# Patient Record
Sex: FEMALE | Race: WHITE | URBAN - METROPOLITAN AREA
[De-identification: names, ages, dates, MRNs, and addresses within clinical notes are randomized per-mention and may not be internally consistent; named-entity substitution may affect disease eponyms.]

---

## 2017-06-02 ENCOUNTER — EMERGENCY (EMERGENCY)
Facility: HOSPITAL | Age: 72
LOS: 0 days | Discharge: NURSING, REHAB OR LONG-TERM CARE | End: 2017-06-02
Admitting: INTERNAL MEDICINE
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
## 2017-06-02 DIAGNOSIS — F41.9 ANXIETY DISORDER, UNSPECIFIED: ICD-10-CM

## 2017-06-02 DIAGNOSIS — F25.9 SCHIZOAFFECTIVE DISORDER, UNSPECIFIED: ICD-10-CM

## 2017-06-02 DIAGNOSIS — K21.9 GASTRO-ESOPHAGEAL REFLUX DISEASE WITHOUT ESOPHAGITIS: ICD-10-CM

## 2017-06-02 DIAGNOSIS — J44.9 CHRONIC OBSTRUCTIVE PULMONARY DISEASE, UNSPECIFIED: ICD-10-CM

## 2017-06-02 DIAGNOSIS — R56.9 UNSPECIFIED CONVULSIONS: ICD-10-CM

## 2017-06-02 DIAGNOSIS — R27.0 ATAXIA, UNSPECIFIED: ICD-10-CM

## 2017-06-02 DIAGNOSIS — G43.909 MIGRAINE, UNSPECIFIED, NOT INTRACTABLE, WITHOUT STATUS MIGRAINOSUS: ICD-10-CM

## 2017-06-02 DIAGNOSIS — E78.5 HYPERLIPIDEMIA, UNSPECIFIED: ICD-10-CM

## 2017-06-23 ENCOUNTER — EMERGENCY (EMERGENCY)
Facility: HOSPITAL | Age: 72
LOS: 0 days | Discharge: HOME | End: 2017-06-23
Admitting: INTERNAL MEDICINE

## 2017-06-23 DIAGNOSIS — E78.5 HYPERLIPIDEMIA, UNSPECIFIED: ICD-10-CM

## 2017-06-23 DIAGNOSIS — J44.9 CHRONIC OBSTRUCTIVE PULMONARY DISEASE, UNSPECIFIED: ICD-10-CM

## 2017-06-23 DIAGNOSIS — Z79.899 OTHER LONG TERM (CURRENT) DRUG THERAPY: ICD-10-CM

## 2017-06-23 DIAGNOSIS — K21.9 GASTRO-ESOPHAGEAL REFLUX DISEASE WITHOUT ESOPHAGITIS: ICD-10-CM

## 2017-06-23 DIAGNOSIS — F25.9 SCHIZOAFFECTIVE DISORDER, UNSPECIFIED: ICD-10-CM

## 2017-06-23 DIAGNOSIS — R27.0 ATAXIA, UNSPECIFIED: ICD-10-CM
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
## 2017-06-23 DIAGNOSIS — Z79.82 LONG TERM (CURRENT) USE OF ASPIRIN: ICD-10-CM

## 2017-06-23 DIAGNOSIS — Z91.013 ALLERGY TO SEAFOOD: ICD-10-CM

## 2017-06-23 DIAGNOSIS — Z87.891 PERSONAL HISTORY OF NICOTINE DEPENDENCE: ICD-10-CM

## 2017-06-23 DIAGNOSIS — G43.909 MIGRAINE, UNSPECIFIED, NOT INTRACTABLE, WITHOUT STATUS MIGRAINOSUS: ICD-10-CM

## 2017-06-23 DIAGNOSIS — F41.9 ANXIETY DISORDER, UNSPECIFIED: ICD-10-CM

## 2017-06-23 DIAGNOSIS — M54.5 LOW BACK PAIN: ICD-10-CM

## 2017-06-23 DIAGNOSIS — Z88.2 ALLERGY STATUS TO SULFONAMIDES: ICD-10-CM

## 2017-06-23 DIAGNOSIS — R56.9 UNSPECIFIED CONVULSIONS: ICD-10-CM

## 2017-06-23 DIAGNOSIS — Z88.0 ALLERGY STATUS TO PENICILLIN: ICD-10-CM

## 2017-06-23 DIAGNOSIS — G89.29 OTHER CHRONIC PAIN: ICD-10-CM

## 2017-06-26 ENCOUNTER — EMERGENCY (EMERGENCY)
Facility: HOSPITAL | Age: 72
LOS: 0 days | Discharge: NURSING, REHAB OR LONG-TERM CARE | End: 2017-06-27
Admitting: INTERNAL MEDICINE

## 2017-06-26 DIAGNOSIS — E03.9 HYPOTHYROIDISM, UNSPECIFIED: ICD-10-CM
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
## 2017-06-26 DIAGNOSIS — R27.0 ATAXIA, UNSPECIFIED: ICD-10-CM

## 2017-06-26 DIAGNOSIS — J44.9 CHRONIC OBSTRUCTIVE PULMONARY DISEASE, UNSPECIFIED: ICD-10-CM

## 2017-06-26 DIAGNOSIS — K21.9 GASTRO-ESOPHAGEAL REFLUX DISEASE WITHOUT ESOPHAGITIS: ICD-10-CM

## 2017-06-26 DIAGNOSIS — G43.909 MIGRAINE, UNSPECIFIED, NOT INTRACTABLE, WITHOUT STATUS MIGRAINOSUS: ICD-10-CM

## 2017-06-26 DIAGNOSIS — M54.9 DORSALGIA, UNSPECIFIED: ICD-10-CM

## 2017-06-26 DIAGNOSIS — E86.0 DEHYDRATION: ICD-10-CM

## 2017-06-26 DIAGNOSIS — E78.5 HYPERLIPIDEMIA, UNSPECIFIED: ICD-10-CM
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
## 2017-06-26 DIAGNOSIS — F32.9 MAJOR DEPRESSIVE DISORDER, SINGLE EPISODE, UNSPECIFIED: ICD-10-CM

## 2017-06-26 DIAGNOSIS — F25.9 SCHIZOAFFECTIVE DISORDER, UNSPECIFIED: ICD-10-CM

## 2017-06-26 DIAGNOSIS — F41.9 ANXIETY DISORDER, UNSPECIFIED: ICD-10-CM

## 2017-06-26 DIAGNOSIS — Z87.891 PERSONAL HISTORY OF NICOTINE DEPENDENCE: ICD-10-CM

## 2017-06-26 DIAGNOSIS — R56.9 UNSPECIFIED CONVULSIONS: ICD-10-CM
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
## 2017-06-26 DIAGNOSIS — R07.81 PLEURODYNIA: ICD-10-CM
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
## 2017-06-28 DIAGNOSIS — Z88.0 ALLERGY STATUS TO PENICILLIN: ICD-10-CM

## 2017-06-28 DIAGNOSIS — Z87.891 PERSONAL HISTORY OF NICOTINE DEPENDENCE: ICD-10-CM

## 2017-06-28 DIAGNOSIS — J44.9 CHRONIC OBSTRUCTIVE PULMONARY DISEASE, UNSPECIFIED: ICD-10-CM

## 2017-06-28 DIAGNOSIS — Z91.013 ALLERGY TO SEAFOOD: ICD-10-CM

## 2017-06-28 DIAGNOSIS — M62.81 MUSCLE WEAKNESS (GENERALIZED): ICD-10-CM

## 2017-06-28 DIAGNOSIS — Z98.890 OTHER SPECIFIED POSTPROCEDURAL STATES: ICD-10-CM

## 2017-06-28 DIAGNOSIS — Z79.899 OTHER LONG TERM (CURRENT) DRUG THERAPY: ICD-10-CM

## 2017-06-28 DIAGNOSIS — R51 HEADACHE: ICD-10-CM

## 2017-06-28 DIAGNOSIS — Z79.82 LONG TERM (CURRENT) USE OF ASPIRIN: ICD-10-CM

## 2017-06-28 DIAGNOSIS — Z88.8 ALLERGY STATUS TO OTHER DRUGS, MEDICAMENTS AND BIOLOGICAL SUBSTANCES STATUS: ICD-10-CM

## 2017-06-28 DIAGNOSIS — K21.9 GASTRO-ESOPHAGEAL REFLUX DISEASE WITHOUT ESOPHAGITIS: ICD-10-CM

## 2017-07-16 ENCOUNTER — EMERGENCY (EMERGENCY)
Facility: HOSPITAL | Age: 72
LOS: 0 days | Discharge: NURSING, REHAB OR LONG-TERM CARE | End: 2017-07-16
Admitting: INTERNAL MEDICINE

## 2017-07-16 DIAGNOSIS — F17.200 NICOTINE DEPENDENCE, UNSPECIFIED, UNCOMPLICATED: ICD-10-CM

## 2017-07-16 DIAGNOSIS — F31.9 BIPOLAR DISORDER, UNSPECIFIED: ICD-10-CM

## 2017-07-16 DIAGNOSIS — E78.5 HYPERLIPIDEMIA, UNSPECIFIED: ICD-10-CM

## 2017-07-16 DIAGNOSIS — R56.9 UNSPECIFIED CONVULSIONS: ICD-10-CM

## 2017-07-16 DIAGNOSIS — K21.9 GASTRO-ESOPHAGEAL REFLUX DISEASE WITHOUT ESOPHAGITIS: ICD-10-CM
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
## 2017-07-16 DIAGNOSIS — G43.909 MIGRAINE, UNSPECIFIED, NOT INTRACTABLE, WITHOUT STATUS MIGRAINOSUS: ICD-10-CM

## 2017-07-16 DIAGNOSIS — S93.402A SPRAIN OF UNSPECIFIED LIGAMENT OF LEFT ANKLE, INITIAL ENCOUNTER: ICD-10-CM
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
## 2017-07-16 DIAGNOSIS — F25.9 SCHIZOAFFECTIVE DISORDER, UNSPECIFIED: ICD-10-CM

## 2017-07-16 DIAGNOSIS — J44.9 CHRONIC OBSTRUCTIVE PULMONARY DISEASE, UNSPECIFIED: ICD-10-CM
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
## 2017-07-16 DIAGNOSIS — F41.9 ANXIETY DISORDER, UNSPECIFIED: ICD-10-CM

## 2017-07-16 DIAGNOSIS — R27.0 ATAXIA, UNSPECIFIED: ICD-10-CM

## 2017-07-16 DIAGNOSIS — E03.9 HYPOTHYROIDISM, UNSPECIFIED: ICD-10-CM

## 2017-07-16 DIAGNOSIS — M25.572 PAIN IN LEFT ANKLE AND JOINTS OF LEFT FOOT: ICD-10-CM

## 2017-07-27 ENCOUNTER — EMERGENCY (EMERGENCY)
Facility: HOSPITAL | Age: 72
LOS: 0 days | Discharge: HOME | End: 2017-07-27
Admitting: INTERNAL MEDICINE

## 2017-07-27 DIAGNOSIS — R56.9 UNSPECIFIED CONVULSIONS: ICD-10-CM

## 2017-07-27 DIAGNOSIS — S50.02XA CONTUSION OF LEFT ELBOW, INITIAL ENCOUNTER: ICD-10-CM
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
## 2017-07-27 DIAGNOSIS — E78.5 HYPERLIPIDEMIA, UNSPECIFIED: ICD-10-CM

## 2017-07-27 DIAGNOSIS — Z79.82 LONG TERM (CURRENT) USE OF ASPIRIN: ICD-10-CM

## 2017-07-27 DIAGNOSIS — J44.9 CHRONIC OBSTRUCTIVE PULMONARY DISEASE, UNSPECIFIED: ICD-10-CM

## 2017-07-27 DIAGNOSIS — K21.9 GASTRO-ESOPHAGEAL REFLUX DISEASE WITHOUT ESOPHAGITIS: ICD-10-CM

## 2017-07-27 DIAGNOSIS — M25.522 PAIN IN LEFT ELBOW: ICD-10-CM

## 2017-07-27 DIAGNOSIS — R27.0 ATAXIA, UNSPECIFIED: ICD-10-CM

## 2017-07-27 DIAGNOSIS — Z87.891 PERSONAL HISTORY OF NICOTINE DEPENDENCE: ICD-10-CM
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
## 2017-07-27 DIAGNOSIS — G43.909 MIGRAINE, UNSPECIFIED, NOT INTRACTABLE, WITHOUT STATUS MIGRAINOSUS: ICD-10-CM

## 2017-07-27 DIAGNOSIS — F25.9 SCHIZOAFFECTIVE DISORDER, UNSPECIFIED: ICD-10-CM

## 2017-07-27 DIAGNOSIS — E03.9 HYPOTHYROIDISM, UNSPECIFIED: ICD-10-CM

## 2017-07-27 DIAGNOSIS — F41.9 ANXIETY DISORDER, UNSPECIFIED: ICD-10-CM
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
## 2017-07-27 DIAGNOSIS — S09.90XA UNSPECIFIED INJURY OF HEAD, INITIAL ENCOUNTER: ICD-10-CM

## 2017-07-27 DIAGNOSIS — Z88.0 ALLERGY STATUS TO PENICILLIN: ICD-10-CM

## 2017-08-04 ENCOUNTER — EMERGENCY (EMERGENCY)
Facility: HOSPITAL | Age: 72
LOS: 0 days | Discharge: NURSING, REHAB OR LONG-TERM CARE | End: 2017-08-05
Admitting: INTERNAL MEDICINE

## 2017-08-04 DIAGNOSIS — E78.5 HYPERLIPIDEMIA, UNSPECIFIED: ICD-10-CM

## 2017-08-04 DIAGNOSIS — K21.9 GASTRO-ESOPHAGEAL REFLUX DISEASE WITHOUT ESOPHAGITIS: ICD-10-CM

## 2017-08-04 DIAGNOSIS — F31.9 BIPOLAR DISORDER, UNSPECIFIED: ICD-10-CM
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
## 2017-08-04 DIAGNOSIS — F41.9 ANXIETY DISORDER, UNSPECIFIED: ICD-10-CM

## 2017-08-04 DIAGNOSIS — G43.909 MIGRAINE, UNSPECIFIED, NOT INTRACTABLE, WITHOUT STATUS MIGRAINOSUS: ICD-10-CM

## 2017-08-04 DIAGNOSIS — R56.9 UNSPECIFIED CONVULSIONS: ICD-10-CM

## 2017-08-04 DIAGNOSIS — E03.9 HYPOTHYROIDISM, UNSPECIFIED: ICD-10-CM

## 2017-08-04 DIAGNOSIS — J44.9 CHRONIC OBSTRUCTIVE PULMONARY DISEASE, UNSPECIFIED: ICD-10-CM

## 2017-08-04 DIAGNOSIS — F25.9 SCHIZOAFFECTIVE DISORDER, UNSPECIFIED: ICD-10-CM
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
## 2017-08-04 DIAGNOSIS — M25.521 PAIN IN RIGHT ELBOW: ICD-10-CM

## 2017-08-04 DIAGNOSIS — R27.0 ATAXIA, UNSPECIFIED: ICD-10-CM
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
## 2017-08-04 DIAGNOSIS — R41.0 DISORIENTATION, UNSPECIFIED: ICD-10-CM

## 2017-08-15 ENCOUNTER — EMERGENCY (EMERGENCY)
Facility: HOSPITAL | Age: 72
LOS: 1 days | Discharge: NURSING, REHAB OR LONG-TERM CARE | End: 2017-08-15
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
## 2017-08-15 DIAGNOSIS — K21.9 GASTRO-ESOPHAGEAL REFLUX DISEASE WITHOUT ESOPHAGITIS: ICD-10-CM

## 2017-08-15 DIAGNOSIS — J44.9 CHRONIC OBSTRUCTIVE PULMONARY DISEASE, UNSPECIFIED: ICD-10-CM

## 2017-08-15 DIAGNOSIS — M25.50 PAIN IN UNSPECIFIED JOINT: ICD-10-CM

## 2017-08-15 DIAGNOSIS — Z88.0 ALLERGY STATUS TO PENICILLIN: ICD-10-CM

## 2017-08-15 DIAGNOSIS — F25.9 SCHIZOAFFECTIVE DISORDER, UNSPECIFIED: ICD-10-CM

## 2017-08-15 DIAGNOSIS — Z79.82 LONG TERM (CURRENT) USE OF ASPIRIN: ICD-10-CM

## 2017-08-15 DIAGNOSIS — Z79.899 OTHER LONG TERM (CURRENT) DRUG THERAPY: ICD-10-CM

## 2017-08-15 DIAGNOSIS — G43.909 MIGRAINE, UNSPECIFIED, NOT INTRACTABLE, WITHOUT STATUS MIGRAINOSUS: ICD-10-CM

## 2017-08-15 DIAGNOSIS — E78.5 HYPERLIPIDEMIA, UNSPECIFIED: ICD-10-CM

## 2017-08-15 DIAGNOSIS — F41.9 ANXIETY DISORDER, UNSPECIFIED: ICD-10-CM

## 2017-08-15 DIAGNOSIS — I63.9 CEREBRAL INFARCTION, UNSPECIFIED: ICD-10-CM

## 2017-08-15 DIAGNOSIS — M79.1 MYALGIA: ICD-10-CM

## 2017-08-15 DIAGNOSIS — G89.29 OTHER CHRONIC PAIN: ICD-10-CM

## 2017-08-15 DIAGNOSIS — E03.9 HYPOTHYROIDISM, UNSPECIFIED: ICD-10-CM

## 2017-08-15 DIAGNOSIS — R56.9 UNSPECIFIED CONVULSIONS: ICD-10-CM

## 2017-08-15 DIAGNOSIS — Z91.013 ALLERGY TO SEAFOOD: ICD-10-CM

## 2017-08-15 DIAGNOSIS — Z86.73 PERSONAL HISTORY OF TRANSIENT ISCHEMIC ATTACK (TIA), AND CEREBRAL INFARCTION WITHOUT RESIDUAL DEFICITS: ICD-10-CM

## 2017-08-15 DIAGNOSIS — Z88.8 ALLERGY STATUS TO OTHER DRUGS, MEDICAMENTS AND BIOLOGICAL SUBSTANCES STATUS: ICD-10-CM

## 2017-08-15 DIAGNOSIS — R27.0 ATAXIA, UNSPECIFIED: ICD-10-CM

## 2017-08-19 ENCOUNTER — EMERGENCY (EMERGENCY)
Facility: HOSPITAL | Age: 72
LOS: 0 days | Discharge: HOME | End: 2017-08-20
Admitting: INTERNAL MEDICINE

## 2017-08-19 DIAGNOSIS — R56.9 UNSPECIFIED CONVULSIONS: ICD-10-CM

## 2017-08-19 DIAGNOSIS — Z98.890 OTHER SPECIFIED POSTPROCEDURAL STATES: ICD-10-CM

## 2017-08-19 DIAGNOSIS — Z79.82 LONG TERM (CURRENT) USE OF ASPIRIN: ICD-10-CM

## 2017-08-19 DIAGNOSIS — Z91.013 ALLERGY TO SEAFOOD: ICD-10-CM

## 2017-08-19 DIAGNOSIS — R42 DIZZINESS AND GIDDINESS: ICD-10-CM

## 2017-08-19 DIAGNOSIS — E78.5 HYPERLIPIDEMIA, UNSPECIFIED: ICD-10-CM

## 2017-08-19 DIAGNOSIS — K21.9 GASTRO-ESOPHAGEAL REFLUX DISEASE WITHOUT ESOPHAGITIS: ICD-10-CM

## 2017-08-19 DIAGNOSIS — J44.9 CHRONIC OBSTRUCTIVE PULMONARY DISEASE, UNSPECIFIED: ICD-10-CM

## 2017-08-19 DIAGNOSIS — F41.9 ANXIETY DISORDER, UNSPECIFIED: ICD-10-CM

## 2017-08-19 DIAGNOSIS — Z79.899 OTHER LONG TERM (CURRENT) DRUG THERAPY: ICD-10-CM
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
## 2017-08-19 DIAGNOSIS — R27.0 ATAXIA, UNSPECIFIED: ICD-10-CM

## 2017-08-19 DIAGNOSIS — Z88.8 ALLERGY STATUS TO OTHER DRUGS, MEDICAMENTS AND BIOLOGICAL SUBSTANCES STATUS: ICD-10-CM

## 2017-08-19 DIAGNOSIS — E03.9 HYPOTHYROIDISM, UNSPECIFIED: ICD-10-CM

## 2017-08-19 DIAGNOSIS — F25.9 SCHIZOAFFECTIVE DISORDER, UNSPECIFIED: ICD-10-CM

## 2017-08-19 DIAGNOSIS — G43.909 MIGRAINE, UNSPECIFIED, NOT INTRACTABLE, WITHOUT STATUS MIGRAINOSUS: ICD-10-CM

## 2017-08-19 DIAGNOSIS — Z87.891 PERSONAL HISTORY OF NICOTINE DEPENDENCE: ICD-10-CM

## 2017-08-19 DIAGNOSIS — Z88.0 ALLERGY STATUS TO PENICILLIN: ICD-10-CM

## 2017-08-23 ENCOUNTER — EMERGENCY (EMERGENCY)
Facility: HOSPITAL | Age: 72
LOS: 0 days | Discharge: NURSING, REHAB OR LONG-TERM CARE | End: 2017-08-23
Admitting: INTERNAL MEDICINE

## 2017-08-23 DIAGNOSIS — Z88.0 ALLERGY STATUS TO PENICILLIN: ICD-10-CM

## 2017-08-23 DIAGNOSIS — E78.5 HYPERLIPIDEMIA, UNSPECIFIED: ICD-10-CM

## 2017-08-23 DIAGNOSIS — F25.9 SCHIZOAFFECTIVE DISORDER, UNSPECIFIED: ICD-10-CM

## 2017-08-23 DIAGNOSIS — F17.200 NICOTINE DEPENDENCE, UNSPECIFIED, UNCOMPLICATED: ICD-10-CM

## 2017-08-23 DIAGNOSIS — F32.9 MAJOR DEPRESSIVE DISORDER, SINGLE EPISODE, UNSPECIFIED: ICD-10-CM

## 2017-08-23 DIAGNOSIS — R27.0 ATAXIA, UNSPECIFIED: ICD-10-CM

## 2017-08-23 DIAGNOSIS — F41.9 ANXIETY DISORDER, UNSPECIFIED: ICD-10-CM

## 2017-08-23 DIAGNOSIS — Z79.899 OTHER LONG TERM (CURRENT) DRUG THERAPY: ICD-10-CM

## 2017-08-23 DIAGNOSIS — J44.9 CHRONIC OBSTRUCTIVE PULMONARY DISEASE, UNSPECIFIED: ICD-10-CM

## 2017-08-23 DIAGNOSIS — F34.1 DYSTHYMIC DISORDER: ICD-10-CM

## 2017-08-23 DIAGNOSIS — Z91.013 ALLERGY TO SEAFOOD: ICD-10-CM

## 2017-08-23 DIAGNOSIS — G43.909 MIGRAINE, UNSPECIFIED, NOT INTRACTABLE, WITHOUT STATUS MIGRAINOSUS: ICD-10-CM

## 2017-08-23 DIAGNOSIS — K21.9 GASTRO-ESOPHAGEAL REFLUX DISEASE WITHOUT ESOPHAGITIS: ICD-10-CM

## 2017-08-23 DIAGNOSIS — Z88.8 ALLERGY STATUS TO OTHER DRUGS, MEDICAMENTS AND BIOLOGICAL SUBSTANCES: ICD-10-CM
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
## 2017-08-23 DIAGNOSIS — E03.9 HYPOTHYROIDISM, UNSPECIFIED: ICD-10-CM

## 2017-08-23 DIAGNOSIS — R56.9 UNSPECIFIED CONVULSIONS: ICD-10-CM

## 2017-08-23 DIAGNOSIS — Z79.82 LONG TERM (CURRENT) USE OF ASPIRIN: ICD-10-CM

## 2017-08-26 ENCOUNTER — EMERGENCY (EMERGENCY)
Facility: HOSPITAL | Age: 72
LOS: 0 days | Discharge: NURSING, REHAB OR LONG-TERM CARE | End: 2017-08-27
Admitting: INTERNAL MEDICINE

## 2017-08-26 DIAGNOSIS — F17.200 NICOTINE DEPENDENCE, UNSPECIFIED, UNCOMPLICATED: ICD-10-CM

## 2017-08-26 DIAGNOSIS — K21.9 GASTRO-ESOPHAGEAL REFLUX DISEASE WITHOUT ESOPHAGITIS: ICD-10-CM

## 2017-08-26 DIAGNOSIS — R27.0 ATAXIA, UNSPECIFIED: ICD-10-CM

## 2017-08-26 DIAGNOSIS — J44.9 CHRONIC OBSTRUCTIVE PULMONARY DISEASE, UNSPECIFIED: ICD-10-CM

## 2017-08-26 DIAGNOSIS — F31.9 BIPOLAR DISORDER, UNSPECIFIED: ICD-10-CM

## 2017-08-26 DIAGNOSIS — E03.9 HYPOTHYROIDISM, UNSPECIFIED: ICD-10-CM

## 2017-08-26 DIAGNOSIS — M79.605 PAIN IN LEFT LEG: ICD-10-CM

## 2017-08-26 DIAGNOSIS — Z79.899 OTHER LONG TERM (CURRENT) DRUG THERAPY: ICD-10-CM

## 2017-08-26 DIAGNOSIS — M79.604 PAIN IN RIGHT LEG: ICD-10-CM

## 2017-08-26 DIAGNOSIS — M79.602 PAIN IN LEFT ARM: ICD-10-CM

## 2017-08-26 DIAGNOSIS — F41.9 ANXIETY DISORDER, UNSPECIFIED: ICD-10-CM

## 2017-08-26 DIAGNOSIS — F25.9 SCHIZOAFFECTIVE DISORDER, UNSPECIFIED: ICD-10-CM

## 2017-08-26 DIAGNOSIS — E78.5 HYPERLIPIDEMIA, UNSPECIFIED: ICD-10-CM

## 2017-08-26 DIAGNOSIS — G89.29 OTHER CHRONIC PAIN: ICD-10-CM
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
## 2017-08-26 DIAGNOSIS — M79.601 PAIN IN RIGHT ARM: ICD-10-CM

## 2017-08-26 DIAGNOSIS — R56.9 UNSPECIFIED CONVULSIONS: ICD-10-CM

## 2017-08-26 DIAGNOSIS — G43.909 MIGRAINE, UNSPECIFIED, NOT INTRACTABLE, WITHOUT STATUS MIGRAINOSUS: ICD-10-CM

## 2017-09-04 ENCOUNTER — EMERGENCY (EMERGENCY)
Facility: HOSPITAL | Age: 72
LOS: 0 days | Discharge: NURSING, REHAB OR LONG-TERM CARE | End: 2017-09-04
Admitting: INTERNAL MEDICINE
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
## 2017-09-04 DIAGNOSIS — K21.9 GASTRO-ESOPHAGEAL REFLUX DISEASE WITHOUT ESOPHAGITIS: ICD-10-CM

## 2017-09-04 DIAGNOSIS — M79.605 PAIN IN LEFT LEG: ICD-10-CM

## 2017-09-04 DIAGNOSIS — G89.29 OTHER CHRONIC PAIN: ICD-10-CM

## 2017-09-04 DIAGNOSIS — Z88.0 ALLERGY STATUS TO PENICILLIN: ICD-10-CM

## 2017-09-04 DIAGNOSIS — E78.5 HYPERLIPIDEMIA, UNSPECIFIED: ICD-10-CM

## 2017-09-04 DIAGNOSIS — R56.9 UNSPECIFIED CONVULSIONS: ICD-10-CM

## 2017-09-04 DIAGNOSIS — M25.551 PAIN IN RIGHT HIP: ICD-10-CM

## 2017-09-04 DIAGNOSIS — Z91.013 ALLERGY TO SEAFOOD: ICD-10-CM

## 2017-09-04 DIAGNOSIS — Z79.82 LONG TERM (CURRENT) USE OF ASPIRIN: ICD-10-CM

## 2017-09-04 DIAGNOSIS — G43.909 MIGRAINE, UNSPECIFIED, NOT INTRACTABLE, WITHOUT STATUS MIGRAINOSUS: ICD-10-CM

## 2017-09-04 DIAGNOSIS — F41.9 ANXIETY DISORDER, UNSPECIFIED: ICD-10-CM

## 2017-09-04 DIAGNOSIS — J44.9 CHRONIC OBSTRUCTIVE PULMONARY DISEASE, UNSPECIFIED: ICD-10-CM

## 2017-09-04 DIAGNOSIS — F25.9 SCHIZOAFFECTIVE DISORDER, UNSPECIFIED: ICD-10-CM

## 2017-09-04 DIAGNOSIS — R27.0 ATAXIA, UNSPECIFIED: ICD-10-CM

## 2017-09-04 DIAGNOSIS — E03.9 HYPOTHYROIDISM, UNSPECIFIED: ICD-10-CM

## 2017-09-04 DIAGNOSIS — Z88.8 ALLERGY STATUS TO OTHER DRUGS, MEDICAMENTS AND BIOLOGICAL SUBSTANCES: ICD-10-CM

## 2017-09-04 DIAGNOSIS — M79.602 PAIN IN LEFT ARM: ICD-10-CM

## 2017-09-17 ENCOUNTER — EMERGENCY (EMERGENCY)
Facility: HOSPITAL | Age: 72
LOS: 0 days | Discharge: HOME | End: 2017-09-17
Admitting: INTERNAL MEDICINE

## 2017-09-17 DIAGNOSIS — E03.9 HYPOTHYROIDISM, UNSPECIFIED: ICD-10-CM

## 2017-09-17 DIAGNOSIS — K21.9 GASTRO-ESOPHAGEAL REFLUX DISEASE WITHOUT ESOPHAGITIS: ICD-10-CM

## 2017-09-17 DIAGNOSIS — Z88.0 ALLERGY STATUS TO PENICILLIN: ICD-10-CM

## 2017-09-17 DIAGNOSIS — J44.9 CHRONIC OBSTRUCTIVE PULMONARY DISEASE, UNSPECIFIED: ICD-10-CM

## 2017-09-17 DIAGNOSIS — R56.9 UNSPECIFIED CONVULSIONS: ICD-10-CM

## 2017-09-17 DIAGNOSIS — R27.0 ATAXIA, UNSPECIFIED: ICD-10-CM

## 2017-09-17 DIAGNOSIS — F31.9 BIPOLAR DISORDER, UNSPECIFIED: ICD-10-CM

## 2017-09-17 DIAGNOSIS — M79.89 OTHER SPECIFIED SOFT TISSUE DISORDERS: ICD-10-CM

## 2017-09-17 DIAGNOSIS — Z79.82 LONG TERM (CURRENT) USE OF ASPIRIN: ICD-10-CM

## 2017-09-17 DIAGNOSIS — Z98.890 OTHER SPECIFIED POSTPROCEDURAL STATES: ICD-10-CM

## 2017-09-17 DIAGNOSIS — F17.200 NICOTINE DEPENDENCE, UNSPECIFIED, UNCOMPLICATED: ICD-10-CM

## 2017-09-17 DIAGNOSIS — F25.9 SCHIZOAFFECTIVE DISORDER, UNSPECIFIED: ICD-10-CM

## 2017-09-17 DIAGNOSIS — F41.9 ANXIETY DISORDER, UNSPECIFIED: ICD-10-CM
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
## 2017-09-17 DIAGNOSIS — M79.601 PAIN IN RIGHT ARM: ICD-10-CM

## 2017-09-17 DIAGNOSIS — Z91.013 ALLERGY TO SEAFOOD: ICD-10-CM

## 2017-09-17 DIAGNOSIS — E78.5 HYPERLIPIDEMIA, UNSPECIFIED: ICD-10-CM

## 2017-09-17 DIAGNOSIS — I10 ESSENTIAL (PRIMARY) HYPERTENSION: ICD-10-CM

## 2017-09-17 DIAGNOSIS — G43.909 MIGRAINE, UNSPECIFIED, NOT INTRACTABLE, WITHOUT STATUS MIGRAINOSUS: ICD-10-CM

## 2017-09-19 PROBLEM — Z00.00 ENCOUNTER FOR PREVENTIVE HEALTH EXAMINATION: Status: ACTIVE | Noted: 2017-09-19

## 2017-09-27 ENCOUNTER — EMERGENCY (EMERGENCY)
Facility: HOSPITAL | Age: 72
LOS: 0 days | Discharge: HOME | End: 2017-09-27

## 2017-09-27 DIAGNOSIS — R56.9 UNSPECIFIED CONVULSIONS: ICD-10-CM

## 2017-09-27 DIAGNOSIS — Z88.8 ALLERGY STATUS TO OTHER DRUGS, MEDICAMENTS AND BIOLOGICAL SUBSTANCES STATUS: ICD-10-CM

## 2017-09-27 DIAGNOSIS — G43.909 MIGRAINE, UNSPECIFIED, NOT INTRACTABLE, WITHOUT STATUS MIGRAINOSUS: ICD-10-CM

## 2017-09-27 DIAGNOSIS — E78.5 HYPERLIPIDEMIA, UNSPECIFIED: ICD-10-CM

## 2017-09-27 DIAGNOSIS — Z79.82 LONG TERM (CURRENT) USE OF ASPIRIN: ICD-10-CM
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
## 2017-09-27 DIAGNOSIS — Z79.899 OTHER LONG TERM (CURRENT) DRUG THERAPY: ICD-10-CM

## 2017-09-27 DIAGNOSIS — K21.9 GASTRO-ESOPHAGEAL REFLUX DISEASE WITHOUT ESOPHAGITIS: ICD-10-CM

## 2017-09-27 DIAGNOSIS — R27.0 ATAXIA, UNSPECIFIED: ICD-10-CM

## 2017-09-27 DIAGNOSIS — E03.9 HYPOTHYROIDISM, UNSPECIFIED: ICD-10-CM

## 2017-09-27 DIAGNOSIS — F25.9 SCHIZOAFFECTIVE DISORDER, UNSPECIFIED: ICD-10-CM

## 2017-09-27 DIAGNOSIS — F41.9 ANXIETY DISORDER, UNSPECIFIED: ICD-10-CM

## 2017-09-27 DIAGNOSIS — Z91.013 ALLERGY TO SEAFOOD: ICD-10-CM

## 2017-09-27 DIAGNOSIS — F17.200 NICOTINE DEPENDENCE, UNSPECIFIED, UNCOMPLICATED: ICD-10-CM

## 2017-09-27 DIAGNOSIS — J44.9 CHRONIC OBSTRUCTIVE PULMONARY DISEASE, UNSPECIFIED: ICD-10-CM

## 2017-09-27 DIAGNOSIS — Z88.0 ALLERGY STATUS TO PENICILLIN: ICD-10-CM

## 2017-10-16 ENCOUNTER — EMERGENCY (EMERGENCY)
Facility: HOSPITAL | Age: 72
LOS: 0 days | Discharge: HOME | End: 2017-10-17
Admitting: INTERNAL MEDICINE

## 2017-10-16 DIAGNOSIS — F25.9 SCHIZOAFFECTIVE DISORDER, UNSPECIFIED: ICD-10-CM

## 2017-10-16 DIAGNOSIS — R27.0 ATAXIA, UNSPECIFIED: ICD-10-CM

## 2017-10-16 DIAGNOSIS — Z79.899 OTHER LONG TERM (CURRENT) DRUG THERAPY: ICD-10-CM

## 2017-10-16 DIAGNOSIS — R56.9 UNSPECIFIED CONVULSIONS: ICD-10-CM

## 2017-10-16 DIAGNOSIS — E78.5 HYPERLIPIDEMIA, UNSPECIFIED: ICD-10-CM

## 2017-10-16 DIAGNOSIS — J44.9 CHRONIC OBSTRUCTIVE PULMONARY DISEASE, UNSPECIFIED: ICD-10-CM

## 2017-10-16 DIAGNOSIS — F41.9 ANXIETY DISORDER, UNSPECIFIED: ICD-10-CM

## 2017-10-16 DIAGNOSIS — R23.8 OTHER SKIN CHANGES: ICD-10-CM

## 2017-10-16 DIAGNOSIS — Z88.0 ALLERGY STATUS TO PENICILLIN: ICD-10-CM

## 2017-10-16 DIAGNOSIS — Z91.013 ALLERGY TO SEAFOOD: ICD-10-CM

## 2017-10-16 DIAGNOSIS — Z88.8 ALLERGY STATUS TO OTHER DRUGS, MEDICAMENTS AND BIOLOGICAL SUBSTANCES: ICD-10-CM
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
## 2017-10-16 DIAGNOSIS — R42 DIZZINESS AND GIDDINESS: ICD-10-CM

## 2017-10-16 DIAGNOSIS — G43.909 MIGRAINE, UNSPECIFIED, NOT INTRACTABLE, WITHOUT STATUS MIGRAINOSUS: ICD-10-CM

## 2017-10-16 DIAGNOSIS — K21.9 GASTRO-ESOPHAGEAL REFLUX DISEASE WITHOUT ESOPHAGITIS: ICD-10-CM

## 2017-10-16 DIAGNOSIS — Z79.82 LONG TERM (CURRENT) USE OF ASPIRIN: ICD-10-CM

## 2017-10-16 DIAGNOSIS — Z98.890 OTHER SPECIFIED POSTPROCEDURAL STATES: ICD-10-CM

## 2017-11-01 ENCOUNTER — EMERGENCY (EMERGENCY)
Facility: HOSPITAL | Age: 72
LOS: 0 days | Discharge: NURSING, REHAB OR LONG-TERM CARE | End: 2017-11-01
Admitting: INTERNAL MEDICINE

## 2017-11-01 DIAGNOSIS — E78.5 HYPERLIPIDEMIA, UNSPECIFIED: ICD-10-CM

## 2017-11-01 DIAGNOSIS — R07.89 OTHER CHEST PAIN: ICD-10-CM

## 2017-11-01 DIAGNOSIS — F25.9 SCHIZOAFFECTIVE DISORDER, UNSPECIFIED: ICD-10-CM

## 2017-11-01 DIAGNOSIS — J44.9 CHRONIC OBSTRUCTIVE PULMONARY DISEASE, UNSPECIFIED: ICD-10-CM

## 2017-11-01 DIAGNOSIS — F41.9 ANXIETY DISORDER, UNSPECIFIED: ICD-10-CM

## 2017-11-01 DIAGNOSIS — E03.9 HYPOTHYROIDISM, UNSPECIFIED: ICD-10-CM

## 2017-11-01 DIAGNOSIS — K21.9 GASTRO-ESOPHAGEAL REFLUX DISEASE WITHOUT ESOPHAGITIS: ICD-10-CM

## 2017-11-01 DIAGNOSIS — R27.0 ATAXIA, UNSPECIFIED: ICD-10-CM

## 2017-11-01 DIAGNOSIS — F44.5 CONVERSION DISORDER WITH SEIZURES OR CONVULSIONS: ICD-10-CM
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
## 2017-11-01 DIAGNOSIS — G43.909 MIGRAINE, UNSPECIFIED, NOT INTRACTABLE, WITHOUT STATUS MIGRAINOSUS: ICD-10-CM

## 2017-11-01 DIAGNOSIS — R56.9 UNSPECIFIED CONVULSIONS: ICD-10-CM

## 2017-11-15 ENCOUNTER — EMERGENCY (EMERGENCY)
Facility: HOSPITAL | Age: 72
LOS: 0 days | Discharge: NURSING, REHAB OR LONG-TERM CARE | End: 2017-11-15

## 2017-11-15 DIAGNOSIS — E78.5 HYPERLIPIDEMIA, UNSPECIFIED: ICD-10-CM

## 2017-11-15 DIAGNOSIS — R56.9 UNSPECIFIED CONVULSIONS: ICD-10-CM

## 2017-11-15 DIAGNOSIS — R29.818 OTHER SYMPTOMS AND SIGNS INVOLVING THE NERVOUS SYSTEM: ICD-10-CM

## 2017-11-15 DIAGNOSIS — Z88.8 ALLERGY STATUS TO OTHER DRUGS, MEDICAMENTS AND BIOLOGICAL SUBSTANCES STATUS: ICD-10-CM

## 2017-11-15 DIAGNOSIS — E03.9 HYPOTHYROIDISM, UNSPECIFIED: ICD-10-CM

## 2017-11-15 DIAGNOSIS — K21.9 GASTRO-ESOPHAGEAL REFLUX DISEASE WITHOUT ESOPHAGITIS: ICD-10-CM

## 2017-11-15 DIAGNOSIS — G43.909 MIGRAINE, UNSPECIFIED, NOT INTRACTABLE, WITHOUT STATUS MIGRAINOSUS: ICD-10-CM

## 2017-11-15 DIAGNOSIS — Z98.890 OTHER SPECIFIED POSTPROCEDURAL STATES: ICD-10-CM

## 2017-11-15 DIAGNOSIS — J44.9 CHRONIC OBSTRUCTIVE PULMONARY DISEASE, UNSPECIFIED: ICD-10-CM

## 2017-11-15 DIAGNOSIS — Z91.013 ALLERGY TO SEAFOOD: ICD-10-CM

## 2017-11-15 DIAGNOSIS — Z79.82 LONG TERM (CURRENT) USE OF ASPIRIN: ICD-10-CM

## 2017-11-15 DIAGNOSIS — F25.9 SCHIZOAFFECTIVE DISORDER, UNSPECIFIED: ICD-10-CM

## 2017-11-15 DIAGNOSIS — F41.9 ANXIETY DISORDER, UNSPECIFIED: ICD-10-CM

## 2017-11-15 DIAGNOSIS — F17.210 NICOTINE DEPENDENCE, CIGARETTES, UNCOMPLICATED: ICD-10-CM

## 2017-11-15 DIAGNOSIS — R27.0 ATAXIA, UNSPECIFIED: ICD-10-CM

## 2017-11-15 DIAGNOSIS — Z79.899 OTHER LONG TERM (CURRENT) DRUG THERAPY: ICD-10-CM

## 2017-11-15 DIAGNOSIS — Z88.0 ALLERGY STATUS TO PENICILLIN: ICD-10-CM
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
## 2017-12-01 ENCOUNTER — INPATIENT (INPATIENT)
Facility: HOSPITAL | Age: 72
LOS: 2 days | Discharge: NURSING, REHAB OR LONG-TERM CARE | End: 2017-12-04
Attending: INTERNAL MEDICINE | Admitting: INTERNAL MEDICINE
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
## 2017-12-01 DIAGNOSIS — K21.9 GASTRO-ESOPHAGEAL REFLUX DISEASE WITHOUT ESOPHAGITIS: ICD-10-CM

## 2017-12-01 DIAGNOSIS — J44.9 CHRONIC OBSTRUCTIVE PULMONARY DISEASE, UNSPECIFIED: ICD-10-CM

## 2017-12-01 DIAGNOSIS — E78.5 HYPERLIPIDEMIA, UNSPECIFIED: ICD-10-CM

## 2017-12-01 DIAGNOSIS — R27.0 ATAXIA, UNSPECIFIED: ICD-10-CM

## 2017-12-01 DIAGNOSIS — G43.909 MIGRAINE, UNSPECIFIED, NOT INTRACTABLE, WITHOUT STATUS MIGRAINOSUS: ICD-10-CM

## 2017-12-01 DIAGNOSIS — F41.9 ANXIETY DISORDER, UNSPECIFIED: ICD-10-CM

## 2017-12-01 DIAGNOSIS — R56.9 UNSPECIFIED CONVULSIONS: ICD-10-CM

## 2017-12-01 DIAGNOSIS — F25.9 SCHIZOAFFECTIVE DISORDER, UNSPECIFIED: ICD-10-CM

## 2017-12-07 DIAGNOSIS — G40.89 OTHER SEIZURES: ICD-10-CM

## 2017-12-07 DIAGNOSIS — K21.9 GASTRO-ESOPHAGEAL REFLUX DISEASE WITHOUT ESOPHAGITIS: ICD-10-CM

## 2017-12-07 DIAGNOSIS — E78.5 HYPERLIPIDEMIA, UNSPECIFIED: ICD-10-CM

## 2017-12-07 DIAGNOSIS — F31.9 BIPOLAR DISORDER, UNSPECIFIED: ICD-10-CM

## 2017-12-07 DIAGNOSIS — D64.9 ANEMIA, UNSPECIFIED: ICD-10-CM

## 2017-12-07 DIAGNOSIS — J44.9 CHRONIC OBSTRUCTIVE PULMONARY DISEASE, UNSPECIFIED: ICD-10-CM

## 2017-12-07 DIAGNOSIS — G43.909 MIGRAINE, UNSPECIFIED, NOT INTRACTABLE, WITHOUT STATUS MIGRAINOSUS: ICD-10-CM

## 2017-12-07 DIAGNOSIS — F41.9 ANXIETY DISORDER, UNSPECIFIED: ICD-10-CM

## 2017-12-07 DIAGNOSIS — E03.9 HYPOTHYROIDISM, UNSPECIFIED: ICD-10-CM

## 2017-12-07 DIAGNOSIS — Z86.73 PERSONAL HISTORY OF TRANSIENT ISCHEMIC ATTACK (TIA), AND CEREBRAL INFARCTION WITHOUT RESIDUAL DEFICITS: ICD-10-CM

## 2017-12-08 ENCOUNTER — EMERGENCY (EMERGENCY)
Facility: HOSPITAL | Age: 72
LOS: 0 days | Discharge: HOME | End: 2017-12-08
Admitting: INTERNAL MEDICINE

## 2017-12-08 DIAGNOSIS — R27.0 ATAXIA, UNSPECIFIED: ICD-10-CM

## 2017-12-08 DIAGNOSIS — E03.9 HYPOTHYROIDISM, UNSPECIFIED: ICD-10-CM

## 2017-12-08 DIAGNOSIS — K21.9 GASTRO-ESOPHAGEAL REFLUX DISEASE WITHOUT ESOPHAGITIS: ICD-10-CM

## 2017-12-08 DIAGNOSIS — E78.5 HYPERLIPIDEMIA, UNSPECIFIED: ICD-10-CM
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
## 2017-12-08 DIAGNOSIS — F20.9 SCHIZOPHRENIA, UNSPECIFIED: ICD-10-CM

## 2017-12-08 DIAGNOSIS — M79.604 PAIN IN RIGHT LEG: ICD-10-CM

## 2017-12-08 DIAGNOSIS — J44.9 CHRONIC OBSTRUCTIVE PULMONARY DISEASE, UNSPECIFIED: ICD-10-CM
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
## 2017-12-08 DIAGNOSIS — F41.9 ANXIETY DISORDER, UNSPECIFIED: ICD-10-CM
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
## 2017-12-08 DIAGNOSIS — M79.601 PAIN IN RIGHT ARM: ICD-10-CM

## 2017-12-08 DIAGNOSIS — R56.9 UNSPECIFIED CONVULSIONS: ICD-10-CM

## 2017-12-08 DIAGNOSIS — E78.4 OTHER HYPERLIPIDEMIA: ICD-10-CM

## 2017-12-08 DIAGNOSIS — G43.909 MIGRAINE, UNSPECIFIED, NOT INTRACTABLE, WITHOUT STATUS MIGRAINOSUS: ICD-10-CM
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
## 2017-12-08 DIAGNOSIS — F25.9 SCHIZOAFFECTIVE DISORDER, UNSPECIFIED: ICD-10-CM

## 2017-12-11 ENCOUNTER — EMERGENCY (EMERGENCY)
Facility: HOSPITAL | Age: 72
LOS: 1 days | End: 2017-12-11
Admitting: INTERNAL MEDICINE

## 2017-12-11 DIAGNOSIS — K08.89 OTHER SPECIFIED DISORDERS OF TEETH AND SUPPORTING STRUCTURES: ICD-10-CM

## 2017-12-14 DIAGNOSIS — G40.89 OTHER SEIZURES: ICD-10-CM

## 2018-01-02 ENCOUNTER — EMERGENCY (EMERGENCY)
Facility: HOSPITAL | Age: 73
LOS: 0 days | Discharge: NURSING, REHAB OR LONG-TERM CARE | End: 2018-01-02

## 2018-01-02 DIAGNOSIS — F41.9 ANXIETY DISORDER, UNSPECIFIED: ICD-10-CM

## 2018-01-02 DIAGNOSIS — Z91.013 ALLERGY TO SEAFOOD: ICD-10-CM

## 2018-01-02 DIAGNOSIS — Z88.0 ALLERGY STATUS TO PENICILLIN: ICD-10-CM

## 2018-01-02 DIAGNOSIS — Z79.82 LONG TERM (CURRENT) USE OF ASPIRIN: ICD-10-CM

## 2018-01-02 DIAGNOSIS — E03.9 HYPOTHYROIDISM, UNSPECIFIED: ICD-10-CM

## 2018-01-02 DIAGNOSIS — K21.9 GASTRO-ESOPHAGEAL REFLUX DISEASE WITHOUT ESOPHAGITIS: ICD-10-CM

## 2018-01-02 DIAGNOSIS — R10.13 EPIGASTRIC PAIN: ICD-10-CM

## 2018-01-02 DIAGNOSIS — J44.9 CHRONIC OBSTRUCTIVE PULMONARY DISEASE, UNSPECIFIED: ICD-10-CM

## 2018-01-02 DIAGNOSIS — Z98.890 OTHER SPECIFIED POSTPROCEDURAL STATES: ICD-10-CM

## 2018-01-02 DIAGNOSIS — R56.9 UNSPECIFIED CONVULSIONS: ICD-10-CM

## 2018-01-02 DIAGNOSIS — R27.0 ATAXIA, UNSPECIFIED: ICD-10-CM

## 2018-01-02 DIAGNOSIS — Z88.1 ALLERGY STATUS TO OTHER ANTIBIOTIC AGENTS STATUS: ICD-10-CM

## 2018-01-02 DIAGNOSIS — F25.9 SCHIZOAFFECTIVE DISORDER, UNSPECIFIED: ICD-10-CM
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
## 2018-01-02 DIAGNOSIS — E78.5 HYPERLIPIDEMIA, UNSPECIFIED: ICD-10-CM

## 2018-01-02 DIAGNOSIS — Z79.899 OTHER LONG TERM (CURRENT) DRUG THERAPY: ICD-10-CM

## 2018-01-02 DIAGNOSIS — G43.909 MIGRAINE, UNSPECIFIED, NOT INTRACTABLE, WITHOUT STATUS MIGRAINOSUS: ICD-10-CM

## 2018-01-08 ENCOUNTER — EMERGENCY (EMERGENCY)
Facility: HOSPITAL | Age: 73
LOS: 0 days | Discharge: HOME | End: 2018-01-08
Admitting: INTERNAL MEDICINE

## 2018-01-08 DIAGNOSIS — R56.9 UNSPECIFIED CONVULSIONS: ICD-10-CM

## 2018-01-08 DIAGNOSIS — F25.9 SCHIZOAFFECTIVE DISORDER, UNSPECIFIED: ICD-10-CM

## 2018-01-08 DIAGNOSIS — Z88.0 ALLERGY STATUS TO PENICILLIN: ICD-10-CM

## 2018-01-08 DIAGNOSIS — E03.9 HYPOTHYROIDISM, UNSPECIFIED: ICD-10-CM
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
## 2018-01-08 DIAGNOSIS — K21.9 GASTRO-ESOPHAGEAL REFLUX DISEASE WITHOUT ESOPHAGITIS: ICD-10-CM

## 2018-01-08 DIAGNOSIS — J44.9 CHRONIC OBSTRUCTIVE PULMONARY DISEASE, UNSPECIFIED: ICD-10-CM

## 2018-01-08 DIAGNOSIS — M54.5 LOW BACK PAIN: ICD-10-CM

## 2018-01-08 DIAGNOSIS — G43.909 MIGRAINE, UNSPECIFIED, NOT INTRACTABLE, WITHOUT STATUS MIGRAINOSUS: ICD-10-CM

## 2018-01-08 DIAGNOSIS — F41.9 ANXIETY DISORDER, UNSPECIFIED: ICD-10-CM

## 2018-01-08 DIAGNOSIS — E78.5 HYPERLIPIDEMIA, UNSPECIFIED: ICD-10-CM

## 2018-01-08 DIAGNOSIS — R27.0 ATAXIA, UNSPECIFIED: ICD-10-CM

## 2018-01-08 DIAGNOSIS — Z91.013 ALLERGY TO SEAFOOD: ICD-10-CM

## 2018-01-08 DIAGNOSIS — Z79.82 LONG TERM (CURRENT) USE OF ASPIRIN: ICD-10-CM

## 2018-01-08 DIAGNOSIS — F17.200 NICOTINE DEPENDENCE, UNSPECIFIED, UNCOMPLICATED: ICD-10-CM

## 2018-01-08 DIAGNOSIS — Z86.73 PERSONAL HISTORY OF TRANSIENT ISCHEMIC ATTACK (TIA), AND CEREBRAL INFARCTION WITHOUT RESIDUAL DEFICITS: ICD-10-CM

## 2018-02-05 ENCOUNTER — EMERGENCY (EMERGENCY)
Facility: HOSPITAL | Age: 73
LOS: 0 days | Discharge: NURSING, REHAB OR LONG-TERM CARE | End: 2018-02-05
Attending: EMERGENCY MEDICINE | Admitting: INTERNAL MEDICINE

## 2018-02-05 VITALS
DIASTOLIC BLOOD PRESSURE: 66 MMHG | SYSTOLIC BLOOD PRESSURE: 144 MMHG | RESPIRATION RATE: 18 BRPM | HEART RATE: 76 BPM | TEMPERATURE: 99 F | OXYGEN SATURATION: 97 %

## 2018-02-05 DIAGNOSIS — K08.89 OTHER SPECIFIED DISORDERS OF TEETH AND SUPPORTING STRUCTURES: ICD-10-CM

## 2018-02-05 DIAGNOSIS — Z79.899 OTHER LONG TERM (CURRENT) DRUG THERAPY: ICD-10-CM

## 2018-02-05 DIAGNOSIS — Z88.8 ALLERGY STATUS TO OTHER DRUGS, MEDICAMENTS AND BIOLOGICAL SUBSTANCES STATUS: ICD-10-CM

## 2018-02-05 DIAGNOSIS — J44.9 CHRONIC OBSTRUCTIVE PULMONARY DISEASE, UNSPECIFIED: ICD-10-CM

## 2018-02-05 DIAGNOSIS — Z79.82 LONG TERM (CURRENT) USE OF ASPIRIN: ICD-10-CM

## 2018-02-05 DIAGNOSIS — F17.200 NICOTINE DEPENDENCE, UNSPECIFIED, UNCOMPLICATED: ICD-10-CM

## 2018-02-05 DIAGNOSIS — K12.1 OTHER FORMS OF STOMATITIS: ICD-10-CM

## 2018-02-05 RX ORDER — IBUPROFEN 200 MG
600 TABLET ORAL ONCE
Qty: 0 | Refills: 0 | Status: COMPLETED | OUTPATIENT
Start: 2018-02-05 | End: 2018-02-05

## 2018-02-05 RX ADMIN — Medication 600 MILLIGRAM(S): at 21:50

## 2018-02-05 NOTE — CONSULT NOTE ADULT - ASSESSMENT
After clinical exam , patient had removed the upper dentures already. No warm on palpation, but intraorally is slight tender.   Presence of  fissuratum at the upper left buccal fold, very wide from the area near canine location towards upper left molar.   No dental  abscess   presence.

## 2018-02-05 NOTE — ED PROVIDER NOTE - ENMT NEGATIVE STATEMENT, MLM
+ left upper gum pain and face pain/swelling. Ears: no ear pain and no hearing problems.Nose: no nasal congestion and no nasal drainage.Mouth/Throat: no dysphagia, no hoarseness and no throat pain.Neck: no lumps, no pain, no stiffness and no swollen glands.

## 2018-02-05 NOTE — ED PROVIDER NOTE - PMH
Anemia    COPD (chronic obstructive pulmonary disease)    CVA (cerebral vascular accident)    Glaucoma, unspecified glaucoma type, unspecified laterality    HLD (hyperlipidemia)    Pseudoseizure    Schizoaffective disorder, depressive type    Vertigo

## 2018-02-05 NOTE — ED PROVIDER NOTE - ENMT, MLM
+ edentulous female + tenderness/erythema to left upper gum. + tenderness/mild swelling over left maxillary area.

## 2018-02-05 NOTE — ED PROVIDER NOTE - PROGRESS NOTE DETAILS
dental resident evaluated, swelling gum from poor fitting denture. advised stop using the denture and f/u at clinic tomorrow for dental xray.

## 2018-02-05 NOTE — CONSULT NOTE ADULT - CONSULT REASON
upper left  slight swelling , tenderness.   wearing  complete dentures upper and lower. Swelling started this morning .

## 2018-02-05 NOTE — ED PROVIDER NOTE - MEDICAL DECISION MAKING DETAILS
gum swelling. evaluated by dental. f/u at dental clinic gum swelling. evaluated by dental. f/u at dental clinic  I personally evaluated the patient. I reviewed the Resident’s or Physician Assistant’s note (as assigned above), and agree with the findings and plan except as documented in my note.

## 2018-02-05 NOTE — ED PROVIDER NOTE - ATTENDING CONTRIBUTION TO CARE
left upper gum pain edentoulus, no fevers, no trouble speaking, dental consult for block, fu with dental clinic.

## 2018-02-05 NOTE — CONSULT NOTE ADULT - PROBLEM SELECTOR RECOMMENDATION 9
Illfitting dentures, upper left epulis fissuratum, very wide.   recommended: removal of the dentures, do not use  pain medication : Ibuprofen   Slight warm salt water rinses.   Must come to dental clinic where as per the patient were done the dentures. XRAYS SHOULD BE TAKEN TO RULE OUT presence of any root tips and or patology

## 2018-02-06 NOTE — ED ADULT NURSE NOTE - CHPI ED SYMPTOMS NEG
no headache/no mouth sores/no fever/no nasal congestion/no bleeding gums/no ear pain/no decreased eating/drinking

## 2018-02-20 ENCOUNTER — EMERGENCY (EMERGENCY)
Facility: HOSPITAL | Age: 73
LOS: 0 days | Discharge: NURSING, REHAB OR LONG-TERM CARE | End: 2018-02-20
Attending: EMERGENCY MEDICINE | Admitting: INTERNAL MEDICINE

## 2018-02-20 VITALS
HEART RATE: 82 BPM | OXYGEN SATURATION: 97 % | DIASTOLIC BLOOD PRESSURE: 77 MMHG | RESPIRATION RATE: 18 BRPM | SYSTOLIC BLOOD PRESSURE: 144 MMHG | TEMPERATURE: 98 F

## 2018-02-20 VITALS
TEMPERATURE: 98 F | DIASTOLIC BLOOD PRESSURE: 65 MMHG | SYSTOLIC BLOOD PRESSURE: 135 MMHG | RESPIRATION RATE: 18 BRPM | OXYGEN SATURATION: 98 % | HEART RATE: 84 BPM

## 2018-02-20 DIAGNOSIS — E03.9 HYPOTHYROIDISM, UNSPECIFIED: ICD-10-CM

## 2018-02-20 DIAGNOSIS — F17.200 NICOTINE DEPENDENCE, UNSPECIFIED, UNCOMPLICATED: ICD-10-CM

## 2018-02-20 DIAGNOSIS — R55 SYNCOPE AND COLLAPSE: ICD-10-CM

## 2018-02-20 DIAGNOSIS — J44.9 CHRONIC OBSTRUCTIVE PULMONARY DISEASE, UNSPECIFIED: ICD-10-CM

## 2018-02-20 DIAGNOSIS — Z79.82 LONG TERM (CURRENT) USE OF ASPIRIN: ICD-10-CM

## 2018-02-20 DIAGNOSIS — R42 DIZZINESS AND GIDDINESS: ICD-10-CM

## 2018-02-20 DIAGNOSIS — Z79.899 OTHER LONG TERM (CURRENT) DRUG THERAPY: ICD-10-CM

## 2018-02-20 DIAGNOSIS — E78.5 HYPERLIPIDEMIA, UNSPECIFIED: ICD-10-CM

## 2018-02-20 DIAGNOSIS — Z88.8 ALLERGY STATUS TO OTHER DRUGS, MEDICAMENTS AND BIOLOGICAL SUBSTANCES STATUS: ICD-10-CM

## 2018-02-20 LAB
ALBUMIN SERPL ELPH-MCNC: 3.9 G/DL — SIGNIFICANT CHANGE UP (ref 3–5.5)
ALP SERPL-CCNC: 68 U/L — SIGNIFICANT CHANGE UP (ref 30–115)
ALT FLD-CCNC: 9 U/L — SIGNIFICANT CHANGE UP (ref 0–41)
ANION GAP SERPL CALC-SCNC: 12 MMOL/L — SIGNIFICANT CHANGE UP (ref 7–14)
AST SERPL-CCNC: 15 U/L — SIGNIFICANT CHANGE UP (ref 0–41)
BILIRUB SERPL-MCNC: 0.5 MG/DL — SIGNIFICANT CHANGE UP (ref 0.2–1.2)
BUN SERPL-MCNC: 25 MG/DL — HIGH (ref 10–20)
CALCIUM SERPL-MCNC: 9.6 MG/DL — SIGNIFICANT CHANGE UP (ref 8.5–10.1)
CHLORIDE SERPL-SCNC: 105 MMOL/L — SIGNIFICANT CHANGE UP (ref 98–110)
CK MB BLD-MCNC: 2 % — SIGNIFICANT CHANGE UP (ref 0–4)
CK MB CFR SERPL CALC: 1.8 NG/ML — SIGNIFICANT CHANGE UP (ref 0.6–6.3)
CK SERPL-CCNC: 96 U/L — SIGNIFICANT CHANGE UP (ref 0–225)
CO2 SERPL-SCNC: 27 MMOL/L — SIGNIFICANT CHANGE UP (ref 17–32)
CREAT SERPL-MCNC: 0.8 MG/DL — SIGNIFICANT CHANGE UP (ref 0.7–1.5)
GLUCOSE SERPL-MCNC: 97 MG/DL — SIGNIFICANT CHANGE UP (ref 70–110)
HCT VFR BLD CALC: 40.6 % — SIGNIFICANT CHANGE UP (ref 37–47)
HGB BLD-MCNC: 13.5 G/DL — LOW (ref 14–18)
MAGNESIUM SERPL-MCNC: 2 MG/DL — SIGNIFICANT CHANGE UP (ref 1.8–2.4)
MCHC RBC-ENTMCNC: 30.3 PG — SIGNIFICANT CHANGE UP (ref 27–31)
MCHC RBC-ENTMCNC: 33.3 G/DL — SIGNIFICANT CHANGE UP (ref 32–37)
MCV RBC AUTO: 91 FL — SIGNIFICANT CHANGE UP (ref 81–91)
NRBC # BLD: 0 /100 WBCS — SIGNIFICANT CHANGE UP (ref 0–0)
PLATELET # BLD AUTO: 116 K/UL — LOW (ref 130–400)
POTASSIUM SERPL-MCNC: 4.5 MMOL/L — SIGNIFICANT CHANGE UP (ref 3.5–5)
POTASSIUM SERPL-SCNC: 4.5 MMOL/L — SIGNIFICANT CHANGE UP (ref 3.5–5)
PROT SERPL-MCNC: 7.2 G/DL — SIGNIFICANT CHANGE UP (ref 6–8)
RBC # BLD: 4.46 M/UL — SIGNIFICANT CHANGE UP (ref 4.2–5.4)
RBC # FLD: 14.2 % — SIGNIFICANT CHANGE UP (ref 11.5–14.5)
SODIUM SERPL-SCNC: 144 MMOL/L — SIGNIFICANT CHANGE UP (ref 135–146)
TROPONIN I SERPL-MCNC: <0.02 NG/ML — SIGNIFICANT CHANGE UP (ref 0–0.05)
WBC # BLD: 4.26 K/UL — LOW (ref 4.8–10.8)
WBC # FLD AUTO: 4.26 K/UL — LOW (ref 4.8–10.8)

## 2018-02-20 RX ORDER — SODIUM CHLORIDE 9 MG/ML
1000 INJECTION INTRAMUSCULAR; INTRAVENOUS; SUBCUTANEOUS ONCE
Qty: 0 | Refills: 0 | Status: COMPLETED | OUTPATIENT
Start: 2018-02-20 | End: 2018-02-20

## 2018-02-20 RX ORDER — ONDANSETRON 8 MG/1
4 TABLET, FILM COATED ORAL EVERY 6 HOURS
Qty: 0 | Refills: 0 | Status: DISCONTINUED | OUTPATIENT
Start: 2018-02-21 | End: 2018-02-20

## 2018-02-20 RX ORDER — MECLIZINE HCL 12.5 MG
25 TABLET ORAL ONCE
Qty: 0 | Refills: 0 | Status: COMPLETED | OUTPATIENT
Start: 2018-02-20 | End: 2018-02-20

## 2018-02-20 RX ORDER — ONDANSETRON 8 MG/1
4 TABLET, FILM COATED ORAL ONCE
Qty: 0 | Refills: 0 | Status: COMPLETED | OUTPATIENT
Start: 2018-02-20 | End: 2018-02-20

## 2018-02-20 RX ORDER — ONDANSETRON 8 MG/1
TABLET, FILM COATED ORAL
Qty: 0 | Refills: 0 | Status: DISCONTINUED | OUTPATIENT
Start: 2018-02-20 | End: 2018-02-20

## 2018-02-20 RX ADMIN — SODIUM CHLORIDE 2000 MILLILITER(S): 9 INJECTION INTRAMUSCULAR; INTRAVENOUS; SUBCUTANEOUS at 17:51

## 2018-02-20 RX ADMIN — ONDANSETRON 104 MILLIGRAM(S): 8 TABLET, FILM COATED ORAL at 17:51

## 2018-02-20 RX ADMIN — Medication 25 MILLIGRAM(S): at 16:21

## 2018-02-20 NOTE — ED PROVIDER NOTE - PROGRESS NOTE DETAILS
I personally evaluated the patient. I reviewed the Resident’s or Physician Assistant’s note (as assigned above), and agree with the findings and plan except as documented in my note.   73 Y/O F HYPOTHYROIDISM, PSEUDOSEIZURES, SCHIZOAFFECTIVE D/O, BIPOLAR D/O, VERTIGO BROUGHT TO ED FROM DENTAL CLINIC AFTER SYNCOPAL EPISODE. NO SEIZURE LIKE ACTIVITY. NO INCONTINENCE OF TONGUE BITING. PT REPORTS DIZZINESS PRIOR TO SYNCOPE. PT C/O VERTIUGINOUS SXS CURRENTLY. + NAUSEA AND AN EPISODE OF VOMITING TODAY. NO HEADACHE. NO CP, SOB, COUGH. NO FEVER, CHILL.S NO RECENT ILLNESS, URI SXS. VITALS NOTED. ALERT OX3 NAD NCAT EOMI. + NYSTAGMUS. OP NORMAL. MMM. NECK SUPPLE. LUNGS CLEAR B/L. RRR S1S2. ABD- SOFT FLAT NONTENDER. CN 2-12 INTACT. NEURO EXAM NONFOCAL.

## 2018-02-20 NOTE — ED PROVIDER NOTE - CARE PLAN
Principal Discharge DX:	Syncope  Goal:	Prevent Recurrence  Assessment and plan of treatment:	Maintain hydration, slow movement from different positions

## 2018-02-20 NOTE — ED PROVIDER NOTE - NS ED ROS FT
REVIEW OF SYSTEMS:    CONSTITUTIONAL: No weakness, fevers or chills  EYES/ENT: No visual changes;  No vertigo or throat pain   NECK: No pain or stiffness  RESPIRATORY: No cough, wheezing, hemoptysis; No shortness of breath  CARDIOVASCULAR: No chest pain or palpitations  GASTROINTESTINAL: No abdominal or epigastric pain. No nausea, vomiting, or hematemesis; No diarrhea or constipation. No melena or hematochezia.  GENITOURINARY: No dysuria, frequency or hematuria  NEUROLOGICAL: Dizzy  SKIN: No itching, rashes

## 2018-02-20 NOTE — ED ADULT TRIAGE NOTE - CHIEF COMPLAINT QUOTE
pt seen in dental clinic reports feeling dizzy and weak had syncopal episode witnessed by staff. pt denies chest pain

## 2018-02-20 NOTE — ED PROVIDER NOTE - PHYSICAL EXAMINATION

## 2018-02-20 NOTE — ED PROVIDER NOTE - OBJECTIVE STATEMENT
73yo F poor historian from [facility] w/ PMHx pseudoseizures, vertigo, hypothyroidism, bipolar d/o, schizoaffective was at dental clinic, began feeling dizzy and then had a syncopal episode.  Pt. describes dizziness as feeling lightheaded, when she came to, she was aware of the event, she denies any nausea/vomiting, head trauma, tongue biting, eye rolling, urinary/fecal incontinence.  Pt. ambulates with a walker, constantly getting out of bed and walking around the unit.

## 2018-04-10 ENCOUNTER — EMERGENCY (EMERGENCY)
Facility: HOSPITAL | Age: 73
LOS: 0 days | Discharge: NURSING, REHAB OR LONG-TERM CARE | End: 2018-04-10
Attending: EMERGENCY MEDICINE

## 2018-04-10 VITALS
OXYGEN SATURATION: 98 % | RESPIRATION RATE: 18 BRPM | DIASTOLIC BLOOD PRESSURE: 76 MMHG | HEART RATE: 56 BPM | SYSTOLIC BLOOD PRESSURE: 144 MMHG | TEMPERATURE: 97 F

## 2018-04-10 VITALS
HEART RATE: 68 BPM | OXYGEN SATURATION: 98 % | SYSTOLIC BLOOD PRESSURE: 134 MMHG | DIASTOLIC BLOOD PRESSURE: 78 MMHG | TEMPERATURE: 98 F | RESPIRATION RATE: 18 BRPM

## 2018-04-10 DIAGNOSIS — R42 DIZZINESS AND GIDDINESS: ICD-10-CM

## 2018-04-10 DIAGNOSIS — J44.9 CHRONIC OBSTRUCTIVE PULMONARY DISEASE, UNSPECIFIED: ICD-10-CM

## 2018-04-10 DIAGNOSIS — Z88.8 ALLERGY STATUS TO OTHER DRUGS, MEDICAMENTS AND BIOLOGICAL SUBSTANCES STATUS: ICD-10-CM

## 2018-04-10 DIAGNOSIS — Z86.73 PERSONAL HISTORY OF TRANSIENT ISCHEMIC ATTACK (TIA), AND CEREBRAL INFARCTION WITHOUT RESIDUAL DEFICITS: ICD-10-CM

## 2018-04-10 DIAGNOSIS — Z79.899 OTHER LONG TERM (CURRENT) DRUG THERAPY: ICD-10-CM

## 2018-04-10 DIAGNOSIS — Z79.82 LONG TERM (CURRENT) USE OF ASPIRIN: ICD-10-CM

## 2018-04-10 DIAGNOSIS — E78.5 HYPERLIPIDEMIA, UNSPECIFIED: ICD-10-CM

## 2018-04-10 DIAGNOSIS — F25.9 SCHIZOAFFECTIVE DISORDER, UNSPECIFIED: ICD-10-CM

## 2018-04-10 DIAGNOSIS — E03.9 HYPOTHYROIDISM, UNSPECIFIED: ICD-10-CM

## 2018-04-10 DIAGNOSIS — Z88.0 ALLERGY STATUS TO PENICILLIN: ICD-10-CM

## 2018-04-10 LAB
ALBUMIN SERPL ELPH-MCNC: 3.3 G/DL — LOW (ref 3.5–5.2)
ALP SERPL-CCNC: 70 U/L — SIGNIFICANT CHANGE UP (ref 30–115)
ALT FLD-CCNC: <5 U/L — SIGNIFICANT CHANGE UP (ref 0–41)
ANION GAP SERPL CALC-SCNC: 9 MMOL/L — SIGNIFICANT CHANGE UP (ref 7–14)
AST SERPL-CCNC: 12 U/L — SIGNIFICANT CHANGE UP (ref 0–41)
BILIRUB SERPL-MCNC: <0.2 MG/DL — SIGNIFICANT CHANGE UP (ref 0.2–1.2)
BUN SERPL-MCNC: 21 MG/DL — HIGH (ref 10–20)
CALCIUM SERPL-MCNC: 8.3 MG/DL — LOW (ref 8.5–10.1)
CHLORIDE SERPL-SCNC: 105 MMOL/L — SIGNIFICANT CHANGE UP (ref 98–110)
CK SERPL-CCNC: 57 U/L — SIGNIFICANT CHANGE UP (ref 0–225)
CO2 SERPL-SCNC: 28 MMOL/L — SIGNIFICANT CHANGE UP (ref 17–32)
CREAT SERPL-MCNC: 0.7 MG/DL — SIGNIFICANT CHANGE UP (ref 0.7–1.5)
GLUCOSE SERPL-MCNC: 94 MG/DL — SIGNIFICANT CHANGE UP (ref 70–99)
HCT VFR BLD CALC: 37.6 % — SIGNIFICANT CHANGE UP (ref 37–47)
HGB BLD-MCNC: 12.3 G/DL — SIGNIFICANT CHANGE UP (ref 12–16)
MCHC RBC-ENTMCNC: 30 PG — SIGNIFICANT CHANGE UP (ref 27–31)
MCHC RBC-ENTMCNC: 32.7 G/DL — SIGNIFICANT CHANGE UP (ref 32–37)
MCV RBC AUTO: 91.7 FL — SIGNIFICANT CHANGE UP (ref 81–99)
NRBC # BLD: 0 /100 WBCS — SIGNIFICANT CHANGE UP (ref 0–0)
PLATELET # BLD AUTO: 126 K/UL — LOW (ref 130–400)
POTASSIUM SERPL-MCNC: 3.9 MMOL/L — SIGNIFICANT CHANGE UP (ref 3.5–5)
POTASSIUM SERPL-SCNC: 3.9 MMOL/L — SIGNIFICANT CHANGE UP (ref 3.5–5)
PROT SERPL-MCNC: 5.9 G/DL — LOW (ref 6–8)
RBC # BLD: 4.1 M/UL — LOW (ref 4.2–5.4)
RBC # FLD: 14.6 % — HIGH (ref 11.5–14.5)
SODIUM SERPL-SCNC: 142 MMOL/L — SIGNIFICANT CHANGE UP (ref 135–146)
TROPONIN T SERPL-MCNC: <0.01 NG/ML — SIGNIFICANT CHANGE UP
WBC # BLD: 4.51 K/UL — LOW (ref 4.8–10.8)
WBC # FLD AUTO: 4.51 K/UL — LOW (ref 4.8–10.8)

## 2018-04-10 NOTE — ED PROVIDER NOTE - ATTENDING CONTRIBUTION TO CARE
71yo f from [facility] h/o cva/tia, schizoaff d/o, copd, glaucoma, pseudoseizures, vertigo, hypothyroidism, mult prior ED visits, p/w dizziness x 1 week. Described as vertigo sensation, which she states is chronic. Denies ha, loc, visual changes, cp/sob, nvd, abd pain, focal numbness or weakness. PE: elderly f nad, ncat, perrl/eomi, neck supple no jvd or bruits, rrr nl s1s2 no mrg, ctab no wrr, abd soft ntnd no palpable masses no rgr, no cvat, ext no cce dpi, neuro aaox3, cn 2-12 intact bl no nystagmus or facial droop, 5/5 strength x 4 no drift, gross sensation intact, finger-nose nl, gait wnl using walker. a/p: dizziness x 1 week, h/o cva/tia as well as chronic vertigo - will chk labs, ct head, and reassess.

## 2018-04-10 NOTE — ED PROVIDER NOTE - PROGRESS NOTE DETAILS
pt feeling much better, dizziness resolved, all results d/w pt, comfortable to go home, will f/u with private Neuro Dr. Kay

## 2018-04-10 NOTE — ED PROVIDER NOTE - OBJECTIVE STATEMENT
73yo F hx CVA/TIAs, Hypothyroidism, schizoaffective d/o, BPD, vertigo p/w worsening dizziness anjd unsteady gait x1wk- today, was dizzy, fell off toward R side, caught by friends- now consistently dizzy- no other complaints- no headache, vision changes, numbness/weakness, chest pain, shortness of breath, f/c/n/v/d, abdominal pain, numbness/weakness, back pain, dysuria/hematuria

## 2018-04-10 NOTE — ED ADULT NURSE NOTE - OBJECTIVE STATEMENT
Patient c/o dizzy spells over the past week progressively getting worse. Adamant to go out and smoke.

## 2018-05-13 ENCOUNTER — EMERGENCY (EMERGENCY)
Facility: HOSPITAL | Age: 73
LOS: 0 days | Discharge: HOME | End: 2018-05-14
Attending: EMERGENCY MEDICINE | Admitting: EMERGENCY MEDICINE

## 2018-05-13 VITALS
HEART RATE: 63 BPM | RESPIRATION RATE: 18 BRPM | OXYGEN SATURATION: 100 % | DIASTOLIC BLOOD PRESSURE: 63 MMHG | SYSTOLIC BLOOD PRESSURE: 139 MMHG | TEMPERATURE: 97 F

## 2018-05-13 DIAGNOSIS — F17.210 NICOTINE DEPENDENCE, CIGARETTES, UNCOMPLICATED: ICD-10-CM

## 2018-05-13 DIAGNOSIS — Z88.0 ALLERGY STATUS TO PENICILLIN: ICD-10-CM

## 2018-05-13 DIAGNOSIS — E03.9 HYPOTHYROIDISM, UNSPECIFIED: ICD-10-CM

## 2018-05-13 DIAGNOSIS — M25.562 PAIN IN LEFT KNEE: ICD-10-CM

## 2018-05-13 DIAGNOSIS — Z79.82 LONG TERM (CURRENT) USE OF ASPIRIN: ICD-10-CM

## 2018-05-13 DIAGNOSIS — J44.9 CHRONIC OBSTRUCTIVE PULMONARY DISEASE, UNSPECIFIED: ICD-10-CM

## 2018-05-13 DIAGNOSIS — Z88.8 ALLERGY STATUS TO OTHER DRUGS, MEDICAMENTS AND BIOLOGICAL SUBSTANCES STATUS: ICD-10-CM

## 2018-05-13 DIAGNOSIS — Z79.899 OTHER LONG TERM (CURRENT) DRUG THERAPY: ICD-10-CM

## 2018-05-13 DIAGNOSIS — M25.552 PAIN IN LEFT HIP: ICD-10-CM

## 2018-05-13 DIAGNOSIS — E78.5 HYPERLIPIDEMIA, UNSPECIFIED: ICD-10-CM

## 2018-05-13 RX ORDER — OXYCODONE AND ACETAMINOPHEN 5; 325 MG/1; MG/1
1 TABLET ORAL ONCE
Qty: 0 | Refills: 0 | Status: DISCONTINUED | OUTPATIENT
Start: 2018-05-13 | End: 2018-05-13

## 2018-05-13 RX ORDER — SODIUM CHLORIDE 9 MG/ML
1000 INJECTION INTRAMUSCULAR; INTRAVENOUS; SUBCUTANEOUS ONCE
Qty: 0 | Refills: 0 | Status: DISCONTINUED | OUTPATIENT
Start: 2018-05-13 | End: 2018-05-13

## 2018-05-13 RX ORDER — ONDANSETRON 8 MG/1
4 TABLET, FILM COATED ORAL ONCE
Qty: 0 | Refills: 0 | Status: DISCONTINUED | OUTPATIENT
Start: 2018-05-13 | End: 2018-05-13

## 2018-05-13 RX ADMIN — OXYCODONE AND ACETAMINOPHEN 1 TABLET(S): 5; 325 TABLET ORAL at 19:19

## 2018-05-13 RX ADMIN — OXYCODONE AND ACETAMINOPHEN 1 TABLET(S): 5; 325 TABLET ORAL at 20:00

## 2018-05-13 NOTE — ED PROVIDER NOTE - PROGRESS NOTE DETAILS
Likely menisceal injury given knee locking and pain and focal tenderness and negative xray. Will place ace wrap and d/c to ortho follow up with return precautions.

## 2018-05-13 NOTE — ED PROVIDER NOTE - CONSTITUTIONAL, MLM
normal... Well appearing, well nourished, awake, alert, oriented to person, place, time/situation, in no acute distress

## 2018-05-13 NOTE — ED PROVIDER NOTE - MUSCULOSKELETAL, MLM
Spine appears normal, range of motion is not limited, no muscle or joint tenderness Spine appears normal, UE bilaterally full ROM, distal pulses intact, Right LE full ROM, distal pulses intact;  Left LE hip full ROM able to extend and flex there is point tenderness to the lateral aspect; knee full ROM able to extend and flex no laxity, foot full ROM, DP and PT 2+.

## 2018-05-13 NOTE — ED PROVIDER NOTE - ATTENDING CONTRIBUTION TO CARE
72 y F PMH CVA/TIA, hypothyroidism, bipolar d/o, pseudoseizures, vertigo, pw compaint of knee pain 72 y F PMH CVA/TIA, hypothyroidism, schizoaffective, bipolar d/o, pseudoseizures, vertigo, pw compaint of left hip and knee pain x 2 wks. ambulatory without assistance. Exam shows full ROM. 72 y F PMH CVA/TIA, hypothyroidism, schizoaffective, bipolar d/o, pseudoseizures, vertigo, pw compaint of left hip and knee pain x 2 wks. ambulatory without assistance. Exam shows full ROM., mild effusion medially, and medial joint line tenderness to palpation over MCL.  A/p: most likely internal derangement of knee without trauma, no fever or other finding to suggest dangerous pathology. x ray, reassess.

## 2018-05-13 NOTE — ED PROVIDER NOTE - OBJECTIVE STATEMENT
73 yo F with hx of CVA/TIA, hypothyroidism, bipolar d/o, pseudoseizures, vertigo, presents for evaluation of left knee pain, ongoing for a few weeks, associated with left hip pain. NO fever, no chills, no headache, no nausea, no vomiting, no chest pain, no back pain, no abdominal pain, no 71 yo F with hx of CVA/TIA, hypothyroidism, bipolar d/o, pseudoseizures, vertigo, presents for evaluation of left knee pain, ongoing for a few weeks, associated with left hip pain. NO fever, no chills, no headache, no nausea, no vomiting, no chest pain, no back pain, no abdominal pain, no recent fall, no trauma. Patient states that she had a seizure today that she remembers, but when speaking to patient she is endorsing her left knee pain which has been ongoing for a few weeks as the cause of what brought her to the ED. Patient does not take any statin. Patient denies any other symptoms.

## 2018-05-13 NOTE — ED PROVIDER NOTE - MUSCULOSKELETAL NEGATIVE STATEMENT, MLM
+ left knee pain, +left hip pain, no back pain, no gout, no musculoskeletal pain, no neck pain, and no weakness.

## 2018-05-13 NOTE — ED PROVIDER NOTE - MEDICAL DECISION MAKING DETAILS
Patient to be discharged from ED. Any available test results were discussed with patient. Verbal instructions given, including instructions to return to ED immediately for any new, worsening, or concerning symptoms. Patient endorsed understanding. Written discharge instructions additionally given, including follow-up plan.

## 2018-05-14 VITALS
TEMPERATURE: 98 F | RESPIRATION RATE: 18 BRPM | OXYGEN SATURATION: 97 % | SYSTOLIC BLOOD PRESSURE: 138 MMHG | DIASTOLIC BLOOD PRESSURE: 65 MMHG | HEART RATE: 68 BPM

## 2018-06-18 ENCOUNTER — OUTPATIENT (OUTPATIENT)
Dept: OUTPATIENT SERVICES | Facility: HOSPITAL | Age: 73
LOS: 1 days | Discharge: HOME | End: 2018-06-18

## 2018-06-18 DIAGNOSIS — M25.562 PAIN IN LEFT KNEE: ICD-10-CM

## 2018-06-19 ENCOUNTER — EMERGENCY (EMERGENCY)
Facility: HOSPITAL | Age: 73
LOS: 0 days | Discharge: NURSING, REHAB OR LONG-TERM CARE | End: 2018-06-19
Attending: EMERGENCY MEDICINE | Admitting: EMERGENCY MEDICINE

## 2018-06-19 VITALS
TEMPERATURE: 97 F | DIASTOLIC BLOOD PRESSURE: 55 MMHG | OXYGEN SATURATION: 98 % | HEIGHT: 62 IN | WEIGHT: 111.99 LBS | RESPIRATION RATE: 18 BRPM | HEART RATE: 70 BPM | SYSTOLIC BLOOD PRESSURE: 128 MMHG

## 2018-06-19 DIAGNOSIS — Z88.8 ALLERGY STATUS TO OTHER DRUGS, MEDICAMENTS AND BIOLOGICAL SUBSTANCES: ICD-10-CM

## 2018-06-19 DIAGNOSIS — Z88.0 ALLERGY STATUS TO PENICILLIN: ICD-10-CM

## 2018-06-19 DIAGNOSIS — I25.10 ATHEROSCLEROTIC HEART DISEASE OF NATIVE CORONARY ARTERY WITHOUT ANGINA PECTORIS: ICD-10-CM

## 2018-06-19 DIAGNOSIS — Z79.82 LONG TERM (CURRENT) USE OF ASPIRIN: ICD-10-CM

## 2018-06-19 DIAGNOSIS — M25.569 PAIN IN UNSPECIFIED KNEE: ICD-10-CM

## 2018-06-19 DIAGNOSIS — J44.9 CHRONIC OBSTRUCTIVE PULMONARY DISEASE, UNSPECIFIED: ICD-10-CM

## 2018-06-19 DIAGNOSIS — M25.562 PAIN IN LEFT KNEE: ICD-10-CM

## 2018-06-19 DIAGNOSIS — M25.462 EFFUSION, LEFT KNEE: ICD-10-CM

## 2018-06-19 DIAGNOSIS — Z79.899 OTHER LONG TERM (CURRENT) DRUG THERAPY: ICD-10-CM

## 2018-06-19 DIAGNOSIS — G89.29 OTHER CHRONIC PAIN: ICD-10-CM

## 2018-06-19 DIAGNOSIS — F25.1 SCHIZOAFFECTIVE DISORDER, DEPRESSIVE TYPE: ICD-10-CM

## 2018-06-19 DIAGNOSIS — K21.9 GASTRO-ESOPHAGEAL REFLUX DISEASE WITHOUT ESOPHAGITIS: ICD-10-CM

## 2018-06-19 DIAGNOSIS — E78.5 HYPERLIPIDEMIA, UNSPECIFIED: ICD-10-CM

## 2018-06-19 RX ORDER — KETOROLAC TROMETHAMINE 30 MG/ML
15 SYRINGE (ML) INJECTION ONCE
Qty: 0 | Refills: 0 | Status: DISCONTINUED | OUTPATIENT
Start: 2018-06-19 | End: 2018-06-19

## 2018-06-19 RX ADMIN — Medication 15 MILLIGRAM(S): at 13:04

## 2018-06-19 NOTE — ED PROVIDER NOTE - NS ED ROS FT
CONSTITUTIONAL: No weakness, fevers, or chills   NECK: No pain or stiffness  RESPIRATORY: No shortness of breath  CARDIOVASCULAR: No chest pain or palpitations  GASTROINTESTINAL: No abdominal or epigastric pain. No nausea or vomiting  NEUROLOGICAL: No numbness or weakness or tingling to extremities.  SKIN: No itching, rashes  MSK: + left knee pain

## 2018-06-19 NOTE — ED PROVIDER NOTE - OBJECTIVE STATEMENT
73 yo female from [facility] with PMH of stroke, GERD, seizure d/o, chronic vertigo, schizoaffective d/o presents to the ED c/o chronic left knee pain that has been occurring intermittently for years.  Patient denies taking any medication for the pain.  Patient was seen by ortho, negative X-Rays. Patient denies fever, chills, SOB, chest pain, fall, trauma, numbness/tingling to the extremities, hx of blood clots, or back pain.

## 2018-06-19 NOTE — ED PROVIDER NOTE - ATTENDING CONTRIBUTION TO CARE
I personally evaluated the patient. I reviewed the Resident’s or Physician Assistant’s note (as assigned above), and agree with the findings and plan except as documented in my note.  no swelling, no warmth/erythema, imaging negative yesterday, joint stable extensor mechanism intact NVI distally, ambulating about ED after toradol

## 2018-06-19 NOTE — ED PROVIDER NOTE - PROGRESS NOTE DETAILS
Patient is feeling better after being given Toradol in the ED. Patient is able to ambulate in the ED and requesting to go home. PA fellow note reviewed, ambulating well in ED after toradol, Dc'd home

## 2018-06-19 NOTE — ED PROVIDER NOTE - DIAGNOSIS COUNSELING, MDM
conducted a detailed discussion... Patient to be discharged from ED. Any available test results were discussed with patient. Verbal instructions given, including instructions to return to ED immediately for any new, worsening, or concerning symptoms. Patient endorsed understanding. Written discharge instructions additionally given, including follow-up plan.

## 2018-06-19 NOTE — ED PROVIDER NOTE - PHYSICAL EXAMINATION
GENERAL: NAD, well-developed  HEAD:  Atraumatic, Normocephalic  NECK: No cervical or paraspinal tenderness.  CHEST/LUNG: Clear to auscultation bilaterally; No wheeze, rhonchi, or rales  HEART: Regular rate and rhythm; No murmurs, rubs, or gallops  EXTREMITIES:  Radial and pedal pulses presents B/L. No calf tenderness B/L.  PSYCH: AAOx3, cooperative, appropriate  NEUROLOGY: AAOx3,Strength 5/5 throughout. Sensation intact. Patient ambulates with brink at baseline.  MSK: + mild swelling to medial aspect of left knee. Limited ROM of left knee secondary to pain.  No visible signs of skin mottling or color changes. No visible signs of trauma, ecchymosis, or erythema.  SKIN: Warm, dry, No rashes. Good cap refill < 2 seconds.

## 2018-06-25 ENCOUNTER — EMERGENCY (EMERGENCY)
Facility: HOSPITAL | Age: 73
LOS: 0 days | Discharge: NURSING, REHAB OR LONG-TERM CARE | End: 2018-06-25
Attending: EMERGENCY MEDICINE | Admitting: EMERGENCY MEDICINE

## 2018-06-25 VITALS
TEMPERATURE: 96 F | SYSTOLIC BLOOD PRESSURE: 133 MMHG | DIASTOLIC BLOOD PRESSURE: 56 MMHG | WEIGHT: 115.08 LBS | HEART RATE: 55 BPM | OXYGEN SATURATION: 99 % | RESPIRATION RATE: 18 BRPM

## 2018-06-25 DIAGNOSIS — G89.29 OTHER CHRONIC PAIN: ICD-10-CM

## 2018-06-25 DIAGNOSIS — Z86.73 PERSONAL HISTORY OF TRANSIENT ISCHEMIC ATTACK (TIA), AND CEREBRAL INFARCTION WITHOUT RESIDUAL DEFICITS: ICD-10-CM

## 2018-06-25 DIAGNOSIS — K21.9 GASTRO-ESOPHAGEAL REFLUX DISEASE WITHOUT ESOPHAGITIS: ICD-10-CM

## 2018-06-25 DIAGNOSIS — M25.569 PAIN IN UNSPECIFIED KNEE: ICD-10-CM

## 2018-06-25 DIAGNOSIS — Z79.82 LONG TERM (CURRENT) USE OF ASPIRIN: ICD-10-CM

## 2018-06-25 DIAGNOSIS — F25.9 SCHIZOAFFECTIVE DISORDER, UNSPECIFIED: ICD-10-CM

## 2018-06-25 DIAGNOSIS — E78.5 HYPERLIPIDEMIA, UNSPECIFIED: ICD-10-CM

## 2018-06-25 DIAGNOSIS — M25.562 PAIN IN LEFT KNEE: ICD-10-CM

## 2018-06-25 DIAGNOSIS — Z88.8 ALLERGY STATUS TO OTHER DRUGS, MEDICAMENTS AND BIOLOGICAL SUBSTANCES: ICD-10-CM

## 2018-06-25 DIAGNOSIS — G40.909 EPILEPSY, UNSPECIFIED, NOT INTRACTABLE, WITHOUT STATUS EPILEPTICUS: ICD-10-CM

## 2018-06-25 DIAGNOSIS — Z79.899 OTHER LONG TERM (CURRENT) DRUG THERAPY: ICD-10-CM

## 2018-06-25 DIAGNOSIS — J44.9 CHRONIC OBSTRUCTIVE PULMONARY DISEASE, UNSPECIFIED: ICD-10-CM

## 2018-06-25 DIAGNOSIS — Z88.0 ALLERGY STATUS TO PENICILLIN: ICD-10-CM

## 2018-06-25 RX ORDER — KETOROLAC TROMETHAMINE 30 MG/ML
30 SYRINGE (ML) INJECTION ONCE
Qty: 0 | Refills: 0 | Status: DISCONTINUED | OUTPATIENT
Start: 2018-06-25 | End: 2018-06-25

## 2018-06-25 RX ADMIN — Medication 30 MILLIGRAM(S): at 04:56

## 2018-06-25 RX ADMIN — Medication 30 MILLIGRAM(S): at 05:01

## 2018-06-25 NOTE — ED PROVIDER NOTE - NS ED ROS FT
Constitutional: See HPI.  Eyes: No visual changes, eye pain or discharge.  ENMT:  No neck pain or stiffness.  Cardiac: No chest pain, SOB or edema. No chest pain with exertion.  Respiratory: No cough or respiratory distress.   GI: No nausea, vomiting, diarrhea or abdominal pain.  : No dysuria, frequency or burning.  MS: No myalgia, muscle weakness.  +knee pain.  Neuro: No headache or weakness. No LOC.  Skin: No skin rash.

## 2018-06-25 NOTE — ED PROVIDER NOTE - PHYSICAL EXAMINATION
AOx4, Non toxic appearing, NAD, speaking in full sentences. Skin  warm and dry, no acute rash. Head normocephalic, atraumatic. PERRLA/EOMI, conjunctiva and sclera clear. Heart RRR s1s2 nl, no rub/murmur. Lungs- No retractions, BS equal, CTAB. Abdomen soft ntnd no r/g. Extremities- moves all, +equal distal pulses, brisk cap refill, sensation wnl, normal ROM. No LE edema, calves nttp b/l.

## 2018-06-25 NOTE — ED PROVIDER NOTE - OBJECTIVE STATEMENT
73 yo F from [facility] with PMH of stroke, GERD, seizure d/o, chronic vertigo, schizoaffective d/o presents to the ED c/o chronic left knee pain that has been occurring intermittently for years.  Doesn't taking any medication for the pain.  Patient was seen by ortho, negative X-Rays. Denies trauma to the area, fever, chills, SOB, chest pain, fall, trauma, numbness/tingling to the extremities, hx of blood clots, or back pain.

## 2018-06-25 NOTE — ED PROVIDER NOTE - ATTENDING CONTRIBUTION TO CARE
73 yo female c/o chronic left knee pain she's been having for years. Being followed by ortho. Patient denies f/c, CP/SOB, trauma, numbness/tingling to the extremities, hx of blood clots, or back pain. Agree with resident's exam. Will treat pain and discharge.

## 2018-07-18 ENCOUNTER — EMERGENCY (EMERGENCY)
Facility: HOSPITAL | Age: 73
LOS: 0 days | Discharge: HOME | End: 2018-07-18
Attending: EMERGENCY MEDICINE | Admitting: EMERGENCY MEDICINE

## 2018-07-18 VITALS
RESPIRATION RATE: 20 BRPM | TEMPERATURE: 98 F | DIASTOLIC BLOOD PRESSURE: 61 MMHG | SYSTOLIC BLOOD PRESSURE: 134 MMHG | OXYGEN SATURATION: 99 % | WEIGHT: 119.05 LBS | HEART RATE: 71 BPM

## 2018-07-18 DIAGNOSIS — F17.200 NICOTINE DEPENDENCE, UNSPECIFIED, UNCOMPLICATED: ICD-10-CM

## 2018-07-18 DIAGNOSIS — Z79.899 OTHER LONG TERM (CURRENT) DRUG THERAPY: ICD-10-CM

## 2018-07-18 DIAGNOSIS — M25.512 PAIN IN LEFT SHOULDER: ICD-10-CM

## 2018-07-18 DIAGNOSIS — R07.9 CHEST PAIN, UNSPECIFIED: ICD-10-CM

## 2018-07-18 DIAGNOSIS — J44.9 CHRONIC OBSTRUCTIVE PULMONARY DISEASE, UNSPECIFIED: ICD-10-CM

## 2018-07-18 DIAGNOSIS — M79.602 PAIN IN LEFT ARM: ICD-10-CM

## 2018-07-18 DIAGNOSIS — R07.89 OTHER CHEST PAIN: ICD-10-CM

## 2018-07-18 DIAGNOSIS — F25.1 SCHIZOAFFECTIVE DISORDER, DEPRESSIVE TYPE: ICD-10-CM

## 2018-07-18 DIAGNOSIS — Z86.73 PERSONAL HISTORY OF TRANSIENT ISCHEMIC ATTACK (TIA), AND CEREBRAL INFARCTION WITHOUT RESIDUAL DEFICITS: ICD-10-CM

## 2018-07-18 DIAGNOSIS — E78.5 HYPERLIPIDEMIA, UNSPECIFIED: ICD-10-CM

## 2018-07-18 DIAGNOSIS — Z88.8 ALLERGY STATUS TO OTHER DRUGS, MEDICAMENTS AND BIOLOGICAL SUBSTANCES STATUS: ICD-10-CM

## 2018-07-18 DIAGNOSIS — Z88.0 ALLERGY STATUS TO PENICILLIN: ICD-10-CM

## 2018-07-18 DIAGNOSIS — Z79.82 LONG TERM (CURRENT) USE OF ASPIRIN: ICD-10-CM

## 2018-07-18 PROBLEM — I63.9 CEREBRAL INFARCTION, UNSPECIFIED: Chronic | Status: ACTIVE | Noted: 2018-02-05

## 2018-07-18 PROBLEM — D64.9 ANEMIA, UNSPECIFIED: Chronic | Status: ACTIVE | Noted: 2018-02-05

## 2018-07-18 PROBLEM — F44.5 CONVERSION DISORDER WITH SEIZURES OR CONVULSIONS: Chronic | Status: ACTIVE | Noted: 2018-02-05

## 2018-07-18 PROBLEM — R42 DIZZINESS AND GIDDINESS: Chronic | Status: ACTIVE | Noted: 2018-02-05

## 2018-07-18 PROBLEM — H40.9 UNSPECIFIED GLAUCOMA: Chronic | Status: ACTIVE | Noted: 2018-02-05

## 2018-07-18 RX ORDER — KETOROLAC TROMETHAMINE 30 MG/ML
30 SYRINGE (ML) INJECTION ONCE
Qty: 0 | Refills: 0 | Status: DISCONTINUED | OUTPATIENT
Start: 2018-07-18 | End: 2018-07-18

## 2018-07-18 RX ORDER — METHOCARBAMOL 500 MG/1
1000 TABLET, FILM COATED ORAL ONCE
Qty: 0 | Refills: 0 | Status: COMPLETED | OUTPATIENT
Start: 2018-07-18 | End: 2018-07-18

## 2018-07-18 RX ORDER — IBUPROFEN 200 MG
1 TABLET ORAL
Qty: 30 | Refills: 0 | OUTPATIENT
Start: 2018-07-18

## 2018-07-18 RX ORDER — METHOCARBAMOL 500 MG/1
1 TABLET, FILM COATED ORAL
Qty: 30 | Refills: 0 | OUTPATIENT
Start: 2018-07-18 | End: 2018-07-27

## 2018-07-18 RX ADMIN — METHOCARBAMOL 1000 MILLIGRAM(S): 500 TABLET, FILM COATED ORAL at 05:27

## 2018-07-18 RX ADMIN — Medication 30 MILLIGRAM(S): at 05:42

## 2018-07-18 RX ADMIN — Medication 30 MILLIGRAM(S): at 05:27

## 2018-07-18 NOTE — ED ADULT NURSE NOTE - OBJECTIVE STATEMENT
pt c/o CP , left arm pain, and neck pain x few hours. Pt states pain is worse with movement. Denies injury to arm.

## 2018-07-18 NOTE — ED PROVIDER NOTE - NS ED ROS FT
Constitutional: no fever, chills, no recent weight loss, change in appetite or malaise  Eyes: no redness/discharge/pain/vision changes  ENT: no rhinorrhea/ear pain/sore throat  Cardiac: No chest pain, SOB or edema.  Respiratory: No cough or respiratory distress  GI: No nausea, vomiting, diarrhea or abdominal pain.  : No dysuria, frequency, urgency or hematuria  MS: see HPI  Neuro: No headache or weakness. No LOC.  Skin: No skin rash.  Endocrine: No history of thyroid disease or diabetes.  Except as documented in the HPI, all other systems are negative.

## 2018-07-18 NOTE — ED ADULT TRIAGE NOTE - AS HEIGHT TYPE
Alert-The patient is alert, awake and responds to voice. The patient is oriented to time, place, and person. The triage nurse is able to obtain subjective information.
stated

## 2018-07-18 NOTE — ED PROVIDER NOTE - PHYSICAL EXAMINATION
CONSTITUTIONAL: Well-appearing; well-nourished; in no apparent distress. .   NECK: Supple; non-tender; no cervical lymphadenopathy. No JVD.   CARDIOVASCULAR: Normal S1, S2; no murmurs, rubs, or gallops.   RESPIRATORY: Normal chest excursion with respiration; breath sounds clear and equal bilaterally; no wheezes, rhonchi, or rales.  GI/: Normal bowel sounds; non-distended; non-tender; no palpable organomegaly.   MS: + left side trapezius muscle tenderness/left upper arm deltoid muscle and left lateral chest wall muscle tenderness. left shoulder with limited ROM 2/2 pain. left arm pain reproducible with left shoulder movement. 2+ radial pulses. left elbow/hand with FROM. normal cap refill and sensation intact. left hand n/v intact.   SKIN: Normal for age and race; warm; dry; good turgor; no apparent lesions or exudate.   NEURO/PSYCH: A & O x 4; grossly unremarkable. mood and manner are appropriate. Grooming and personal hygiene are appropriate. No apparent thoughts of harm to self or others.

## 2018-07-18 NOTE — ED PROVIDER NOTE - OBJECTIVE STATEMENT
72 yo female hx of Anemia/COPD/CVA/HLD/Pseudoseizure/schizoaffective and Chronic vertigo present c/o left shoulder/arm and chest pain started few hours PTA. denies injury and heavy lifting. pain to left arm/shoulder worsen with movement. pain to chest only to the side of left chest and axilla and also worsen without movement and palpation. improve with resting. denies  weakness/nausea/lightheadedness/diaphoresis assoc with the pain.   Denies fever/chill/HA/dizziness/palpitation/sob/abd pain/n/v/d/ black stool/bloody stool/urinary sxs

## 2018-07-18 NOTE — ED PROVIDER NOTE - ATTENDING CONTRIBUTION TO CARE
73F from [facility] h/o CVA, pseudoseizures, schizoaffective d/o, chronic vertigo, copd, hl, anemia p/w L shoulder & CP starting sev hrs pta. Denies trauma. Described as L shoulder & upper arm pain worse w/mvmt, accomp by pain to L upper chest & axilla also worse w/mvmt and palpation.  Denies diaphoresis, sob, palpitations, cough, hemoptysis, nv, abd pain, edema, FNDs. PE: elderly f WA nad, ncat, neck supple no jvd, rrr nl s1s2 no mrg, ctab no wrr, abd soft ntnd, +tenderness to L shoulder/CHRISTOPHER chest wall & axilla, no signs of trauma, full rom/strength/sensation throughout remainder of LUE, dpi, cr<2s, remainder of ext exam nl, dpi. a/p: SEE MDM

## 2018-07-25 ENCOUNTER — EMERGENCY (EMERGENCY)
Facility: HOSPITAL | Age: 73
LOS: 0 days | Discharge: NURSING, REHAB OR LONG-TERM CARE | End: 2018-07-25
Attending: EMERGENCY MEDICINE | Admitting: EMERGENCY MEDICINE

## 2018-07-25 VITALS
TEMPERATURE: 98 F | SYSTOLIC BLOOD PRESSURE: 124 MMHG | HEIGHT: 62 IN | RESPIRATION RATE: 18 BRPM | OXYGEN SATURATION: 97 % | HEART RATE: 50 BPM | WEIGHT: 113.1 LBS | DIASTOLIC BLOOD PRESSURE: 96 MMHG

## 2018-07-25 VITALS — SYSTOLIC BLOOD PRESSURE: 127 MMHG | HEART RATE: 61 BPM | DIASTOLIC BLOOD PRESSURE: 76 MMHG

## 2018-07-25 DIAGNOSIS — Z86.73 PERSONAL HISTORY OF TRANSIENT ISCHEMIC ATTACK (TIA), AND CEREBRAL INFARCTION WITHOUT RESIDUAL DEFICITS: ICD-10-CM

## 2018-07-25 DIAGNOSIS — Z88.0 ALLERGY STATUS TO PENICILLIN: ICD-10-CM

## 2018-07-25 DIAGNOSIS — M25.562 PAIN IN LEFT KNEE: ICD-10-CM

## 2018-07-25 DIAGNOSIS — J44.9 CHRONIC OBSTRUCTIVE PULMONARY DISEASE, UNSPECIFIED: ICD-10-CM

## 2018-07-25 DIAGNOSIS — Z88.8 ALLERGY STATUS TO OTHER DRUGS, MEDICAMENTS AND BIOLOGICAL SUBSTANCES STATUS: ICD-10-CM

## 2018-07-25 DIAGNOSIS — E78.5 HYPERLIPIDEMIA, UNSPECIFIED: ICD-10-CM

## 2018-07-25 DIAGNOSIS — G89.29 OTHER CHRONIC PAIN: ICD-10-CM

## 2018-07-25 DIAGNOSIS — Z79.899 OTHER LONG TERM (CURRENT) DRUG THERAPY: ICD-10-CM

## 2018-07-25 DIAGNOSIS — Z79.02 LONG TERM (CURRENT) USE OF ANTITHROMBOTICS/ANTIPLATELETS: ICD-10-CM

## 2018-07-25 DIAGNOSIS — Z79.82 LONG TERM (CURRENT) USE OF ASPIRIN: ICD-10-CM

## 2018-07-25 RX ORDER — ACETAMINOPHEN 500 MG
650 TABLET ORAL ONCE
Qty: 0 | Refills: 0 | Status: COMPLETED | OUTPATIENT
Start: 2018-07-25 | End: 2018-07-25

## 2018-07-25 RX ORDER — FAMOTIDINE 10 MG/ML
1 INJECTION INTRAVENOUS
Qty: 0 | Refills: 0 | COMMUNITY

## 2018-07-25 RX ORDER — MECLIZINE HCL 12.5 MG
1 TABLET ORAL
Qty: 0 | Refills: 0 | COMMUNITY

## 2018-07-25 RX ORDER — LATANOPROST 0.05 MG/ML
1 SOLUTION/ DROPS OPHTHALMIC; TOPICAL
Qty: 0 | Refills: 0 | COMMUNITY

## 2018-07-25 RX ADMIN — Medication 650 MILLIGRAM(S): at 09:30

## 2018-07-25 NOTE — ED PROVIDER NOTE - ATTENDING CONTRIBUTION TO CARE
Pulses equal bilaterally, no edema present. I personally evaluated the patient. I reviewed the Resident’s or Physician Assistant’s note (as assigned above), and agree with the findings and plan except as documented in my note. Pt brought in from [facility] c/o left knee pain that has been chronic. Original injury was a fall approx 1 month prior No fevers, no chills, no redness. Complaining of sudden onset worsening today. Able to ambulate. On exam: NCAT. PERRLA, EOMI. OP clear. Lungs CTAB. RRR, S1S2 noted. Radial pulses 2+ and equal, pedal pulses 2+ and equal. Abdomen soft, NT/ND, no rebound or guarding. FROM x4 extremities. No focal neuro deficits. No swelling, no redness. Normal rom. Petal pulse is normal. Plan is PO pain meds. Xray. Likely dc.

## 2018-07-25 NOTE — ED PROVIDER NOTE - OBJECTIVE STATEMENT
hx from patient and medical assistant Deedee from facility  74 yo female c/o left knee pain for a long time, pain increased today. Patient denies trauma or fall. Per staff via phone, patient has chronic left knee pain that she has seen ortho for already.

## 2018-07-25 NOTE — ED PROVIDER NOTE - NS ED ROS FT
Constitutional: (-) fever  Skin: (-) rash  Muskuloskeletal: (+) left knee pain  Neurological: (-) altered mental status

## 2018-07-25 NOTE — ED PROVIDER NOTE - PHYSICAL EXAMINATION
Physical Exam    Vital Signs: I have reviewed the initial vital signs.  Constitutional: well-nourished, appears stated age, no acute distress  Skin: warm, dry. No ecchymosis  Muskuloskeletal: Left knee: +tender to palpation to left knee and left ankle. No swelling, erythema or ecchymosis. ROM intact to left leg including hip and knee. n/v intact. +surgical scar to left ankle.  Neuro: AOx3, Motor 5/5, Sensation: equal and intact

## 2018-07-25 NOTE — ED PROVIDER NOTE - MEDICAL DECISION MAKING DETAILS
I personally evaluated the patient. I reviewed the Resident’s or Physician Assistant’s note (as assigned above), and agree with the findings and plan except as documented in my note. Pt brought in from [facility] c/o left knee pain that has been chronic. Original injury was a fall. No fevers, no chills, no redness. Complaining of sudden onset worsening today. Able to ambulate. On exam: NCAT. PERRLA, EOMI. OP clear. Lungs CTAB. RRR, S1S2 noted. Radial pulses 2+ and equal, pedal pulses 2+ and equal. Abdomen soft, NT/ND, no rebound or guarding. FROM x4 extremities. No focal neuro deficits. No swelling, no redness. Normal rom. Petal pulse is normal. Plan is PO pain meds. Xray. Likely dc. I personally evaluated the patient. I reviewed the Resident’s or Physician Assistant’s note (as assigned above), and agree with the findings and plan except as documented in my note. Pt brought in from [facility] c/o left knee pain that has been chronic. Original injury was a fall approx 1 month prior No fevers, no chills, no redness. Complaining of sudden onset worsening today. Able to ambulate. On exam: NCAT. PERRLA, EOMI. OP clear. Lungs CTAB. RRR, S1S2 noted. Radial pulses 2+ and equal, pedal pulses 2+ and equal. Abdomen soft, NT/ND, no rebound or guarding. FROM x4 extremities. No focal neuro deficits. No swelling, no redness. Normal rom. Petal pulse is normal. Plan is PO pain meds. Xray. Likely dc.

## 2018-07-26 ENCOUNTER — EMERGENCY (EMERGENCY)
Facility: HOSPITAL | Age: 73
LOS: 0 days | Discharge: HOME | End: 2018-07-26
Attending: EMERGENCY MEDICINE | Admitting: EMERGENCY MEDICINE

## 2018-07-26 VITALS
HEART RATE: 60 BPM | RESPIRATION RATE: 20 BRPM | TEMPERATURE: 96 F | OXYGEN SATURATION: 98 % | DIASTOLIC BLOOD PRESSURE: 65 MMHG | SYSTOLIC BLOOD PRESSURE: 140 MMHG

## 2018-07-26 VITALS
DIASTOLIC BLOOD PRESSURE: 63 MMHG | OXYGEN SATURATION: 97 % | RESPIRATION RATE: 20 BRPM | TEMPERATURE: 98 F | SYSTOLIC BLOOD PRESSURE: 140 MMHG | HEART RATE: 58 BPM

## 2018-07-26 DIAGNOSIS — J44.9 CHRONIC OBSTRUCTIVE PULMONARY DISEASE, UNSPECIFIED: ICD-10-CM

## 2018-07-26 DIAGNOSIS — Z79.899 OTHER LONG TERM (CURRENT) DRUG THERAPY: ICD-10-CM

## 2018-07-26 DIAGNOSIS — Z88.0 ALLERGY STATUS TO PENICILLIN: ICD-10-CM

## 2018-07-26 DIAGNOSIS — R10.13 EPIGASTRIC PAIN: ICD-10-CM

## 2018-07-26 DIAGNOSIS — K21.9 GASTRO-ESOPHAGEAL REFLUX DISEASE WITHOUT ESOPHAGITIS: ICD-10-CM

## 2018-07-26 DIAGNOSIS — E78.5 HYPERLIPIDEMIA, UNSPECIFIED: ICD-10-CM

## 2018-07-26 DIAGNOSIS — Z79.82 LONG TERM (CURRENT) USE OF ASPIRIN: ICD-10-CM

## 2018-07-26 DIAGNOSIS — Z79.02 LONG TERM (CURRENT) USE OF ANTITHROMBOTICS/ANTIPLATELETS: ICD-10-CM

## 2018-07-26 DIAGNOSIS — F17.200 NICOTINE DEPENDENCE, UNSPECIFIED, UNCOMPLICATED: ICD-10-CM

## 2018-07-26 DIAGNOSIS — Z88.8 ALLERGY STATUS TO OTHER DRUGS, MEDICAMENTS AND BIOLOGICAL SUBSTANCES STATUS: ICD-10-CM

## 2018-07-26 LAB
ALBUMIN SERPL ELPH-MCNC: 3.8 G/DL — SIGNIFICANT CHANGE UP (ref 3.5–5.2)
ALP SERPL-CCNC: 71 U/L — SIGNIFICANT CHANGE UP (ref 30–115)
ALT FLD-CCNC: 6 U/L — SIGNIFICANT CHANGE UP (ref 0–41)
ANION GAP SERPL CALC-SCNC: 13 MMOL/L — SIGNIFICANT CHANGE UP (ref 7–14)
APTT BLD: 31.6 SEC — SIGNIFICANT CHANGE UP (ref 27–39.2)
AST SERPL-CCNC: 14 U/L — SIGNIFICANT CHANGE UP (ref 0–41)
BASOPHILS # BLD AUTO: 0.02 K/UL — SIGNIFICANT CHANGE UP (ref 0–0.2)
BASOPHILS NFR BLD AUTO: 0.4 % — SIGNIFICANT CHANGE UP (ref 0–1)
BILIRUB SERPL-MCNC: 0.2 MG/DL — SIGNIFICANT CHANGE UP (ref 0.2–1.2)
BUN SERPL-MCNC: 21 MG/DL — HIGH (ref 10–20)
CALCIUM SERPL-MCNC: 9.2 MG/DL — SIGNIFICANT CHANGE UP (ref 8.5–10.1)
CHLORIDE SERPL-SCNC: 103 MMOL/L — SIGNIFICANT CHANGE UP (ref 98–110)
CK MB CFR SERPL CALC: 1.5 NG/ML — SIGNIFICANT CHANGE UP (ref 0.6–6.3)
CK SERPL-CCNC: 70 U/L — SIGNIFICANT CHANGE UP (ref 0–225)
CO2 SERPL-SCNC: 27 MMOL/L — SIGNIFICANT CHANGE UP (ref 17–32)
CREAT SERPL-MCNC: 0.9 MG/DL — SIGNIFICANT CHANGE UP (ref 0.7–1.5)
EOSINOPHIL # BLD AUTO: 0.12 K/UL — SIGNIFICANT CHANGE UP (ref 0–0.7)
EOSINOPHIL NFR BLD AUTO: 2.6 % — SIGNIFICANT CHANGE UP (ref 0–8)
GLUCOSE SERPL-MCNC: 89 MG/DL — SIGNIFICANT CHANGE UP (ref 70–99)
HCT VFR BLD CALC: 39.1 % — SIGNIFICANT CHANGE UP (ref 37–47)
HGB BLD-MCNC: 13.3 G/DL — SIGNIFICANT CHANGE UP (ref 12–16)
IMM GRANULOCYTES NFR BLD AUTO: 0.2 % — SIGNIFICANT CHANGE UP (ref 0.1–0.3)
INR BLD: 1.13 RATIO — SIGNIFICANT CHANGE UP (ref 0.65–1.3)
LACTATE SERPL-SCNC: 0.9 MMOL/L — SIGNIFICANT CHANGE UP (ref 0.5–2.2)
LIDOCAIN IGE QN: 38 U/L — SIGNIFICANT CHANGE UP (ref 7–60)
LYMPHOCYTES # BLD AUTO: 1.67 K/UL — SIGNIFICANT CHANGE UP (ref 1.2–3.4)
LYMPHOCYTES # BLD AUTO: 36.3 % — SIGNIFICANT CHANGE UP (ref 20.5–51.1)
MAGNESIUM SERPL-MCNC: 1.9 MG/DL — SIGNIFICANT CHANGE UP (ref 1.8–2.4)
MCHC RBC-ENTMCNC: 30.1 PG — SIGNIFICANT CHANGE UP (ref 27–31)
MCHC RBC-ENTMCNC: 34 G/DL — SIGNIFICANT CHANGE UP (ref 32–37)
MCV RBC AUTO: 88.5 FL — SIGNIFICANT CHANGE UP (ref 81–99)
MONOCYTES # BLD AUTO: 0.44 K/UL — SIGNIFICANT CHANGE UP (ref 0.1–0.6)
MONOCYTES NFR BLD AUTO: 9.6 % — HIGH (ref 1.7–9.3)
NEUTROPHILS # BLD AUTO: 2.34 K/UL — SIGNIFICANT CHANGE UP (ref 1.4–6.5)
NEUTROPHILS NFR BLD AUTO: 50.9 % — SIGNIFICANT CHANGE UP (ref 42.2–75.2)
NRBC # BLD: 0 /100 WBCS — SIGNIFICANT CHANGE UP (ref 0–0)
PLATELET # BLD AUTO: 117 K/UL — LOW (ref 130–400)
POTASSIUM SERPL-MCNC: 4.4 MMOL/L — SIGNIFICANT CHANGE UP (ref 3.5–5)
POTASSIUM SERPL-SCNC: 4.4 MMOL/L — SIGNIFICANT CHANGE UP (ref 3.5–5)
PROT SERPL-MCNC: 6.8 G/DL — SIGNIFICANT CHANGE UP (ref 6–8)
PROTHROM AB SERPL-ACNC: 12.2 SEC — SIGNIFICANT CHANGE UP (ref 9.95–12.87)
RBC # BLD: 4.42 M/UL — SIGNIFICANT CHANGE UP (ref 4.2–5.4)
RBC # FLD: 14.6 % — HIGH (ref 11.5–14.5)
SODIUM SERPL-SCNC: 143 MMOL/L — SIGNIFICANT CHANGE UP (ref 135–146)
TROPONIN T SERPL-MCNC: <0.01 NG/ML — SIGNIFICANT CHANGE UP
WBC # BLD: 4.6 K/UL — LOW (ref 4.8–10.8)
WBC # FLD AUTO: 4.6 K/UL — LOW (ref 4.8–10.8)

## 2018-07-26 RX ORDER — FAMOTIDINE 10 MG/ML
1 INJECTION INTRAVENOUS
Qty: 60 | Refills: 0
Start: 2018-07-26 | End: 2018-08-24

## 2018-07-26 RX ORDER — FAMOTIDINE 10 MG/ML
40 INJECTION INTRAVENOUS ONCE
Qty: 0 | Refills: 0 | Status: COMPLETED | OUTPATIENT
Start: 2018-07-26 | End: 2018-07-26

## 2018-07-26 RX ORDER — PANTOPRAZOLE SODIUM 20 MG/1
40 TABLET, DELAYED RELEASE ORAL ONCE
Qty: 0 | Refills: 0 | Status: COMPLETED | OUTPATIENT
Start: 2018-07-26 | End: 2018-07-26

## 2018-07-26 RX ORDER — SODIUM CHLORIDE 9 MG/ML
1000 INJECTION INTRAMUSCULAR; INTRAVENOUS; SUBCUTANEOUS
Qty: 0 | Refills: 0 | Status: DISCONTINUED | OUTPATIENT
Start: 2018-07-26 | End: 2018-07-26

## 2018-07-26 RX ORDER — ONDANSETRON 8 MG/1
4 TABLET, FILM COATED ORAL ONCE
Qty: 0 | Refills: 0 | Status: COMPLETED | OUTPATIENT
Start: 2018-07-26 | End: 2018-07-26

## 2018-07-26 RX ORDER — METOCLOPRAMIDE HCL 10 MG
10 TABLET ORAL ONCE
Qty: 0 | Refills: 0 | Status: COMPLETED | OUTPATIENT
Start: 2018-07-26 | End: 2018-07-26

## 2018-07-26 RX ADMIN — SODIUM CHLORIDE 200 MILLILITER(S): 9 INJECTION INTRAMUSCULAR; INTRAVENOUS; SUBCUTANEOUS at 06:55

## 2018-07-26 RX ADMIN — ONDANSETRON 4 MILLIGRAM(S): 8 TABLET, FILM COATED ORAL at 05:14

## 2018-07-26 RX ADMIN — Medication 10 MILLIGRAM(S): at 07:17

## 2018-07-26 RX ADMIN — PANTOPRAZOLE SODIUM 40 MILLIGRAM(S): 20 TABLET, DELAYED RELEASE ORAL at 06:55

## 2018-07-26 RX ADMIN — FAMOTIDINE 40 MILLIGRAM(S): 10 INJECTION INTRAVENOUS at 03:40

## 2018-07-26 RX ADMIN — Medication 30 MILLILITER(S): at 03:40

## 2018-07-26 NOTE — ED PROVIDER NOTE - PROGRESS NOTE DETAILS
Pt c/o some dry heaves.  Requesting zofran. still complains of nausea. will do labs work and give IV fluid s/o to Dr. Zavala. received s/o from dr toscano. epig discomfort/nausea. nontoxic. feels better. abd soft, epig tenderness. hx of severe GERD. was in [facility] 3 wks ago for same. ate food. no vomiting. feels great. wants to go home. abd soft, walking around

## 2018-07-26 NOTE — ED ADULT NURSE NOTE - CHPI ED SYMPTOMS NEG
no abdominal distension/no blood in stool/no diarrhea/no hematuria/no chills/no dysuria/no fever/no burning urination

## 2018-07-26 NOTE — ED PROVIDER NOTE - NS ED ROS FT
Constitutional: no fever, chills, no recent weight loss, change in appetite or malaise  Eyes: no redness/discharge/pain/vision changes  ENT: no rhinorrhea/ear pain/sore throat  Cardiac: No chest pain, SOB or edema.  Respiratory: No cough or respiratory distress  GI: see HPI  : No dysuria, frequency, urgency or hematuria  MS: no pain to back or extremities, no loss of ROM, no weakness  Neuro: No headache or weakness. No LOC.  Skin: No skin rash.  Endocrine: No history of thyroid disease or diabetes.  Except as documented in the HPI, all other systems are negative.

## 2018-07-26 NOTE — ED PROVIDER NOTE - ATTENDING CONTRIBUTION TO CARE
74 y/o F with epigastric pain and nausea.  Emesis x 1. 74 y/o F with epigastric pain and nausea.  Pt states that her acid reflux is acting up.  Pt has been taking more motrin recently for her joint pains.  Emesis x 3 just prior to arrival.  No diarrhea.  No fever.  No cp, sob.  EXAM: well appearing. NAD. s1s2, reg. CTAB. abd soft, nd, mild epigastric ttp, but no guarding or rebound.  No CVAT.  P: Gi meds, and nausea control.  Will reassess.

## 2018-08-09 ENCOUNTER — OUTPATIENT (OUTPATIENT)
Dept: OUTPATIENT SERVICES | Facility: HOSPITAL | Age: 73
LOS: 1 days | Discharge: HOME | End: 2018-08-09

## 2018-08-09 DIAGNOSIS — R79.89 OTHER SPECIFIED ABNORMAL FINDINGS OF BLOOD CHEMISTRY: ICD-10-CM

## 2018-08-23 ENCOUNTER — OUTPATIENT (OUTPATIENT)
Dept: OUTPATIENT SERVICES | Facility: HOSPITAL | Age: 73
LOS: 1 days | Discharge: HOME | End: 2018-08-23

## 2018-08-23 DIAGNOSIS — G40.89 OTHER SEIZURES: ICD-10-CM

## 2018-09-19 ENCOUNTER — OUTPATIENT (OUTPATIENT)
Dept: OUTPATIENT SERVICES | Facility: HOSPITAL | Age: 73
LOS: 1 days | Discharge: HOME | End: 2018-09-19

## 2018-09-19 DIAGNOSIS — I25.10 ATHEROSCLEROTIC HEART DISEASE OF NATIVE CORONARY ARTERY WITHOUT ANGINA PECTORIS: ICD-10-CM

## 2018-10-05 ENCOUNTER — EMERGENCY (EMERGENCY)
Facility: HOSPITAL | Age: 73
LOS: 1 days | Discharge: HOME | End: 2018-10-05
Attending: STUDENT IN AN ORGANIZED HEALTH CARE EDUCATION/TRAINING PROGRAM

## 2018-10-05 VITALS
TEMPERATURE: 99 F | SYSTOLIC BLOOD PRESSURE: 166 MMHG | RESPIRATION RATE: 18 BRPM | HEIGHT: 62 IN | HEART RATE: 90 BPM | DIASTOLIC BLOOD PRESSURE: 72 MMHG | OXYGEN SATURATION: 99 % | WEIGHT: 117.95 LBS

## 2018-10-05 DIAGNOSIS — R11.2 NAUSEA WITH VOMITING, UNSPECIFIED: ICD-10-CM

## 2018-10-05 DIAGNOSIS — Z86.73 PERSONAL HISTORY OF TRANSIENT ISCHEMIC ATTACK (TIA), AND CEREBRAL INFARCTION WITHOUT RESIDUAL DEFICITS: ICD-10-CM

## 2018-10-05 DIAGNOSIS — Z79.02 LONG TERM (CURRENT) USE OF ANTITHROMBOTICS/ANTIPLATELETS: ICD-10-CM

## 2018-10-05 DIAGNOSIS — R10.13 EPIGASTRIC PAIN: ICD-10-CM

## 2018-10-05 DIAGNOSIS — Z88.0 ALLERGY STATUS TO PENICILLIN: ICD-10-CM

## 2018-10-05 DIAGNOSIS — J44.9 CHRONIC OBSTRUCTIVE PULMONARY DISEASE, UNSPECIFIED: ICD-10-CM

## 2018-10-05 DIAGNOSIS — Z79.82 LONG TERM (CURRENT) USE OF ASPIRIN: ICD-10-CM

## 2018-10-05 DIAGNOSIS — Z87.891 PERSONAL HISTORY OF NICOTINE DEPENDENCE: ICD-10-CM

## 2018-10-05 DIAGNOSIS — E78.5 HYPERLIPIDEMIA, UNSPECIFIED: ICD-10-CM

## 2018-10-05 DIAGNOSIS — Z79.1 LONG TERM (CURRENT) USE OF NON-STEROIDAL ANTI-INFLAMMATORIES (NSAID): ICD-10-CM

## 2018-10-05 DIAGNOSIS — Z79.899 OTHER LONG TERM (CURRENT) DRUG THERAPY: ICD-10-CM

## 2018-10-05 DIAGNOSIS — F25.9 SCHIZOAFFECTIVE DISORDER, UNSPECIFIED: ICD-10-CM

## 2018-10-05 RX ORDER — ONDANSETRON 8 MG/1
4 TABLET, FILM COATED ORAL ONCE
Qty: 0 | Refills: 0 | Status: COMPLETED | OUTPATIENT
Start: 2018-10-05 | End: 2018-10-05

## 2018-10-05 RX ORDER — LIDOCAINE 4 G/100G
5 CREAM TOPICAL ONCE
Qty: 0 | Refills: 0 | Status: COMPLETED | OUTPATIENT
Start: 2018-10-05 | End: 2018-10-05

## 2018-10-05 RX ORDER — SODIUM CHLORIDE 9 MG/ML
1000 INJECTION INTRAMUSCULAR; INTRAVENOUS; SUBCUTANEOUS ONCE
Qty: 0 | Refills: 0 | Status: COMPLETED | OUTPATIENT
Start: 2018-10-05 | End: 2018-10-05

## 2018-10-05 RX ORDER — FAMOTIDINE 10 MG/ML
20 INJECTION INTRAVENOUS ONCE
Qty: 0 | Refills: 0 | Status: COMPLETED | OUTPATIENT
Start: 2018-10-05 | End: 2018-10-05

## 2018-10-05 RX ADMIN — FAMOTIDINE 20 MILLIGRAM(S): 10 INJECTION INTRAVENOUS at 22:30

## 2018-10-06 LAB
ALBUMIN SERPL ELPH-MCNC: 4 G/DL — SIGNIFICANT CHANGE UP (ref 3.5–5.2)
ALP SERPL-CCNC: 64 U/L — SIGNIFICANT CHANGE UP (ref 30–115)
ALT FLD-CCNC: 10 U/L — SIGNIFICANT CHANGE UP (ref 0–41)
ANION GAP SERPL CALC-SCNC: 12 MMOL/L — SIGNIFICANT CHANGE UP (ref 7–14)
APPEARANCE UR: CLEAR — SIGNIFICANT CHANGE UP
AST SERPL-CCNC: 21 U/L — SIGNIFICANT CHANGE UP (ref 0–41)
BASOPHILS # BLD AUTO: 0 K/UL — SIGNIFICANT CHANGE UP (ref 0–0.2)
BASOPHILS NFR BLD AUTO: 0 % — SIGNIFICANT CHANGE UP (ref 0–1)
BILIRUB SERPL-MCNC: 0.2 MG/DL — SIGNIFICANT CHANGE UP (ref 0.2–1.2)
BILIRUB UR-MCNC: NEGATIVE — SIGNIFICANT CHANGE UP
BUN SERPL-MCNC: 24 MG/DL — HIGH (ref 10–20)
CALCIUM SERPL-MCNC: 9.2 MG/DL — SIGNIFICANT CHANGE UP (ref 8.5–10.1)
CHLORIDE SERPL-SCNC: 104 MMOL/L — SIGNIFICANT CHANGE UP (ref 98–110)
CO2 SERPL-SCNC: 27 MMOL/L — SIGNIFICANT CHANGE UP (ref 17–32)
COLOR SPEC: YELLOW — SIGNIFICANT CHANGE UP
CREAT SERPL-MCNC: 0.8 MG/DL — SIGNIFICANT CHANGE UP (ref 0.7–1.5)
DIFF PNL FLD: NEGATIVE — SIGNIFICANT CHANGE UP
EOSINOPHIL # BLD AUTO: 0.08 K/UL — SIGNIFICANT CHANGE UP (ref 0–0.7)
EOSINOPHIL NFR BLD AUTO: 1.6 % — SIGNIFICANT CHANGE UP (ref 0–8)
EPI CELLS # UR: ABNORMAL /HPF
GLUCOSE SERPL-MCNC: 90 MG/DL — SIGNIFICANT CHANGE UP (ref 70–99)
GLUCOSE UR QL: NEGATIVE MG/DL — SIGNIFICANT CHANGE UP
HCT VFR BLD CALC: 35.3 % — LOW (ref 37–47)
HGB BLD-MCNC: 11.5 G/DL — LOW (ref 12–16)
IMM GRANULOCYTES NFR BLD AUTO: 0.2 % — SIGNIFICANT CHANGE UP (ref 0.1–0.3)
KETONES UR-MCNC: NEGATIVE — SIGNIFICANT CHANGE UP
LEUKOCYTE ESTERASE UR-ACNC: ABNORMAL
LIDOCAIN IGE QN: 41 U/L — SIGNIFICANT CHANGE UP (ref 7–60)
LYMPHOCYTES # BLD AUTO: 1.6 K/UL — SIGNIFICANT CHANGE UP (ref 1.2–3.4)
LYMPHOCYTES # BLD AUTO: 31.3 % — SIGNIFICANT CHANGE UP (ref 20.5–51.1)
MCHC RBC-ENTMCNC: 30.1 PG — SIGNIFICANT CHANGE UP (ref 27–31)
MCHC RBC-ENTMCNC: 32.6 G/DL — SIGNIFICANT CHANGE UP (ref 32–37)
MCV RBC AUTO: 92.4 FL — SIGNIFICANT CHANGE UP (ref 81–99)
MONOCYTES # BLD AUTO: 0.67 K/UL — HIGH (ref 0.1–0.6)
MONOCYTES NFR BLD AUTO: 13.1 % — HIGH (ref 1.7–9.3)
NEUTROPHILS # BLD AUTO: 2.75 K/UL — SIGNIFICANT CHANGE UP (ref 1.4–6.5)
NEUTROPHILS NFR BLD AUTO: 53.8 % — SIGNIFICANT CHANGE UP (ref 42.2–75.2)
NITRITE UR-MCNC: NEGATIVE — SIGNIFICANT CHANGE UP
NRBC # BLD: 0 /100 WBCS — SIGNIFICANT CHANGE UP (ref 0–0)
PH UR: 7 — SIGNIFICANT CHANGE UP (ref 5–8)
PLATELET # BLD AUTO: 91 K/UL — LOW (ref 130–400)
POTASSIUM SERPL-MCNC: 4.2 MMOL/L — SIGNIFICANT CHANGE UP (ref 3.5–5)
POTASSIUM SERPL-SCNC: 4.2 MMOL/L — SIGNIFICANT CHANGE UP (ref 3.5–5)
PROT SERPL-MCNC: 6.6 G/DL — SIGNIFICANT CHANGE UP (ref 6–8)
PROT UR-MCNC: NEGATIVE MG/DL — SIGNIFICANT CHANGE UP
RBC # BLD: 3.82 M/UL — LOW (ref 4.2–5.4)
RBC # FLD: 16.6 % — HIGH (ref 11.5–14.5)
RBC CASTS # UR COMP ASSIST: SIGNIFICANT CHANGE UP /HPF
SODIUM SERPL-SCNC: 143 MMOL/L — SIGNIFICANT CHANGE UP (ref 135–146)
SP GR SPEC: 1.02 — SIGNIFICANT CHANGE UP (ref 1.01–1.03)
UROBILINOGEN FLD QL: 0.2 MG/DL — SIGNIFICANT CHANGE UP (ref 0.2–0.2)
WBC # BLD: 5.11 K/UL — SIGNIFICANT CHANGE UP (ref 4.8–10.8)
WBC # FLD AUTO: 5.11 K/UL — SIGNIFICANT CHANGE UP (ref 4.8–10.8)
WBC UR QL: ABNORMAL /HPF

## 2018-10-06 RX ADMIN — SODIUM CHLORIDE 2000 MILLILITER(S): 9 INJECTION INTRAMUSCULAR; INTRAVENOUS; SUBCUTANEOUS at 02:21

## 2018-10-06 RX ADMIN — SODIUM CHLORIDE 1000 MILLILITER(S): 9 INJECTION INTRAMUSCULAR; INTRAVENOUS; SUBCUTANEOUS at 03:21

## 2018-10-09 NOTE — ED PROVIDER NOTE - CARE PLAN
Principal Discharge DX:	Non-intractable vomiting with nausea, unspecified vomiting type  Assessment and plan of treatment:	pt very well appearing in ER. multiple similar ed visits for same complaint which improved w/ gi cocktail. no sx c/w cardiac event-cp, sob, nix, orthopnea, numbness/tingling of arm. belly labs, sup care, ekg

## 2018-10-09 NOTE — ED PROVIDER NOTE - OBJECTIVE STATEMENT
74 yo female hx of COPD/CVA/HLD/pseudoseizure/vertigo/schizoaffective/arthritis/GERD present c/o nausea and vomiting x 1 with epigastric discomfort since this afternoon. mild decrease in intake. similar to previous events which pt has come to ER for. no cp, fever, dysuria.

## 2018-10-09 NOTE — ED PROVIDER NOTE - NS ED MD DISPO DISCHARGE CCDA
Patient/Caregiver provided printed discharge information.
Alert and oriented to person, place and time

## 2018-10-09 NOTE — ED PROVIDER NOTE - PHYSICAL EXAMINATION
PHYSICAL EXAM:    Constitutional: awake, alert, NAD, well hydrated  Eyes: EOMI, no conj injection  HENT: NC AT  Back: no c/t/l spine ttp  Respiratory: no respiratory distress, breath sounds equal b/l, no wheezing, rhonchi or stridor.   Cardiovascular: RRR nml S1S2  Gastrointestinal: soft, no masses, nontender, nondistended. No guarding or rebound.   Extremities: no peripheral edema  Neurological: AAOx3, CN II-XII grossly intact, no focal numbness or weakness  Skin: no rash  Musculoskeletal: no gross deformity

## 2018-10-09 NOTE — ED PROVIDER NOTE - PLAN OF CARE
pt very well appearing in ER. multiple similar ed visits for same complaint which improved w/ gi cocktail. no sx c/w cardiac event-cp, sob, nix, orthopnea, numbness/tingling of arm. belly labs, sup care, ekg

## 2018-10-09 NOTE — ED PROVIDER NOTE - NS ED ROS FT
Constutional: no fever or rigors  Eyes: no eye redness, acute visual change  ENMT: no ear pain, no throat pain  Card: no chest pain, no palpitations  Pulm: no cough, no shortness of breath  GI: pos abdominal pain, nausea and vomiting  : no dysuria or hematuria  MSK: no limitation in range of motion, no neck pain  Skin: no rash, no abrasion  Neuro: no numbness, no weakness  Heme/Onc: no easy bruising, no bleeding tendency   Allergic: no hives, no throat swelling

## 2018-10-29 ENCOUNTER — EMERGENCY (EMERGENCY)
Facility: HOSPITAL | Age: 73
LOS: 0 days | Discharge: HOME | End: 2018-10-29
Attending: EMERGENCY MEDICINE | Admitting: EMERGENCY MEDICINE

## 2018-10-29 VITALS — TEMPERATURE: 98 F

## 2018-10-29 VITALS
OXYGEN SATURATION: 99 % | TEMPERATURE: 98 F | HEART RATE: 82 BPM | RESPIRATION RATE: 17 BRPM | DIASTOLIC BLOOD PRESSURE: 78 MMHG | SYSTOLIC BLOOD PRESSURE: 133 MMHG

## 2018-10-29 DIAGNOSIS — R19.7 DIARRHEA, UNSPECIFIED: ICD-10-CM

## 2018-10-29 DIAGNOSIS — Z79.899 OTHER LONG TERM (CURRENT) DRUG THERAPY: ICD-10-CM

## 2018-10-29 DIAGNOSIS — Z86.2 PERSONAL HISTORY OF DISEASES OF THE BLOOD AND BLOOD-FORMING ORGANS AND CERTAIN DISORDERS INVOLVING THE IMMUNE MECHANISM: ICD-10-CM

## 2018-10-29 DIAGNOSIS — Z88.0 ALLERGY STATUS TO PENICILLIN: ICD-10-CM

## 2018-10-29 DIAGNOSIS — E03.9 HYPOTHYROIDISM, UNSPECIFIED: ICD-10-CM

## 2018-10-29 DIAGNOSIS — K52.9 NONINFECTIVE GASTROENTERITIS AND COLITIS, UNSPECIFIED: ICD-10-CM

## 2018-10-29 DIAGNOSIS — E78.5 HYPERLIPIDEMIA, UNSPECIFIED: ICD-10-CM

## 2018-10-29 DIAGNOSIS — Z79.01 LONG TERM (CURRENT) USE OF ANTICOAGULANTS: ICD-10-CM

## 2018-10-29 DIAGNOSIS — Z86.73 PERSONAL HISTORY OF TRANSIENT ISCHEMIC ATTACK (TIA), AND CEREBRAL INFARCTION WITHOUT RESIDUAL DEFICITS: ICD-10-CM

## 2018-10-29 DIAGNOSIS — J44.9 CHRONIC OBSTRUCTIVE PULMONARY DISEASE, UNSPECIFIED: ICD-10-CM

## 2018-10-29 LAB
ALBUMIN SERPL ELPH-MCNC: 3.8 G/DL — SIGNIFICANT CHANGE UP (ref 3.5–5.2)
ALP SERPL-CCNC: 74 U/L — SIGNIFICANT CHANGE UP (ref 30–115)
ALT FLD-CCNC: 12 U/L — SIGNIFICANT CHANGE UP (ref 0–41)
ANION GAP SERPL CALC-SCNC: 11 MMOL/L — SIGNIFICANT CHANGE UP (ref 7–14)
APPEARANCE UR: CLEAR — SIGNIFICANT CHANGE UP
AST SERPL-CCNC: 22 U/L — SIGNIFICANT CHANGE UP (ref 0–41)
BASOPHILS # BLD AUTO: 0.01 K/UL — SIGNIFICANT CHANGE UP (ref 0–0.2)
BASOPHILS NFR BLD AUTO: 0.2 % — SIGNIFICANT CHANGE UP (ref 0–1)
BILIRUB SERPL-MCNC: 0.4 MG/DL — SIGNIFICANT CHANGE UP (ref 0.2–1.2)
BILIRUB UR-MCNC: NEGATIVE — SIGNIFICANT CHANGE UP
BUN SERPL-MCNC: 22 MG/DL — HIGH (ref 10–20)
CALCIUM SERPL-MCNC: 9.1 MG/DL — SIGNIFICANT CHANGE UP (ref 8.5–10.1)
CHLORIDE SERPL-SCNC: 101 MMOL/L — SIGNIFICANT CHANGE UP (ref 98–110)
CO2 SERPL-SCNC: 25 MMOL/L — SIGNIFICANT CHANGE UP (ref 17–32)
COLOR SPEC: YELLOW — SIGNIFICANT CHANGE UP
CREAT SERPL-MCNC: 0.7 MG/DL — SIGNIFICANT CHANGE UP (ref 0.7–1.5)
DIFF PNL FLD: ABNORMAL
EOSINOPHIL # BLD AUTO: 0.05 K/UL — SIGNIFICANT CHANGE UP (ref 0–0.7)
EOSINOPHIL NFR BLD AUTO: 0.9 % — SIGNIFICANT CHANGE UP (ref 0–8)
GLUCOSE SERPL-MCNC: 84 MG/DL — SIGNIFICANT CHANGE UP (ref 70–99)
GLUCOSE UR QL: NEGATIVE — SIGNIFICANT CHANGE UP
HCT VFR BLD CALC: 39.8 % — SIGNIFICANT CHANGE UP (ref 37–47)
HGB BLD-MCNC: 13.2 G/DL — SIGNIFICANT CHANGE UP (ref 12–16)
IMM GRANULOCYTES NFR BLD AUTO: 0.3 % — SIGNIFICANT CHANGE UP (ref 0.1–0.3)
KETONES UR-MCNC: NEGATIVE — SIGNIFICANT CHANGE UP
LACTATE SERPL-SCNC: 1.1 MMOL/L — SIGNIFICANT CHANGE UP (ref 0.5–2.2)
LEUKOCYTE ESTERASE UR-ACNC: NEGATIVE — SIGNIFICANT CHANGE UP
LIDOCAIN IGE QN: 27 U/L — SIGNIFICANT CHANGE UP (ref 7–60)
LYMPHOCYTES # BLD AUTO: 1.24 K/UL — SIGNIFICANT CHANGE UP (ref 1.2–3.4)
LYMPHOCYTES # BLD AUTO: 21.3 % — SIGNIFICANT CHANGE UP (ref 20.5–51.1)
MCHC RBC-ENTMCNC: 30.6 PG — SIGNIFICANT CHANGE UP (ref 27–31)
MCHC RBC-ENTMCNC: 33.2 G/DL — SIGNIFICANT CHANGE UP (ref 32–37)
MCV RBC AUTO: 92.1 FL — SIGNIFICANT CHANGE UP (ref 81–99)
MONOCYTES # BLD AUTO: 0.59 K/UL — SIGNIFICANT CHANGE UP (ref 0.1–0.6)
MONOCYTES NFR BLD AUTO: 10.1 % — HIGH (ref 1.7–9.3)
NEUTROPHILS # BLD AUTO: 3.91 K/UL — SIGNIFICANT CHANGE UP (ref 1.4–6.5)
NEUTROPHILS NFR BLD AUTO: 67.2 % — SIGNIFICANT CHANGE UP (ref 42.2–75.2)
NITRITE UR-MCNC: NEGATIVE — SIGNIFICANT CHANGE UP
PH UR: 6.5 — SIGNIFICANT CHANGE UP (ref 5–8)
PLATELET # BLD AUTO: 115 K/UL — LOW (ref 130–400)
POTASSIUM SERPL-MCNC: 3.7 MMOL/L — SIGNIFICANT CHANGE UP (ref 3.5–5)
POTASSIUM SERPL-SCNC: 3.7 MMOL/L — SIGNIFICANT CHANGE UP (ref 3.5–5)
PROT SERPL-MCNC: 6.8 G/DL — SIGNIFICANT CHANGE UP (ref 6–8)
PROT UR-MCNC: NEGATIVE — SIGNIFICANT CHANGE UP
RBC # BLD: 4.32 M/UL — SIGNIFICANT CHANGE UP (ref 4.2–5.4)
RBC # FLD: 15.7 % — HIGH (ref 11.5–14.5)
SODIUM SERPL-SCNC: 137 MMOL/L — SIGNIFICANT CHANGE UP (ref 135–146)
SP GR SPEC: >=1.03 — SIGNIFICANT CHANGE UP (ref 1.01–1.03)
UROBILINOGEN FLD QL: 0.2 — SIGNIFICANT CHANGE UP (ref 0.2–0.2)
WBC # BLD: 5.82 K/UL — SIGNIFICANT CHANGE UP (ref 4.8–10.8)
WBC # FLD AUTO: 5.82 K/UL — SIGNIFICANT CHANGE UP (ref 4.8–10.8)

## 2018-10-29 RX ORDER — SODIUM CHLORIDE 9 MG/ML
1000 INJECTION INTRAMUSCULAR; INTRAVENOUS; SUBCUTANEOUS ONCE
Qty: 0 | Refills: 0 | Status: COMPLETED | OUTPATIENT
Start: 2018-10-29 | End: 2018-10-29

## 2018-10-29 RX ORDER — ACETAMINOPHEN 500 MG
650 TABLET ORAL ONCE
Qty: 0 | Refills: 0 | Status: COMPLETED | OUTPATIENT
Start: 2018-10-29 | End: 2018-10-29

## 2018-10-29 RX ORDER — METRONIDAZOLE 500 MG
1 TABLET ORAL
Qty: 21 | Refills: 0 | OUTPATIENT
Start: 2018-10-29 | End: 2018-11-04

## 2018-10-29 RX ORDER — MOXIFLOXACIN HYDROCHLORIDE TABLETS, 400 MG 400 MG/1
1 TABLET, FILM COATED ORAL
Qty: 14 | Refills: 0 | OUTPATIENT
Start: 2018-10-29 | End: 2018-11-04

## 2018-10-29 RX ADMIN — SODIUM CHLORIDE 1000 MILLILITER(S): 9 INJECTION INTRAMUSCULAR; INTRAVENOUS; SUBCUTANEOUS at 11:39

## 2018-10-29 RX ADMIN — Medication 650 MILLIGRAM(S): at 11:39

## 2018-10-29 NOTE — ED PROVIDER NOTE - OBJECTIVE STATEMENT
74yo F with PMHx COPD, CVA, DLD, GERD, hypothyroid, pseudoseizure, schizoaffective, arthritis, p/w diarrhea and lower abdominal pain since yesterday. Assoc with subjective fever. Denies nausea, vomiting, dysuria, hematuria. Denies headache, lightheadedness, CP, SOB, cough, nausea, vomiting, diarrhea, abd pain, dysuria, hematuria, leg swelling. 72yo F with PMHx COPD, CVA, DLD, GERD, hypothyroid, pseudoseizure, schizoaffective, arthritis, p/w diarrhea and lower abdominal pain since yesterday. Pain is non radiating, no exacerbating factors, relieved after BM. Assoc with subjective fever. Denies nausea, vomiting, dysuria, hematuria. Denies headache, lightheadedness, CP, SOB, cough, back pain, rash.

## 2018-10-29 NOTE — ED PROVIDER NOTE - NS ED ROS FT
Constitutional: No fever  Eyes:  No visual changes  ENMT:  No neck pain  Cardiac:  No chest pain  Respiratory:  No cough, SOB  GI:  No nausea, vomiting. +diarrhea, abdominal pain.  :  No dysuria, hematuria  MS:  No back pain.  Neuro:  No headache or lightheadedness  Skin:  No skin rash  Endocrine: h/o hypothyroid  Except as documented in the HPI,  all other systems are negative.

## 2018-10-29 NOTE — ED ADULT NURSE NOTE - NSIMPLEMENTINTERV_GEN_ALL_ED
Implemented All Universal Safety Interventions:  [place] to call system. Call bell, personal items and telephone within reach. Instruct patient to call for assistance. Room bathroom lighting operational. Non-slip footwear when patient is off stretcher. Physically safe environment: no spills, clutter or unnecessary equipment. Stretcher in lowest position, wheels locked, appropriate side rails in place.

## 2018-10-29 NOTE — ED PROVIDER NOTE - PHYSICAL EXAMINATION
Vital signs reviewed  GENERAL: Patient nontoxic appearing, NAD  HEAD: NCAT  EYES: Anicteric  ENT: MMM  NECK: Supple, non tender  RESPIRATORY: Normal respiratory effort. CTA B/L. No wheezing, rales, rhonchi  CARDIOVASCULAR: Regular rate and rhythm. Normal S1/S2. No murmurs, rubs or gallops  ABDOMEN: Soft. Nondistended. LLQ tenderness. No guarding or rebound. No CVA tenderness  MUSCULOSKELETAL/EXTREMITIES: Brisk cap refill. 2+ radial pulses. No leg edema  SKIN:  Warm and dry. No acute rash  NEURO: AAOx3. No gross FND.  PSYCHIATRIC: Cooperative. Affect appropriate

## 2018-12-04 ENCOUNTER — INPATIENT (INPATIENT)
Facility: HOSPITAL | Age: 73
LOS: 5 days | Discharge: NURSING, REHAB OR LONG-TERM CARE | End: 2018-12-10
Attending: INTERNAL MEDICINE | Admitting: INTERNAL MEDICINE
Payer: MEDICARE

## 2018-12-04 VITALS
RESPIRATION RATE: 18 BRPM | OXYGEN SATURATION: 95 % | SYSTOLIC BLOOD PRESSURE: 147 MMHG | DIASTOLIC BLOOD PRESSURE: 64 MMHG | TEMPERATURE: 97 F | HEART RATE: 91 BPM

## 2018-12-04 LAB
ALBUMIN SERPL ELPH-MCNC: 3.8 G/DL — SIGNIFICANT CHANGE UP (ref 3.5–5.2)
ALLERGY+IMMUNOLOGY DIAG STUDY NOTE: SIGNIFICANT CHANGE UP
ALP SERPL-CCNC: 72 U/L — SIGNIFICANT CHANGE UP (ref 30–115)
ALT FLD-CCNC: 6 U/L — SIGNIFICANT CHANGE UP (ref 0–41)
ANION GAP SERPL CALC-SCNC: 12 MMOL/L — SIGNIFICANT CHANGE UP (ref 7–14)
APTT BLD: 26.5 SEC — LOW (ref 27–39.2)
AST SERPL-CCNC: 17 U/L — SIGNIFICANT CHANGE UP (ref 0–41)
BASOPHILS # BLD AUTO: 0.01 K/UL — SIGNIFICANT CHANGE UP (ref 0–0.2)
BASOPHILS NFR BLD AUTO: 0.3 % — SIGNIFICANT CHANGE UP (ref 0–1)
BILIRUB SERPL-MCNC: 0.3 MG/DL — SIGNIFICANT CHANGE UP (ref 0.2–1.2)
BUN SERPL-MCNC: 19 MG/DL — SIGNIFICANT CHANGE UP (ref 10–20)
CALCIUM SERPL-MCNC: 9.3 MG/DL — SIGNIFICANT CHANGE UP (ref 8.5–10.1)
CHLORIDE SERPL-SCNC: 107 MMOL/L — SIGNIFICANT CHANGE UP (ref 98–110)
CO2 SERPL-SCNC: 27 MMOL/L — SIGNIFICANT CHANGE UP (ref 17–32)
CREAT SERPL-MCNC: 0.8 MG/DL — SIGNIFICANT CHANGE UP (ref 0.7–1.5)
EOSINOPHIL # BLD AUTO: 0.07 K/UL — SIGNIFICANT CHANGE UP (ref 0–0.7)
EOSINOPHIL NFR BLD AUTO: 2.1 % — SIGNIFICANT CHANGE UP (ref 0–8)
ETHANOL SERPL-MCNC: <10 MG/DL — HIGH
GLUCOSE SERPL-MCNC: 89 MG/DL — SIGNIFICANT CHANGE UP (ref 70–99)
HCT VFR BLD CALC: 34.5 % — LOW (ref 37–47)
HGB BLD-MCNC: 11.4 G/DL — LOW (ref 12–16)
IMM GRANULOCYTES NFR BLD AUTO: 0.6 % — HIGH (ref 0.1–0.3)
INR BLD: 1.12 RATIO — SIGNIFICANT CHANGE UP (ref 0.65–1.3)
LACTATE SERPL-SCNC: 1.5 MMOL/L — SIGNIFICANT CHANGE UP (ref 0.5–2.2)
LIDOCAIN IGE QN: 29 U/L — SIGNIFICANT CHANGE UP (ref 7–60)
LYMPHOCYTES # BLD AUTO: 1.35 K/UL — SIGNIFICANT CHANGE UP (ref 1.2–3.4)
LYMPHOCYTES # BLD AUTO: 40.4 % — SIGNIFICANT CHANGE UP (ref 20.5–51.1)
MCHC RBC-ENTMCNC: 31.4 PG — HIGH (ref 27–31)
MCHC RBC-ENTMCNC: 33 G/DL — SIGNIFICANT CHANGE UP (ref 32–37)
MCV RBC AUTO: 95 FL — SIGNIFICANT CHANGE UP (ref 81–99)
MONOCYTES # BLD AUTO: 0.44 K/UL — SIGNIFICANT CHANGE UP (ref 0.1–0.6)
MONOCYTES NFR BLD AUTO: 13.2 % — HIGH (ref 1.7–9.3)
NEUTROPHILS # BLD AUTO: 1.45 K/UL — SIGNIFICANT CHANGE UP (ref 1.4–6.5)
NEUTROPHILS NFR BLD AUTO: 43.4 % — SIGNIFICANT CHANGE UP (ref 42.2–75.2)
NRBC # BLD: 0 /100 WBCS — SIGNIFICANT CHANGE UP (ref 0–0)
PLATELET # BLD AUTO: 70 K/UL — LOW (ref 130–400)
POTASSIUM SERPL-MCNC: 4 MMOL/L — SIGNIFICANT CHANGE UP (ref 3.5–5)
POTASSIUM SERPL-SCNC: 4 MMOL/L — SIGNIFICANT CHANGE UP (ref 3.5–5)
PROT SERPL-MCNC: 6.5 G/DL — SIGNIFICANT CHANGE UP (ref 6–8)
PROTHROM AB SERPL-ACNC: 12.1 SEC — SIGNIFICANT CHANGE UP (ref 9.95–12.87)
RBC # BLD: 3.63 M/UL — LOW (ref 4.2–5.4)
RBC # FLD: 14.4 % — SIGNIFICANT CHANGE UP (ref 11.5–14.5)
SODIUM SERPL-SCNC: 146 MMOL/L — SIGNIFICANT CHANGE UP (ref 135–146)
TYPE + AB SCN PNL BLD: SIGNIFICANT CHANGE UP
WBC # BLD: 3.34 K/UL — LOW (ref 4.8–10.8)
WBC # FLD AUTO: 3.34 K/UL — LOW (ref 4.8–10.8)

## 2018-12-04 RX ORDER — SODIUM CHLORIDE 9 MG/ML
1000 INJECTION INTRAMUSCULAR; INTRAVENOUS; SUBCUTANEOUS ONCE
Qty: 0 | Refills: 0 | Status: COMPLETED | OUTPATIENT
Start: 2018-12-04 | End: 2018-12-04

## 2018-12-04 RX ADMIN — SODIUM CHLORIDE 1000 MILLILITER(S): 9 INJECTION INTRAMUSCULAR; INTRAVENOUS; SUBCUTANEOUS at 20:21

## 2018-12-04 NOTE — ED ADULT NURSE NOTE - NSIMPLEMENTINTERV_GEN_ALL_ED
Implemented All Fall with Harm Risk Interventions:  [place] to call system. Call bell, personal items and telephone within reach. Instruct patient to call for assistance. Room bathroom lighting operational. Non-slip footwear when patient is off stretcher. Physically safe environment: no spills, clutter or unnecessary equipment. Stretcher in lowest position, wheels locked, appropriate side rails in place. Provide visual cue, wrist band, yellow gown, etc. Monitor gait and stability. Monitor for mental status changes and reorient to person, place, and time. Review medications for side effects contributing to fall risk. Reinforce activity limits and safety measures with patient and family. Provide visual clues: red socks.

## 2018-12-04 NOTE — ED PROVIDER NOTE - PHYSICAL EXAMINATION
CONSTITUTIONAL: Well-developed; well-nourished; in no acute distress, speaking in full sentences  SKIN: warm, dry, ecchymosis to R elbow, L clavicle  HEAD: Normocephalic; atraumatic  EYES: PERRL, EOMI, no conjunctival erythema  ENT: No nasal discharge; airway clear, mucous membranes moist  NECK: Supple; non tender, FROM, no midline tenderness  back: no midline tenderness or step offs, no ecchymosis, erythema, edema  CARD: +S1, S2 no murmurs, gallops, or rubs. Regular rate and rhythm. radial 2+ b/l, dp 2+ b/l, ecchymosis L clavicle ttp, no other chest wall tenderness, no crepitus  RESP: No wheezes, rales or rhonchi. CTABL  ABD: soft ntnd, no rebound, no guarding, no rigidity,   EXT: moves all extremities,  No clubbing, cyanosis or edema.   NEURO: Alert, oriented, grossly unremarkable, no focal deficits, cn ii-xii grossly intact, follows commands,   PSYCH: Cooperative

## 2018-12-04 NOTE — ED PROVIDER NOTE - NS ED ROS FT
General: No fevers, chills, nausea, vomiting  Eyes:  No visual changes, eye pain or discharge.  ENMT:  No hearing changes, pain,   Cardiac:  No chest pain, SOB or edema.  Respiratory:  No cough or respiratory distress.   GI:  No nausea, vomiting, or abdominal pain.  :  No dysuria,   MS:  No muscle weakness, +R elbow pain  Neuro:  No headache or weakness.  No LOC.  Skin:  No skin rash.   Endocrine: +history of thyroid disease

## 2018-12-04 NOTE — ED PROVIDER NOTE - CARE PLAN
Principal Discharge DX:	Compression fracture of thoracic vertebra Principal Discharge DX:	Compression fracture of thoracic vertebra  Secondary Diagnosis:	Frequent falls

## 2018-12-04 NOTE — ED PROVIDER NOTE - ATTENDING CONTRIBUTION TO CARE
74 year old, + fall from bed, amrita dout of bed, come sin with kild back pain, + pain when ambulating, no heda injury, no loc, no n/v/d, no cp/sob.     CONSTITUTIONAL: Well-developed; well-nourished; in no acute distress. Sitting up and providing appropriate history and physical examination  SKIN: skin exam is warm and dry, no acute rash.  TRAUMA: Primary and Secondary surveys intact, GCS 15, no midline CS spine tenderness, + mild T 4 tenderness and L 3 tenderness, no saddle aesthesia, Pelvis stable, + moving all extremities, FAST Negative   HEAD: Normocephalic; atraumatic.  EYES: PERRL, 3 mm bilateral, no nystagmus, EOM intact; conjunctiva and sclera clear.  ENT: No nasal discharge; airway clear.  NECK: Supple; non tender. + full passive ROM in all directions. No JVD  CARD: S1, S2 normal; no murmurs, gallops, or rubs. Regular rate and rhythm. + Symmetric Strong Pulses  RESP: No wheezes, rales or rhonchi. Good air movement bilaterally  ABD: soft; non-distended; non-tender. No Rebound, No Guarding, No signs of peritonitis, No CVA tenderness. No pulsatile abdominal mass. + Strong and Symmetric Pulses  EXT: Normal ROM. No clubbing, cyanosis or edema. Dp and Pt Pulses intact. Cap refill less than 3 seconds  NEURO: CN 2-12 intact, normal finger to nose, no sensory or motor deficits, Alert, oriented, grossly unremarkable. No Focal deficits. GCS 15. NIH 0  PSYCH: Cooperative, appropriate.    Discussed with Nsx MARI l[place], Trauma Resident Manny, Ed Attending Frances, will transfer [place] for mri and further evaluation

## 2018-12-04 NOTE — ED PROVIDER NOTE - OBJECTIVE STATEMENT
74yo f from [facility] pmhx epilepsy, psych illness, copd, hypothyroidism bibems s/p fall out of bed. pt states she was trying to get her sneakers when she fell out of bed. pt currently reports pain to R elbow and L clavicle. denies loc, weakness, numbness, tingling.

## 2018-12-04 NOTE — ED PROVIDER NOTE - PROGRESS NOTE DETAILS
received patient's from [facility], patient was sent to ED for neurosurgery and trauma evaluation. patient reported frequent fall over the past week. trauma and neurosurgery evaluated patient, fracture on CT appear chronic. no acute surgical intervention. recommend admission to medicine for rehab evaluation. trauma and neurosurgery evaluated patient, fracture on CT appear chronic. no acute surgical intervention. recommend admission to medicine for rehab evaluation and TLSO Brace. Pt transferred from [facility] for trauma and nsgy eval. Pt without complaints.  Requesting food.

## 2018-12-05 DIAGNOSIS — Z98.891 HISTORY OF UTERINE SCAR FROM PREVIOUS SURGERY: Chronic | ICD-10-CM

## 2018-12-05 DIAGNOSIS — S22.000A WEDGE COMPRESSION FRACTURE OF UNSPECIFIED THORACIC VERTEBRA, INITIAL ENCOUNTER FOR CLOSED FRACTURE: ICD-10-CM

## 2018-12-05 DIAGNOSIS — Z98.890 OTHER SPECIFIED POSTPROCEDURAL STATES: Chronic | ICD-10-CM

## 2018-12-05 LAB
ALBUMIN SERPL ELPH-MCNC: 3.3 G/DL — LOW (ref 3.5–5.2)
ALBUMIN SERPL ELPH-MCNC: 3.4 G/DL — LOW (ref 3.5–5.2)
ALP SERPL-CCNC: 59 U/L — SIGNIFICANT CHANGE UP (ref 30–115)
ALP SERPL-CCNC: 61 U/L — SIGNIFICANT CHANGE UP (ref 30–115)
ALT FLD-CCNC: 5 U/L — SIGNIFICANT CHANGE UP (ref 0–41)
ALT FLD-CCNC: 6 U/L — SIGNIFICANT CHANGE UP (ref 0–41)
ANION GAP SERPL CALC-SCNC: 11 MMOL/L — SIGNIFICANT CHANGE UP (ref 7–14)
ANION GAP SERPL CALC-SCNC: 14 MMOL/L — SIGNIFICANT CHANGE UP (ref 7–14)
AST SERPL-CCNC: 15 U/L — SIGNIFICANT CHANGE UP (ref 0–41)
AST SERPL-CCNC: 17 U/L — SIGNIFICANT CHANGE UP (ref 0–41)
BASOPHILS # BLD AUTO: 0.01 K/UL — SIGNIFICANT CHANGE UP (ref 0–0.2)
BASOPHILS # BLD AUTO: 0.01 K/UL — SIGNIFICANT CHANGE UP (ref 0–0.2)
BASOPHILS NFR BLD AUTO: 0.2 % — SIGNIFICANT CHANGE UP (ref 0–1)
BASOPHILS NFR BLD AUTO: 0.3 % — SIGNIFICANT CHANGE UP (ref 0–1)
BILIRUB SERPL-MCNC: 0.2 MG/DL — SIGNIFICANT CHANGE UP (ref 0.2–1.2)
BILIRUB SERPL-MCNC: 0.3 MG/DL — SIGNIFICANT CHANGE UP (ref 0.2–1.2)
BUN SERPL-MCNC: 12 MG/DL — SIGNIFICANT CHANGE UP (ref 10–20)
BUN SERPL-MCNC: 15 MG/DL — SIGNIFICANT CHANGE UP (ref 10–20)
CALCIUM SERPL-MCNC: 8.5 MG/DL — SIGNIFICANT CHANGE UP (ref 8.5–10.1)
CALCIUM SERPL-MCNC: 8.7 MG/DL — SIGNIFICANT CHANGE UP (ref 8.5–10.1)
CHLORIDE SERPL-SCNC: 101 MMOL/L — SIGNIFICANT CHANGE UP (ref 98–110)
CHLORIDE SERPL-SCNC: 102 MMOL/L — SIGNIFICANT CHANGE UP (ref 98–110)
CO2 SERPL-SCNC: 26 MMOL/L — SIGNIFICANT CHANGE UP (ref 17–32)
CO2 SERPL-SCNC: 28 MMOL/L — SIGNIFICANT CHANGE UP (ref 17–32)
CREAT SERPL-MCNC: 0.7 MG/DL — SIGNIFICANT CHANGE UP (ref 0.7–1.5)
CREAT SERPL-MCNC: 0.7 MG/DL — SIGNIFICANT CHANGE UP (ref 0.7–1.5)
EOSINOPHIL # BLD AUTO: 0.09 K/UL — SIGNIFICANT CHANGE UP (ref 0–0.7)
EOSINOPHIL # BLD AUTO: 0.09 K/UL — SIGNIFICANT CHANGE UP (ref 0–0.7)
EOSINOPHIL NFR BLD AUTO: 2.1 % — SIGNIFICANT CHANGE UP (ref 0–8)
EOSINOPHIL NFR BLD AUTO: 2.6 % — SIGNIFICANT CHANGE UP (ref 0–8)
GLUCOSE BLDC GLUCOMTR-MCNC: 107 MG/DL — HIGH (ref 70–99)
GLUCOSE BLDC GLUCOMTR-MCNC: 124 MG/DL — HIGH (ref 70–99)
GLUCOSE BLDC GLUCOMTR-MCNC: 97 MG/DL — SIGNIFICANT CHANGE UP (ref 70–99)
GLUCOSE SERPL-MCNC: 101 MG/DL — HIGH (ref 70–99)
GLUCOSE SERPL-MCNC: 92 MG/DL — SIGNIFICANT CHANGE UP (ref 70–99)
HCT VFR BLD CALC: 31.5 % — LOW (ref 37–47)
HCT VFR BLD CALC: 32.8 % — LOW (ref 37–47)
HGB BLD-MCNC: 10.6 G/DL — LOW (ref 12–16)
HGB BLD-MCNC: 11 G/DL — LOW (ref 12–16)
IMM GRANULOCYTES NFR BLD AUTO: 0.2 % — SIGNIFICANT CHANGE UP (ref 0.1–0.3)
IMM GRANULOCYTES NFR BLD AUTO: 0.3 % — SIGNIFICANT CHANGE UP (ref 0.1–0.3)
LACTATE SERPL-SCNC: 1 MMOL/L — SIGNIFICANT CHANGE UP (ref 0.5–2.2)
LYMPHOCYTES # BLD AUTO: 1.15 K/UL — LOW (ref 1.2–3.4)
LYMPHOCYTES # BLD AUTO: 1.5 K/UL — SIGNIFICANT CHANGE UP (ref 1.2–3.4)
LYMPHOCYTES # BLD AUTO: 32.8 % — SIGNIFICANT CHANGE UP (ref 20.5–51.1)
LYMPHOCYTES # BLD AUTO: 35.7 % — SIGNIFICANT CHANGE UP (ref 20.5–51.1)
MAGNESIUM SERPL-MCNC: 1.7 MG/DL — LOW (ref 1.8–2.4)
MAGNESIUM SERPL-MCNC: 1.8 MG/DL — SIGNIFICANT CHANGE UP (ref 1.8–2.4)
MCHC RBC-ENTMCNC: 30.8 PG — SIGNIFICANT CHANGE UP (ref 27–31)
MCHC RBC-ENTMCNC: 30.8 PG — SIGNIFICANT CHANGE UP (ref 27–31)
MCHC RBC-ENTMCNC: 33.5 G/DL — SIGNIFICANT CHANGE UP (ref 32–37)
MCHC RBC-ENTMCNC: 33.7 G/DL — SIGNIFICANT CHANGE UP (ref 32–37)
MCV RBC AUTO: 91.6 FL — SIGNIFICANT CHANGE UP (ref 81–99)
MCV RBC AUTO: 91.9 FL — SIGNIFICANT CHANGE UP (ref 81–99)
MONOCYTES # BLD AUTO: 0.57 K/UL — SIGNIFICANT CHANGE UP (ref 0.1–0.6)
MONOCYTES # BLD AUTO: 0.66 K/UL — HIGH (ref 0.1–0.6)
MONOCYTES NFR BLD AUTO: 15.7 % — HIGH (ref 1.7–9.3)
MONOCYTES NFR BLD AUTO: 16.2 % — HIGH (ref 1.7–9.3)
NEUTROPHILS # BLD AUTO: 1.68 K/UL — SIGNIFICANT CHANGE UP (ref 1.4–6.5)
NEUTROPHILS # BLD AUTO: 1.93 K/UL — SIGNIFICANT CHANGE UP (ref 1.4–6.5)
NEUTROPHILS NFR BLD AUTO: 46.1 % — SIGNIFICANT CHANGE UP (ref 42.2–75.2)
NEUTROPHILS NFR BLD AUTO: 47.8 % — SIGNIFICANT CHANGE UP (ref 42.2–75.2)
NRBC # BLD: 0 /100 WBCS — SIGNIFICANT CHANGE UP (ref 0–0)
NRBC # BLD: 0 /100 WBCS — SIGNIFICANT CHANGE UP (ref 0–0)
PHOSPHATE SERPL-MCNC: 2.9 MG/DL — SIGNIFICANT CHANGE UP (ref 2.1–4.9)
PHOSPHATE SERPL-MCNC: 3.3 MG/DL — SIGNIFICANT CHANGE UP (ref 2.1–4.9)
PLATELET # BLD AUTO: 65 K/UL — LOW (ref 130–400)
PLATELET # BLD AUTO: 68 K/UL — LOW (ref 130–400)
POTASSIUM SERPL-MCNC: 3.8 MMOL/L — SIGNIFICANT CHANGE UP (ref 3.5–5)
POTASSIUM SERPL-MCNC: 4.5 MMOL/L — SIGNIFICANT CHANGE UP (ref 3.5–5)
POTASSIUM SERPL-SCNC: 3.8 MMOL/L — SIGNIFICANT CHANGE UP (ref 3.5–5)
POTASSIUM SERPL-SCNC: 4.5 MMOL/L — SIGNIFICANT CHANGE UP (ref 3.5–5)
PROT SERPL-MCNC: 5.7 G/DL — LOW (ref 6–8)
PROT SERPL-MCNC: 6 G/DL — SIGNIFICANT CHANGE UP (ref 6–8)
RBC # BLD: 3.44 M/UL — LOW (ref 4.2–5.4)
RBC # BLD: 3.57 M/UL — LOW (ref 4.2–5.4)
RBC # FLD: 14.1 % — SIGNIFICANT CHANGE UP (ref 11.5–14.5)
RBC # FLD: 14.1 % — SIGNIFICANT CHANGE UP (ref 11.5–14.5)
SODIUM SERPL-SCNC: 141 MMOL/L — SIGNIFICANT CHANGE UP (ref 135–146)
SODIUM SERPL-SCNC: 141 MMOL/L — SIGNIFICANT CHANGE UP (ref 135–146)
TROPONIN T SERPL-MCNC: <0.01 NG/ML — SIGNIFICANT CHANGE UP
TROPONIN T SERPL-MCNC: <0.01 NG/ML — SIGNIFICANT CHANGE UP
WBC # BLD: 3.51 K/UL — LOW (ref 4.8–10.8)
WBC # BLD: 4.2 K/UL — LOW (ref 4.8–10.8)
WBC # FLD AUTO: 3.51 K/UL — LOW (ref 4.8–10.8)
WBC # FLD AUTO: 4.2 K/UL — LOW (ref 4.8–10.8)

## 2018-12-05 PROCEDURE — 99223 1ST HOSP IP/OBS HIGH 75: CPT

## 2018-12-05 RX ORDER — ENOXAPARIN SODIUM 100 MG/ML
40 INJECTION SUBCUTANEOUS EVERY 24 HOURS
Qty: 0 | Refills: 0 | Status: DISCONTINUED | OUTPATIENT
Start: 2018-12-05 | End: 2018-12-10

## 2018-12-05 RX ORDER — ACETAMINOPHEN 500 MG
650 TABLET ORAL EVERY 6 HOURS
Qty: 0 | Refills: 0 | Status: DISCONTINUED | OUTPATIENT
Start: 2018-12-05 | End: 2018-12-10

## 2018-12-05 RX ORDER — DOCUSATE SODIUM 100 MG
100 CAPSULE ORAL
Qty: 0 | Refills: 0 | Status: DISCONTINUED | OUTPATIENT
Start: 2018-12-05 | End: 2018-12-10

## 2018-12-05 RX ORDER — MECLIZINE HCL 12.5 MG
25 TABLET ORAL THREE TIMES A DAY
Qty: 0 | Refills: 0 | Status: DISCONTINUED | OUTPATIENT
Start: 2018-12-05 | End: 2018-12-07

## 2018-12-05 RX ORDER — OXYCODONE HYDROCHLORIDE 5 MG/1
5 TABLET ORAL EVERY 6 HOURS
Qty: 0 | Refills: 0 | Status: DISCONTINUED | OUTPATIENT
Start: 2018-12-05 | End: 2018-12-10

## 2018-12-05 RX ORDER — CHLORHEXIDINE GLUCONATE 213 G/1000ML
1 SOLUTION TOPICAL
Qty: 0 | Refills: 0 | Status: DISCONTINUED | OUTPATIENT
Start: 2018-12-05 | End: 2018-12-10

## 2018-12-05 RX ORDER — ASPIRIN/CALCIUM CARB/MAGNESIUM 324 MG
1 TABLET ORAL
Qty: 0 | Refills: 0 | COMMUNITY

## 2018-12-05 RX ORDER — FAMOTIDINE 10 MG/ML
20 INJECTION INTRAVENOUS
Qty: 0 | Refills: 0 | Status: DISCONTINUED | OUTPATIENT
Start: 2018-12-05 | End: 2018-12-10

## 2018-12-05 RX ORDER — LEVOTHYROXINE SODIUM 125 MCG
50 TABLET ORAL DAILY
Qty: 0 | Refills: 0 | Status: DISCONTINUED | OUTPATIENT
Start: 2018-12-05 | End: 2018-12-10

## 2018-12-05 RX ORDER — NICOTINE POLACRILEX 2 MG
1 GUM BUCCAL DAILY
Qty: 0 | Refills: 0 | Status: DISCONTINUED | OUTPATIENT
Start: 2018-12-05 | End: 2018-12-10

## 2018-12-05 RX ORDER — LACTULOSE 10 G/15ML
20 SOLUTION ORAL DAILY
Qty: 0 | Refills: 0 | Status: DISCONTINUED | OUTPATIENT
Start: 2018-12-05 | End: 2018-12-10

## 2018-12-05 RX ORDER — CLOPIDOGREL BISULFATE 75 MG/1
75 TABLET, FILM COATED ORAL DAILY
Qty: 0 | Refills: 0 | Status: DISCONTINUED | OUTPATIENT
Start: 2018-12-05 | End: 2018-12-10

## 2018-12-05 RX ORDER — CLOPIDOGREL BISULFATE 75 MG/1
0 TABLET, FILM COATED ORAL
Qty: 0 | Refills: 0 | COMMUNITY

## 2018-12-05 RX ORDER — ATORVASTATIN CALCIUM 80 MG/1
80 TABLET, FILM COATED ORAL AT BEDTIME
Qty: 0 | Refills: 0 | Status: DISCONTINUED | OUTPATIENT
Start: 2018-12-05 | End: 2018-12-10

## 2018-12-05 RX ORDER — MIRTAZAPINE 45 MG/1
15 TABLET, ORALLY DISINTEGRATING ORAL AT BEDTIME
Qty: 0 | Refills: 0 | Status: DISCONTINUED | OUTPATIENT
Start: 2018-12-05 | End: 2018-12-10

## 2018-12-05 RX ORDER — LATANOPROST 0.05 MG/ML
1 SOLUTION/ DROPS OPHTHALMIC; TOPICAL AT BEDTIME
Qty: 0 | Refills: 0 | Status: DISCONTINUED | OUTPATIENT
Start: 2018-12-05 | End: 2018-12-10

## 2018-12-05 RX ORDER — RISPERIDONE 4 MG/1
2 TABLET ORAL AT BEDTIME
Qty: 0 | Refills: 0 | Status: DISCONTINUED | OUTPATIENT
Start: 2018-12-05 | End: 2018-12-10

## 2018-12-05 RX ORDER — GABAPENTIN 400 MG/1
600 CAPSULE ORAL THREE TIMES A DAY
Qty: 0 | Refills: 0 | Status: DISCONTINUED | OUTPATIENT
Start: 2018-12-05 | End: 2018-12-10

## 2018-12-05 RX ORDER — OXYBUTYNIN CHLORIDE 5 MG
5 TABLET ORAL
Qty: 0 | Refills: 0 | Status: DISCONTINUED | OUTPATIENT
Start: 2018-12-05 | End: 2018-12-10

## 2018-12-05 RX ORDER — IPRATROPIUM BROMIDE 0.2 MG/ML
1 SOLUTION, NON-ORAL INHALATION
Qty: 0 | Refills: 0 | Status: DISCONTINUED | OUTPATIENT
Start: 2018-12-05 | End: 2018-12-09

## 2018-12-05 RX ORDER — LEVOTHYROXINE SODIUM 125 MCG
1 TABLET ORAL
Qty: 0 | Refills: 0 | COMMUNITY

## 2018-12-05 RX ORDER — DIVALPROEX SODIUM 500 MG/1
500 TABLET, DELAYED RELEASE ORAL THREE TIMES A DAY
Qty: 0 | Refills: 0 | Status: DISCONTINUED | OUTPATIENT
Start: 2018-12-05 | End: 2018-12-07

## 2018-12-05 RX ADMIN — ENOXAPARIN SODIUM 40 MILLIGRAM(S): 100 INJECTION SUBCUTANEOUS at 12:33

## 2018-12-05 RX ADMIN — RISPERIDONE 2 MILLIGRAM(S): 4 TABLET ORAL at 22:53

## 2018-12-05 RX ADMIN — MIRTAZAPINE 15 MILLIGRAM(S): 45 TABLET, ORALLY DISINTEGRATING ORAL at 22:53

## 2018-12-05 RX ADMIN — FAMOTIDINE 20 MILLIGRAM(S): 10 INJECTION INTRAVENOUS at 18:11

## 2018-12-05 RX ADMIN — CLOPIDOGREL BISULFATE 75 MILLIGRAM(S): 75 TABLET, FILM COATED ORAL at 12:33

## 2018-12-05 RX ADMIN — GABAPENTIN 600 MILLIGRAM(S): 400 CAPSULE ORAL at 14:23

## 2018-12-05 RX ADMIN — DIVALPROEX SODIUM 500 MILLIGRAM(S): 500 TABLET, DELAYED RELEASE ORAL at 14:23

## 2018-12-05 RX ADMIN — GABAPENTIN 600 MILLIGRAM(S): 400 CAPSULE ORAL at 22:53

## 2018-12-05 RX ADMIN — Medication 1 PATCH: at 12:32

## 2018-12-05 RX ADMIN — ATORVASTATIN CALCIUM 80 MILLIGRAM(S): 80 TABLET, FILM COATED ORAL at 22:53

## 2018-12-05 RX ADMIN — LATANOPROST 1 DROP(S): 0.05 SOLUTION/ DROPS OPHTHALMIC; TOPICAL at 22:53

## 2018-12-05 RX ADMIN — Medication 50 MICROGRAM(S): at 12:33

## 2018-12-05 RX ADMIN — DIVALPROEX SODIUM 500 MILLIGRAM(S): 500 TABLET, DELAYED RELEASE ORAL at 22:53

## 2018-12-05 RX ADMIN — Medication 5 MILLIGRAM(S): at 18:11

## 2018-12-05 NOTE — H&P ADULT - ASSESSMENT
Assessment:  ·	Recurrent falls possible syncope  ·	T5 compression deformity on CT  ·	Possible orthostasis  ·	Irregular heart rate: Sinus bradycardia with PACs  ·	Polypharmacy: multiple sedatives and anticholinergics  ·	Possible Arrythmia/ Seizure  ·	Bipolar d/o. Schizoaffective/ pseudoseizures/ hx of CVA/ Glaucoma/ COPD    Plan:  ·	Telemetry. Neurology eval. Keppra level. Seizure and fall precautions  ·	Syncope Work up if orthostatics negative  ·	consider EP for loop recorder  ·	F/U Neurosurgery. Might need MRI of spine. TLSO brace. Pain control  ·	Continue home meds  ·	DVT ppx  ·	From assisted living. Asses for living conditions safety. Social service referral

## 2018-12-05 NOTE — CONSULT NOTE ADULT - SUBJECTIVE AND OBJECTIVE BOX
72 yo F with multiple falls in past couple weeks, fell today in assisted living facility while was ambulating with walker, no LOC, no head injury. [facility]ed to [facility] and [facility]fered to [facility] after [facility]. Vitals stable, complains on multiple bruises all over body.    PMH/PSH: schizoaffective d/o, bipolar d/o, pseudoseizures, COPD, GERD, glaucoma, hypothyroidism  Meds: Plavix, depakoate, neurontin, synthroid    Physical exam:  Gen: a&ox3  Lungs: clear b/l  Abd: soft, NT, ND  Neuro: GCS 15, intact but very weak  Garcia: no stepoffs, c, t, l spine tenderness                          11.4   3.34  )-----------( 70       ( 04 Dec 2018 19:55 )             34.5   12-04    146  |  107  |  19  ----------------------------<  89  4.0   |  27  |  0.8    Ca    9.3      04 Dec 2018 19:55    TPro  6.5  /  Alb  3.8  /  TBili  0.3  /  DBili  x   /  AST  17  /  ALT  6   /  AlkPhos  72  12-04    < from: CT Chest/ABdomen/Pelvis w/ IV Cont (12.04.18 @ 22:37) >  IMPRESSION:           Age-indeterminate T5 mild compression deformity, correlate for point   tenderness.    Otherwise, no evidence of acute traumatic pathology within the chest,   abdomen or pelvis.    < end of copied text >    < from: CT Head No Cont (12.04.18 @ 22:36) >  IMPRESSION:    No acute intracranial pathology.    < end of copied text >    < from: CT Cervical Spine No Cont (12.04.18 @ 22:36) >  IMPRESSION:      No evidence of a cervical spine fracture or subluxation.    < end of copied text >

## 2018-12-05 NOTE — CONSULT NOTE ADULT - SUBJECTIVE AND OBJECTIVE BOX
Pt with significant psychiatric hx resides in assisted living. States she has been falling frequently for past several weeks. Pt ambulated with walker and last night she fell, denies head trauma or LOC. No numbness, weakness of lower extremities.      MEDICATIONS  (STANDING):    MEDICATIONS  (PRN):      Allergies    Dilantin (Unknown)  Keppra (Unknown)  penicillin (Unknown)  phenobarbital (Unknown)  Tegretol (Unknown)    Intolerances    Topamax (Drowsiness)    73y    Vital Signs Last 24 Hrs  T(C): 36.6 (05 Dec 2018 00:04), Max: 36.6 (05 Dec 2018 00:04)  T(F): 97.9 (05 Dec 2018 00:04), Max: 97.9 (05 Dec 2018 00:04)  HR: 68 (05 Dec 2018 00:04) (68 - 91)  BP: 143/70 (05 Dec 2018 00:04) (137/77 - 147/64)  BP(mean): --  RR: 16 (05 Dec 2018 00:04) (16 - 18)  SpO2: 96% (05 Dec 2018 00:04) (95% - 96%)    PHYSICAL EXAM:    GENERAL: NAD, well-groomed, well-developed  HEAD:  Atraumatic, Normocephalic  EYES: EOMI, PERRLA, conjunctiva and sclera clear  NERVOUS SYSTEM:  Awake  alert oriented to self, place situation   Fund of knowledge, recent and remote memory are intact   Mood; normal affect   CN II-XII intact PERRRL, EOMI, no ptosis, no Nystagmus, normal shoulder shrug   Face symmetrical tongue is midline speech is clear and fluent  Motor: 5/5 UE/LE all muscle groups , mild tenderness along lower cervical, thoracic and lumbar spine without any stepoff  Sensory: No deficit to light touch   Gait is not tested      EXTREMITIES:  2+ Peripheral Pulses, No clubbing, cyanosis, or edema      LABS:                        11.4   3.34  )-----------( 70       ( 04 Dec 2018 19:55 )             34.5     12-04    146  |  107  |  19  ----------------------------<  89  4.0   |  27  |  0.8    Ca    9.3      04 Dec 2018 19:55    TPro  6.5  /  Alb  3.8  /  TBili  0.3  /  DBili  x   /  AST  17  /  ALT  6   /  AlkPhos  72  12-04    PT/INR - ( 04 Dec 2018 19:55 )   PT: 12.10 sec;   INR: 1.12 ratio         PTT - ( 04 Dec 2018 19:55 )  PTT:26.5 sec      RADIOLOGY & ADDITIONAL TESTS:  < from: CT Abdomen and Pelvis w/ IV Cont (12.04.18 @ 22:37) >    IMPRESSION:           Age-indeterminate T5 mild compression deformity, correlate for point   tenderness.    Otherwise, no evidence of acute traumatic pathology within the chest,   abdomen or pelvis.        < end of copied text >  < from: CT Abdomen and Pelvis w/ IV Cont (12.04.18 @ 22:37) >    IMPRESSION:           Age-indeterminate T5 mild compression deformity, correlate for point   tenderness.    Otherwise, no evidence of acute traumatic pathology within the chest,   abdomen or pelvis.        < end of copied text >

## 2018-12-05 NOTE — H&P ADULT - HISTORY OF PRESENT ILLNESS
72 yo F with PMH of schizoaffective d/o, bipolar d/o, hx of pseudoseizures CVA, Vertigo, Glaucoma, DLD presenting with above chief complaint. She reports for the last three weeks she has been falling multiple times mostly when she stands up from the chair or bed or when she picks up objects from the floor. She reports not losing consciousness but before falling she feels dizzy and light headed. Currently whenever she stand up she feels dizzy also. She does not remember how she fell yesterday and she does not remember if she had head trauma. She was sent to the ED for evaluation. PAN CT scan showed a C5 compression deformity. She was admitted for further evaluation.

## 2018-12-05 NOTE — H&P ADULT - ATTENDING COMMENTS
pt seen and examined independently, I have read and agree with above exam and poa  poor historian, ho multiple falls    syncope? Bradycardia? orthostasis    check orthostatics    cardio eval Telemetry  LOOP recorder    neurosuegery eval kyphoplasty?  TSLO brace    pain mgmt    dvt proph    rehab/pt

## 2018-12-05 NOTE — H&P ADULT - PMH
Anemia    Bipolar disorder    COPD (chronic obstructive pulmonary disease)    CVA (cerebral vascular accident)    Glaucoma, unspecified glaucoma type, unspecified laterality    HLD (hyperlipidemia)    Pseudoseizure    Schizoaffective disorder, depressive type    Vertigo

## 2018-12-05 NOTE — CONSULT NOTE ADULT - SUBJECTIVE AND OBJECTIVE BOX
Neurology Consult    CC:  Recurrent Falls (05 Dec 2018 12:24)  HPI:  Patient is a 74 yo right handed F with PMH of schizoaffective d/o, bipolar d/o, hx of pseudoseizures CVA, Vertigo, Glaucoma, DLD presenting with recurrent falls. She reports for the last three weeks she has been falling multiple times mostly when she stands up from the chair or bed or when she picks up objects from the floor. She reports not losing consciousness but before falling she feels dizzy and light headed. Currently whenever she stands up she feels dizzy also. She does not remember how she fell yesterday and she does not remember if she had head trauma. She was sent to the ED for evaluation. PAN CT scan showed a C5 compression deformity. She was admitted for further evaluation. She denies any fever, headaches, SOB or any other complaints at this time.   PAST MEDICAL & SURGICAL HISTORY:  Bipolar disorder  Glaucoma, unspecified glaucoma type, unspecified laterality  Anemia  Schizoaffective disorder, depressive type  Vertigo  Pseudoseizure  COPD (chronic obstructive pulmonary disease)  HLD (hyperlipidemia)  CVA (cerebral vascular accident)  H/O:   History of ankle surgery  History of inguinal herniorrhaphy  FAMILY HISTORY:  Family history of cerebrovascular accident (CVA) (Sibling)  Allergies  Dilantin (Unknown)  Keppra (Unknown)  penicillin (Unknown)  phenobarbital (Unknown)  Tegretol (Unknown)  Intolerances  Topamax (Drowsiness)  MEDICATIONS  (STANDING):  atorvastatin 80 milliGRAM(s) Oral at bedtime  chlorhexidine 4% Liquid 1 Application(s) Topical <User Schedule>  clopidogrel Tablet 75 milliGRAM(s) Oral daily  diVALproex  milliGRAM(s) Oral three times a day  docusate sodium 100 milliGRAM(s) Oral two times a day  enoxaparin Injectable 40 milliGRAM(s) SubCutaneous every 24 hours  famotidine    Tablet 20 milliGRAM(s) Oral two times a day  gabapentin 600 milliGRAM(s) Oral three times a day  ipratropium 17 MICROgram(s) HFA Inhaler 1 Puff(s) Inhalation <User Schedule>  latanoprost 0.005% Ophthalmic Solution 1 Drop(s) Both EYES at bedtime  levothyroxine 50 MICROGram(s) Oral daily  mirtazapine 15 milliGRAM(s) Oral at bedtime  nicotine -  14 mG/24Hr(s) Patch 1 patch Transdermal daily  oxybutynin 5 milliGRAM(s) Oral two times a day  risperiDONE   Tablet 2 milliGRAM(s) Oral at bedtime  MEDICATIONS  (PRN):  acetaminophen   Tablet .. 650 milliGRAM(s) Oral every 6 hours PRN Moderate Pain (4 - 6)  lactulose Syrup 20 Gram(s) Oral daily PRN For constipation  meclizine 25 milliGRAM(s) Oral three times a day PRN Dizziness  oxyCODONE    IR 5 milliGRAM(s) Oral every 6 hours PRN Severe Pain (7 - 10)  Vital Signs Last 24 Hrs  T(C): 36.3 (05 Dec 2018 17:00), Max: 36.6 (05 Dec 2018 00:04)  T(F): 97.3 (05 Dec 2018 17:00), Max: 97.9 (05 Dec 2018 00:04)  HR: 58 (05 Dec 2018 17:00) (56 - 101)  BP: 140/67 (05 Dec 2018 17:00) (122/57 - 158/95)  BP(mean): --  RR: 18 (05 Dec 2018 17:00) (16 - 18)  SpO2: 98% (05 Dec 2018 17:00) (95% - 98%)  Neurologic Examination:  General: Appearance is consistent with chronologic age.  No abnormal facies. The patient is AA&O x 4.  Recent and remote memory intact.    Cranial nerves:  EOMI w/o nystagmus, skew or reported double vision.  PERRL.  No ptosis/weakness of eyelid closure.  Facial sensation L>R.  No facial asymmetry.  Hearing grossly intact b/l.  Palate elevates midline.  Tongue midline.  Motor examination:   Normal tone, bulk and range of motion.  No tenderness, twitching, tremors or involuntary movements.  Formal Muscle Strength Testing: (MRC grade R/L) 5/5 UE; 5/5 LE.  No observable drift.  Reflexes:   2+ b/l  biceps, brachioradialis, patella. Clonus absent.  Sensory examination:   Intact to light touch ( sensation is L>R) and pinprick, pain, temperature and proprioception and vibration in all extremities.  Cerebellum: FTN intact with normal THOMAS in all limbs.  No dysmetria or dysdiadokinesia.    Gait: narrow based and normal.  Labs:   CBC Full  -  ( 05 Dec 2018 15:48 )  WBC Count : 3.51 K/uL  Hemoglobin : 11.0 g/dL  Hematocrit : 32.8 %  Platelet Count - Automated : 68 K/uL  Mean Cell Volume : 91.9 fL  Mean Cell Hemoglobin : 30.8 pg  Mean Cell Hemoglobin Concentration : 33.5 g/dL  Auto Neutrophil # : 1.68 K/uL  Auto Lymphocyte # : 1.15 K/uL  Auto Monocyte # : 0.57 K/uL  Auto Eosinophil # : 0.09 K/uL  Auto Basophil # : 0.01 K/uL  Auto Neutrophil % : 47.8 %  Auto Lymphocyte % : 32.8 %  Auto Monocyte % : 16.2 %  Auto Eosinophil % : 2.6 %  Auto Basophil % : 0.3 %        141  |  101  |  15  ----------------------------<  92  4.5   |  26  |  0.7    Ca    8.7      05 Dec 2018 15:48  Phos  3.3     12-  Mg     1.8     12-    TPro  6.0  /  Alb  3.4<L>  /  TBili  0.3  /  DBili  x   /  AST  17  /  ALT  6   /  AlkPhos  61  12-05    LIVER FUNCTIONS - ( 05 Dec 2018 15:48 )  Alb: 3.4 g/dL / Pro: 6.0 g/dL / ALK PHOS: 61 U/L / ALT: 6 U/L / AST: 17 U/L / GGT: x           PT/INR - ( 04 Dec 2018 19:55 )   PT: 12.10 sec;   INR: 1.12 ratio    PTT - ( 04 Dec 2018 19:55 )  PTT:26.5 sec    Neuroimaging:  EXAM:  CT BRAIN        PROCEDURE DATE:  2018    INTERPRETATION:  Clinical History / Reason for exam: Trauma.    TECHNIQUE: Multiple axial CT images of the head were obtained from the   base of skull to the vertex without the administration of IV contrast.      COMPARISON: CT head of 4/10/2018.      FINDINGS:    The ventricular system, basal cisterns, and sulcal pattern are prominent   and compatible with parenchymal volume loss.    There is periventricular and hemispheric white matter hypoattenuation   compatible with chronic microvascular ischemic changes.     There is no acute extra-axial collection.      No mass effect, midline shift, or hemorrhage is seen.      Gray-white differentiation appears grossly preserved.    There is no depressed skull fracture.     The paranasal sinuses and mastoid air cells are well-aerated.    Atherosclerotic calcifications are noted at the skull base.      IMPRESSION:    No acute intracranial pathology. Neurology Consult    CC:  Recurrent Falls (05 Dec 2018 12:24)  HPI:  Patient is a 74 yo right handed F with PMH of schizoaffective d/o, bipolar d/o, hx of pseudoseizures CVA, Vertigo, Glaucoma, DLD presenting with recurrent falls. She reports for the last three weeks she has been falling multiple times mostly when she stands up from the chair or bed or when she picks up objects from the floor. She reports not losing consciousness but before falling she feels dizzy and light headed. Currently whenever she stands up she feels dizzy also. She does not remember how she fell yesterday and she does not remember if she had head trauma. She was sent to the ED for evaluation. PAN CT scan showed a C5 compression deformity. She was admitted for further evaluation. She denies any fever, headaches, SOB or any other complaints at this time.   PAST MEDICAL & SURGICAL HISTORY:  Bipolar disorder  Glaucoma, unspecified glaucoma type, unspecified laterality  Anemia  Schizoaffective disorder, depressive type  Vertigo  Pseudoseizure  COPD (chronic obstructive pulmonary disease)  HLD (hyperlipidemia)  CVA (cerebral vascular accident)  H/O:   History of ankle surgery  History of inguinal herniorrhaphy  FAMILY HISTORY:  Family history of cerebrovascular accident (CVA) (Sibling)  Allergies  Dilantin (Unknown)  Keppra (Unknown)  penicillin (Unknown)  phenobarbital (Unknown)  Tegretol (Unknown)  Intolerances  Topamax (Drowsiness)  MEDICATIONS  (STANDING):  atorvastatin 80 milliGRAM(s) Oral at bedtime  chlorhexidine 4% Liquid 1 Application(s) Topical <User Schedule>  clopidogrel Tablet 75 milliGRAM(s) Oral daily  diVALproex  milliGRAM(s) Oral three times a day  docusate sodium 100 milliGRAM(s) Oral two times a day  enoxaparin Injectable 40 milliGRAM(s) SubCutaneous every 24 hours  famotidine    Tablet 20 milliGRAM(s) Oral two times a day  gabapentin 600 milliGRAM(s) Oral three times a day  ipratropium 17 MICROgram(s) HFA Inhaler 1 Puff(s) Inhalation <User Schedule>  latanoprost 0.005% Ophthalmic Solution 1 Drop(s) Both EYES at bedtime  levothyroxine 50 MICROGram(s) Oral daily  mirtazapine 15 milliGRAM(s) Oral at bedtime  nicotine -  14 mG/24Hr(s) Patch 1 patch Transdermal daily  oxybutynin 5 milliGRAM(s) Oral two times a day  risperiDONE   Tablet 2 milliGRAM(s) Oral at bedtime  MEDICATIONS  (PRN):  acetaminophen   Tablet .. 650 milliGRAM(s) Oral every 6 hours PRN Moderate Pain (4 - 6)  lactulose Syrup 20 Gram(s) Oral daily PRN For constipation  meclizine 25 milliGRAM(s) Oral three times a day PRN Dizziness  oxyCODONE    IR 5 milliGRAM(s) Oral every 6 hours PRN Severe Pain (7 - 10)  Vital Signs Last 24 Hrs  T(C): 36.3 (05 Dec 2018 17:00), Max: 36.6 (05 Dec 2018 00:04)  T(F): 97.3 (05 Dec 2018 17:00), Max: 97.9 (05 Dec 2018 00:04)  HR: 58 (05 Dec 2018 17:00) (56 - 101)  BP: 140/67 (05 Dec 2018 17:00) (122/57 - 158/95)  BP(mean): --  RR: 18 (05 Dec 2018 17:00) (16 - 18)  SpO2: 98% (05 Dec 2018 17:00) (95% - 98%)  Neurologic Examination:  General: Appearance is consistent with chronologic age.  No abnormal facies. The patient is AA&O x 4.  Recent and remote memory intact.    Cranial nerves: VFF, EOMI w/o nystagmus, skew or reported double vision.  PERRL.  No ptosis/weakness of eyelid closure.  Facial sensation L>R.  No facial asymmetry.  Hearing grossly intact b/l.  Palate elevates midline.  Tongue midline.  Motor examination:   Normal tone, bulk and range of motion.  No tenderness, twitching, tremors or involuntary movements.  Formal Muscle Strength Testing: (MRC grade R/L) 5/5 UE; 5/5 LE.  No observable drift.  Reflexes:   2+ b/l  biceps, brachioradialis, patella. Clonus absent.  Sensory examination:   Intact to light touch ( sensation is L>R) and pinprick, pain, temperature and proprioception and vibration in all extremities.  Cerebellum: FTN intact with normal THOMAS in all limbs.  No dysmetria or dysdiadokinesia.    Gait: Deferred   Labs:   CBC Full  -  ( 05 Dec 2018 15:48 )  WBC Count : 3.51 K/uL  Hemoglobin : 11.0 g/dL  Hematocrit : 32.8 %  Platelet Count - Automated : 68 K/uL  Mean Cell Volume : 91.9 fL  Mean Cell Hemoglobin : 30.8 pg  Mean Cell Hemoglobin Concentration : 33.5 g/dL  Auto Neutrophil # : 1.68 K/uL  Auto Lymphocyte # : 1.15 K/uL  Auto Monocyte # : 0.57 K/uL  Auto Eosinophil # : 0.09 K/uL  Auto Basophil # : 0.01 K/uL  Auto Neutrophil % : 47.8 %  Auto Lymphocyte % : 32.8 %  Auto Monocyte % : 16.2 %  Auto Eosinophil % : 2.6 %  Auto Basophil % : 0.3 %        141  |  101  |  15  ----------------------------<  92  4.5   |  26  |  0.7    Ca    8.7      05 Dec 2018 15:48  Phos  3.3     12-  Mg     1.8     12-    TPro  6.0  /  Alb  3.4<L>  /  TBili  0.3  /  DBili  x   /  AST  17  /  ALT  6   /  AlkPhos  61  12-05    LIVER FUNCTIONS - ( 05 Dec 2018 15:48 )  Alb: 3.4 g/dL / Pro: 6.0 g/dL / ALK PHOS: 61 U/L / ALT: 6 U/L / AST: 17 U/L / GGT: x           PT/INR - ( 04 Dec 2018 19:55 )   PT: 12.10 sec;   INR: 1.12 ratio    PTT - ( 04 Dec 2018 19:55 )  PTT:26.5 sec    Neuroimaging:  EXAM:  CT BRAIN        PROCEDURE DATE:  2018    INTERPRETATION:  Clinical History / Reason for exam: Trauma.    TECHNIQUE: Multiple axial CT images of the head were obtained from the   base of skull to the vertex without the administration of IV contrast.      COMPARISON: CT head of 4/10/2018.      FINDINGS:    The ventricular system, basal cisterns, and sulcal pattern are prominent   and compatible with parenchymal volume loss.    There is periventricular and hemispheric white matter hypoattenuation   compatible with chronic microvascular ischemic changes.     There is no acute extra-axial collection.      No mass effect, midline shift, or hemorrhage is seen.      Gray-white differentiation appears grossly preserved.    There is no depressed skull fracture.     The paranasal sinuses and mastoid air cells are well-aerated.    Atherosclerotic calcifications are noted at the skull base.      IMPRESSION:    No acute intracranial pathology. Neurology Consult    CC:  Recurrent Falls (05 Dec 2018 12:24)  HPI:  Patient is a 72 yo right handed F with PMH of schizoaffective d/o, bipolar d/o, hx of pseudoseizures CVA, Vertigo, Glaucoma, DLD presenting with recurrent falls. She reports for the last three weeks she has been falling multiple times mostly when she stands up from the chair or bed or when she picks up objects from the floor. She reports not losing consciousness but before falling she feels dizzy and light headed. Currently whenever she stands up she feels dizzy also. She does not remember how she fell yesterday and she does not remember if she had head trauma. She was sent to the ED for evaluation. PAN CT scan showed a C5 compression deformity. She was admitted for further evaluation. She denies any fever, headaches, SOB or any other complaints at this time.     Currently c/o LLE pain in the knee limiting ambulation.  Doesn't recall events overnight.  Per housestaff pt "seizure" episode included holding arms out while AOx3 but with jerking movements which was relieved with IVP of normal saline.     PAST MEDICAL & SURGICAL HISTORY:  Bipolar disorder  Glaucoma, unspecified glaucoma type, unspecified laterality  Anemia  Schizoaffective disorder, depressive type  Vertigo  Pseudoseizure  COPD (chronic obstructive pulmonary disease)  HLD (hyperlipidemia)  CVA (cerebral vascular accident)  H/O:   History of ankle surgery  History of inguinal herniorrhaphy  FAMILY HISTORY:  Family history of cerebrovascular accident (CVA) (Sibling)  Allergies  Dilantin (Unknown)  Keppra (Unknown)  penicillin (Unknown)  phenobarbital (Unknown)  Tegretol (Unknown)  Intolerances  Topamax (Drowsiness)  MEDICATIONS  (STANDING):  atorvastatin 80 milliGRAM(s) Oral at bedtime  chlorhexidine 4% Liquid 1 Application(s) Topical <User Schedule>  clopidogrel Tablet 75 milliGRAM(s) Oral daily  diVALproex  milliGRAM(s) Oral three times a day  docusate sodium 100 milliGRAM(s) Oral two times a day  enoxaparin Injectable 40 milliGRAM(s) SubCutaneous every 24 hours  famotidine    Tablet 20 milliGRAM(s) Oral two times a day  gabapentin 600 milliGRAM(s) Oral three times a day  ipratropium 17 MICROgram(s) HFA Inhaler 1 Puff(s) Inhalation <User Schedule>  latanoprost 0.005% Ophthalmic Solution 1 Drop(s) Both EYES at bedtime  levothyroxine 50 MICROGram(s) Oral daily  mirtazapine 15 milliGRAM(s) Oral at bedtime  nicotine -  14 mG/24Hr(s) Patch 1 patch Transdermal daily  oxybutynin 5 milliGRAM(s) Oral two times a day  risperiDONE   Tablet 2 milliGRAM(s) Oral at bedtime  MEDICATIONS  (PRN):  acetaminophen   Tablet .. 650 milliGRAM(s) Oral every 6 hours PRN Moderate Pain (4 - 6)  lactulose Syrup 20 Gram(s) Oral daily PRN For constipation  meclizine 25 milliGRAM(s) Oral three times a day PRN Dizziness  oxyCODONE    IR 5 milliGRAM(s) Oral every 6 hours PRN Severe Pain (7 - 10)  Vital Signs Last 24 Hrs  T(C): 36.3 (05 Dec 2018 17:00), Max: 36.6 (05 Dec 2018 00:04)  T(F): 97.3 (05 Dec 2018 17:00), Max: 97.9 (05 Dec 2018 00:04)  HR: 58 (05 Dec 2018 17:00) (56 - 101)  BP: 140/67 (05 Dec 2018 17:00) (122/57 - 158/95)  BP(mean): --  RR: 18 (05 Dec 2018 17:00) (16 - 18)  SpO2: 98% (05 Dec 2018 17:00) (95% - 98%)  Neurologic Examination:  General: Appearance is consistent with chronologic age.  No abnormal facies. The patient is AA&O x 4.  Recent and remote memory intact.    Cranial nerves: VFF, EOMI w/o nystagmus, skew or reported double vision.  PERRL.  No ptosis/weakness of eyelid closure.  Facial sensation L>R.  No facial asymmetry.  Hearing grossly intact b/l.  Palate elevates midline.  Tongue midline.  Motor examination:   Normal tone, bulk and range of motion.  No tenderness, twitching, tremors or involuntary movements.  Formal Muscle Strength Testing: (MRC grade R/L) 5/5 UE; 5/5 LE.  No observable drift.  Reflexes:   2+ b/l  biceps, brachioradialis, patella. Clonus absent.  Sensory examination:   Intact to light touch ( sensation is L>R) and pinprick, pain, temperature and proprioception and vibration in all extremities.  Cerebellum: FTN intact with normal THOMAS in all limbs.  No dysmetria or dysdiadokinesia.    Gait: Deferred   Labs:   CBC Full  -  ( 05 Dec 2018 15:48 )  WBC Count : 3.51 K/uL  Hemoglobin : 11.0 g/dL  Hematocrit : 32.8 %  Platelet Count - Automated : 68 K/uL  Mean Cell Volume : 91.9 fL  Mean Cell Hemoglobin : 30.8 pg  Mean Cell Hemoglobin Concentration : 33.5 g/dL  Auto Neutrophil # : 1.68 K/uL  Auto Lymphocyte # : 1.15 K/uL  Auto Monocyte # : 0.57 K/uL  Auto Eosinophil # : 0.09 K/uL  Auto Basophil # : 0.01 K/uL  Auto Neutrophil % : 47.8 %  Auto Lymphocyte % : 32.8 %  Auto Monocyte % : 16.2 %  Auto Eosinophil % : 2.6 %  Auto Basophil % : 0.3 %    12    141  |  101  |  15  ----------------------------<  92  4.5   |  26  |  0.7    Ca    8.7      05 Dec 2018 15:48  Phos  3.3     12-05  Mg     1.8     12-05    TPro  6.0  /  Alb  3.4<L>  /  TBili  0.3  /  DBili  x   /  AST  17  /  ALT  6   /  AlkPhos  61  12-05    LIVER FUNCTIONS - ( 05 Dec 2018 15:48 )  Alb: 3.4 g/dL / Pro: 6.0 g/dL / ALK PHOS: 61 U/L / ALT: 6 U/L / AST: 17 U/L / GGT: x           PT/INR - ( 04 Dec 2018 19:55 )   PT: 12.10 sec;   INR: 1.12 ratio    PTT - ( 04 Dec 2018 19:55 )  PTT:26.5 sec    Neuroimaging:  EXAM:  CT BRAIN        PROCEDURE DATE:  2018    INTERPRETATION:  Clinical History / Reason for exam: Trauma.    TECHNIQUE: Multiple axial CT images of the head were obtained from the   base of skull to the vertex without the administration of IV contrast.      COMPARISON: CT head of 4/10/2018.      FINDINGS:    The ventricular system, basal cisterns, and sulcal pattern are prominent   and compatible with parenchymal volume loss.    There is periventricular and hemispheric white matter hypoattenuation   compatible with chronic microvascular ischemic changes.     There is no acute extra-axial collection.      No mass effect, midline shift, or hemorrhage is seen.      Gray-white differentiation appears grossly preserved.    There is no depressed skull fracture.     The paranasal sinuses and mastoid air cells are well-aerated.    Atherosclerotic calcifications are noted at the skull base.      IMPRESSION:    No acute intracranial pathology.

## 2018-12-05 NOTE — H&P ADULT - NSHPLABSRESULTS_GEN_ALL_CORE
11.4   3.34  )-----------( 70       ( 04 Dec 2018 19:55 )             34.5     12-04    146  |  107  |  19  ----------------------------<  89  4.0   |  27  |  0.8    Ca    9.3      04 Dec 2018 19:55    TPro  6.5  /  Alb  3.8  /  TBili  0.3  /  DBili  x   /  AST  17  /  ALT  6   /  AlkPhos  72  12-04    < from: CT Chest w/ IV Cont (12.04.18 @ 22:37) >    Age-indeterminate T5 mild compression deformity, correlate for point   tenderness.  Otherwise, no evidence of acute traumatic pathology within the chest,   abdomen or pelvis.    < from: CT Abdomen and Pelvis w/ IV Cont (12.04.18 @ 22:37) >    Age-indeterminate T5 mild compression deformity, correlate for point   tenderness.  Otherwise, no evidence of acute traumatic pathology within the chest,   abdomen or pelvis.    < from: CT Head No Cont (12.04.18 @ 22:36) >    No acute intracranial pathology.    < end of copied text >    < from: CT Cervical Spine No Cont (12.04.18 @ 22:36) >    No evidence of a cervical spine fracture or subluxation.

## 2018-12-05 NOTE — CHART NOTE - NSCHARTNOTEFT_GEN_A_CORE
ON CALL RESIDENT EVENT NOTE:    Called by RN for patient RADHA ROMERO. RN reported patient is seizing.  RAPID RESPONSE CALLED FOR PATIENT.      Last charted Vitals:  T(F): 97 (12-05-18 @ 16:00)  HR: 56 (12-05-18 @ 16:00)  BP: 137/72 (12-05-18 @ 16:00)  RR: 18 (12-05-18 @ 16:00)  SpO2: 96% (12-05-18 @ 00:04)  CVP(mm Hg): --  CO: --  CI: --  PA: --    Physical Exam:  Gen- NAD, ncat  Cardio - s+1,s+2, rrr, no murmur  Lung - cta b/l, no wheeze, no rhonchi, no rales   Abdomen- +BS, NT/ND, no guarding, no rebound, no masses  Ext- no edema, 2+ pulses b/l  Neuro- CN grossly intact, strength 5/5 b/l extremities    LABS:                        11.4   3.34  )-----------( 70       ( 04 Dec 2018 19:55 )             34.5     12-05    141  |  101  |  15  ----------------------------<  92  4.5   |  26  |  0.7    Ca    8.7      05 Dec 2018 15:48  Phos  3.3     12-05  Mg     1.8     12-05    TPro  6.0  /  Alb  3.4<L>  /  TBili  0.3  /  DBili  x   /  AST  17  /  ALT  6   /  AlkPhos  61  12-05    PT/INR - ( 04 Dec 2018 19:55 )   PT: 12.10 sec;   INR: 1.12 ratio         PTT - ( 04 Dec 2018 19:55 )  PTT:26.5 sec      CARDIAC MARKERS ( 05 Dec 2018 15:48 )  x     / <0.01 ng/mL / x     / x     / x            Assessment/Plan:    Possible seizure 2/2 unknown etiology currently, Rapid response called because patient was found brenda her arms and flexing her wrists.  During episode patient unable to answer questions and was looking around room.  AFter sternal rub patient retracted from pain and answered that she was at a hospital.  Patient had no pre or post ictal symptoms.  - Pt admitted originally to , however patient was going to be sent to Tele prior to rapid   - NEuro evaluation showed no deficits in motor or sensory findings different from original exam on admission  - EEG ordered, neurology consulted, no medication given, vitals remained stable, CBC/BMP/CArdiac enzymes drawn, EKG showed only pac with no other acute findings.  - follow up lab work as well @ 4am draw  -

## 2018-12-05 NOTE — CONSULT NOTE ADULT - ASSESSMENT
IMPRESSION: Rehab of back pain / T5 comp fx    PRECAUTIONS: [  ] Cardiac  [  ] Respiratory  [  ] Seizures [  ] Contact Isolation  [  ] Droplet Isolation  [  ] Other    Weight Bearing Status:     RECOMMENDATION:    Out of Bed to Chair     DVT/Decubiti Prophylaxis    REHAB PLAN:     [ x  ] Bedside P/T 3-5 times a week   [   ]   Bedside O/T  2-3 times a week             [   ] No Rehab Therapy Indicated                   [   ]  Speech Therapy   Conditioning/ROM                                    ADL  Bed Mobility                                               Conditioning/ROM  Transfers                                                     Bed Mobility  Sitting /Standing Balance                         Transfers                                        Gait Training                                               Sitting/Standing Balance  Stair Training [   ]Applicable                    Home equipment Eval                                                                        Splinting  [   ] Only      GOALS:   ADL   [   ]   Independent                    Transfers  [ x  ] Independent                          Ambulation  [ x  ] Independent     [ x   ] With device                            [   ]  CG                                                         [   ]  CG                                                                  [   ] CG                            [    ] Min A                                                   [   ] Min A                                                              [   ] Min  A          DISCHARGE PLAN:   [   ]  Good candidate for Intensive Rehabilitation/Hospital based                                             Will tolerate 3hrs Intensive Rehab Daily                                       [ x   ]  Short Term Rehab in Skilled Nursing Facility                             vs          [ x   ]  Home with Outpatient or VN services                                         [    ]  Possible Candidate for Intensive Hospital based Rehab

## 2018-12-05 NOTE — CHART NOTE - NSCHARTNOTEFT_GEN_A_CORE
Rapid response was called on the patient, after the phlebotomist found the patient to be Rapid response was called on the patient, after the phlebotomist found the patient to be non - responsive/ seizure like activity.    Went in to assess the patient. Stat vitals were T- 97F, HR 58, RR 18, /6,  and Pulse ox 98 on Room air.   Pt neurological exam was grossly normal, no deficits.  Stat labs- cbc, cmp, lactate, CE were drawn and Ct scan ordered.   Pt became responsive during the rapid and was conversing, awake and alert.     Of note, Pt was a rapid earlier in the day with similar presentation and was seen by neurology after that- likely pseudoseizure. Please see rapid response note.    Plan:  - f/u Labs  - f/u EEG  - fu CT scan head.

## 2018-12-05 NOTE — H&P ADULT - NSHPPHYSICALEXAM_GEN_ALL_CORE
NAD, poor history  right chest ecchymosis  Mid spinal thoracic tenderness   Irregular RR, no murmurs  Soft non tender abdomen  no LE edema

## 2018-12-05 NOTE — CHART NOTE - NSCHARTNOTEFT_GEN_A_CORE
Patient was admitted to the hospital for multiple falls, she was initially admitted to medicine, but on admission, an order was put in to transfer to telemetry so a full syncope work up could be completed.   In the interm of waiting for a bed on tele, a rapid response was called for "seizure like activity", most like pseudoseizure. Please see Rapid response note for more details.     Stat LABS and EEG were ordered.   EKG at the time of the rapid showed PAC, but no changes from earlier EKG.   Neurology consult is pending, had been placed early on in the day.     Assessment:  ·Recurrent falls possible syncope  ·T5 compression deformity on CT  ·Possible orthostasis  ·Irregular heart rate: Sinus bradycardia with PACs  ·Polypharmacy: multiple sedatives and anticholinergics  ·Possible Arrythmia/ Seizure  ·Bipolar d/o. Schizoaffective/ pseudoseizures/ hx of CVA/ Glaucoma/ COPD    Plan:  ·On tele now  FU Neurology eval.  FU EEG  FU Keppra level.   Seizure and fall precautions  ·Syncope Work up if orthostatics negative  ·consider EP for loop recorder  ·F/U Neurosurgery. Might need MRI of spine.   TLSO brace ordered  Pain control  ·Continue home meds  ·DVT ppx  ·From assisted living. Asses for living conditions safety. Social service referral    To DO overnight  - FU Stat afternoon labs, looking for electrolyte abnormalities  - FU EEG  - Monitor patient for seizure activity    Verbally signed out to Dr. Harris, intern covering 3C, at [id number]

## 2018-12-05 NOTE — CONSULT NOTE ADULT - ASSESSMENT
74 y/o female admitted for recurrent falls. Patient, per medicine note, she was initially admitted to medicine unit, but on admission an order was put in to transfer to telemetry so a full syncope work up could be completed.   In the interm of waiting for a bed on tele, a rapid response was called for "seizure like activity", most like pseudoseizure. On Neuro exam there was no focal deficts with the exception of decreased sensation on left side of te body (which the patient attributes to her previous stroke and states that this is her baseline).     Plan:   - VEEG in the morning   - Ativan if seizure lasts more than 10 minutes     Neuro Attending note will follow 72 y/o female admitted for recurrent falls. Patient, per medicine note, she was initially admitted to medicine unit, but on admission an order was put in to transfer to telemetry so a full syncope work up could be completed.  In the interm of waiting for a bed on tele, a rapid response was called for "seizure like activity", most like pseudoseizure. On Neuro exam there was no focal deficts with the exception of decreased sensation on left side of te body (which the patient attributes to her previous stroke and states that this is her baseline).     Plan:   - VEEG in the morning   - Ativan if seizure lasts more than 10 minutes     Neuro Attending note will follow

## 2018-12-05 NOTE — CONSULT NOTE ADULT - ASSESSMENT
74 yo F with multiple falls, multiple comorbidities  - age-indetermined (subacute vs chronic) mild T5 compression fracture    Plan  NSG - discussed and fracture appears to be subacute vs chronic on CT, TLSO brace in am vs MRI if pain persists  Cleared from trauma  Discussed with Dr. Brannon and ED

## 2018-12-06 LAB
ALBUMIN SERPL ELPH-MCNC: 3.4 G/DL — LOW (ref 3.5–5.2)
ALP SERPL-CCNC: 63 U/L — SIGNIFICANT CHANGE UP (ref 30–115)
ALT FLD-CCNC: 6 U/L — SIGNIFICANT CHANGE UP (ref 0–41)
ANION GAP SERPL CALC-SCNC: 12 MMOL/L — SIGNIFICANT CHANGE UP (ref 7–14)
APTT BLD: 32.5 SEC — SIGNIFICANT CHANGE UP (ref 27–39.2)
AST SERPL-CCNC: 15 U/L — SIGNIFICANT CHANGE UP (ref 0–41)
BASOPHILS # BLD AUTO: 0.01 K/UL — SIGNIFICANT CHANGE UP (ref 0–0.2)
BASOPHILS NFR BLD AUTO: 0.3 % — SIGNIFICANT CHANGE UP (ref 0–1)
BILIRUB SERPL-MCNC: 0.4 MG/DL — SIGNIFICANT CHANGE UP (ref 0.2–1.2)
BLD GP AB SCN SERPL QL: SIGNIFICANT CHANGE UP
BUN SERPL-MCNC: 10 MG/DL — SIGNIFICANT CHANGE UP (ref 10–20)
CALCIUM SERPL-MCNC: 8.9 MG/DL — SIGNIFICANT CHANGE UP (ref 8.5–10.1)
CHLORIDE SERPL-SCNC: 103 MMOL/L — SIGNIFICANT CHANGE UP (ref 98–110)
CK MB CFR SERPL CALC: 2 NG/ML — SIGNIFICANT CHANGE UP (ref 0.6–6.3)
CK SERPL-CCNC: 82 U/L — SIGNIFICANT CHANGE UP (ref 0–225)
CO2 SERPL-SCNC: 29 MMOL/L — SIGNIFICANT CHANGE UP (ref 17–32)
CREAT SERPL-MCNC: 0.7 MG/DL — SIGNIFICANT CHANGE UP (ref 0.7–1.5)
EOSINOPHIL # BLD AUTO: 0.1 K/UL — SIGNIFICANT CHANGE UP (ref 0–0.7)
EOSINOPHIL NFR BLD AUTO: 3 % — SIGNIFICANT CHANGE UP (ref 0–8)
GLUCOSE SERPL-MCNC: 88 MG/DL — SIGNIFICANT CHANGE UP (ref 70–99)
HCT VFR BLD CALC: 34.8 % — LOW (ref 37–47)
HGB BLD-MCNC: 11.6 G/DL — LOW (ref 12–16)
IMM GRANULOCYTES NFR BLD AUTO: 0.3 % — SIGNIFICANT CHANGE UP (ref 0.1–0.3)
INR BLD: 1.08 RATIO — SIGNIFICANT CHANGE UP (ref 0.65–1.3)
LYMPHOCYTES # BLD AUTO: 1.15 K/UL — LOW (ref 1.2–3.4)
LYMPHOCYTES # BLD AUTO: 34.2 % — SIGNIFICANT CHANGE UP (ref 20.5–51.1)
MAGNESIUM SERPL-MCNC: 1.8 MG/DL — SIGNIFICANT CHANGE UP (ref 1.8–2.4)
MCHC RBC-ENTMCNC: 30.8 PG — SIGNIFICANT CHANGE UP (ref 27–31)
MCHC RBC-ENTMCNC: 33.3 G/DL — SIGNIFICANT CHANGE UP (ref 32–37)
MCV RBC AUTO: 92.3 FL — SIGNIFICANT CHANGE UP (ref 81–99)
MONOCYTES # BLD AUTO: 0.46 K/UL — SIGNIFICANT CHANGE UP (ref 0.1–0.6)
MONOCYTES NFR BLD AUTO: 13.7 % — HIGH (ref 1.7–9.3)
NEUTROPHILS # BLD AUTO: 1.63 K/UL — SIGNIFICANT CHANGE UP (ref 1.4–6.5)
NEUTROPHILS NFR BLD AUTO: 48.5 % — SIGNIFICANT CHANGE UP (ref 42.2–75.2)
PLATELET # BLD AUTO: 61 K/UL — LOW (ref 130–400)
POTASSIUM SERPL-MCNC: 3.9 MMOL/L — SIGNIFICANT CHANGE UP (ref 3.5–5)
POTASSIUM SERPL-SCNC: 3.9 MMOL/L — SIGNIFICANT CHANGE UP (ref 3.5–5)
PROLACTIN SERPL-MCNC: 107.1 NG/ML — HIGH (ref 3.4–24.1)
PROT SERPL-MCNC: 6.1 G/DL — SIGNIFICANT CHANGE UP (ref 6–8)
PROTHROM AB SERPL-ACNC: 12.4 SEC — SIGNIFICANT CHANGE UP (ref 9.95–12.87)
RBC # BLD: 3.77 M/UL — LOW (ref 4.2–5.4)
RBC # FLD: 14.2 % — SIGNIFICANT CHANGE UP (ref 11.5–14.5)
SODIUM SERPL-SCNC: 144 MMOL/L — SIGNIFICANT CHANGE UP (ref 135–146)
TROPONIN T SERPL-MCNC: <0.01 NG/ML — SIGNIFICANT CHANGE UP
TYPE + AB SCN PNL BLD: SIGNIFICANT CHANGE UP
WBC # BLD: 3.36 K/UL — LOW (ref 4.8–10.8)
WBC # FLD AUTO: 3.36 K/UL — LOW (ref 4.8–10.8)

## 2018-12-06 RX ORDER — MAGNESIUM OXIDE 400 MG ORAL TABLET 241.3 MG
400 TABLET ORAL
Qty: 0 | Refills: 0 | Status: DISCONTINUED | OUTPATIENT
Start: 2018-12-06 | End: 2018-12-10

## 2018-12-06 RX ORDER — MAGNESIUM SULFATE 500 MG/ML
1 VIAL (ML) INJECTION ONCE
Qty: 0 | Refills: 0 | Status: COMPLETED | OUTPATIENT
Start: 2018-12-06 | End: 2018-12-06

## 2018-12-06 RX ADMIN — Medication 5 MILLIGRAM(S): at 17:38

## 2018-12-06 RX ADMIN — GABAPENTIN 600 MILLIGRAM(S): 400 CAPSULE ORAL at 13:27

## 2018-12-06 RX ADMIN — CHLORHEXIDINE GLUCONATE 1 APPLICATION(S): 213 SOLUTION TOPICAL at 06:06

## 2018-12-06 RX ADMIN — Medication 1 PATCH: at 12:30

## 2018-12-06 RX ADMIN — Medication 1 PUFF(S): at 21:47

## 2018-12-06 RX ADMIN — RISPERIDONE 2 MILLIGRAM(S): 4 TABLET ORAL at 21:47

## 2018-12-06 RX ADMIN — Medication 50 MICROGRAM(S): at 06:06

## 2018-12-06 RX ADMIN — Medication 1 PATCH: at 12:34

## 2018-12-06 RX ADMIN — ATORVASTATIN CALCIUM 80 MILLIGRAM(S): 80 TABLET, FILM COATED ORAL at 21:47

## 2018-12-06 RX ADMIN — MAGNESIUM OXIDE 400 MG ORAL TABLET 400 MILLIGRAM(S): 241.3 TABLET ORAL at 17:38

## 2018-12-06 RX ADMIN — DIVALPROEX SODIUM 500 MILLIGRAM(S): 500 TABLET, DELAYED RELEASE ORAL at 21:47

## 2018-12-06 RX ADMIN — CLOPIDOGREL BISULFATE 75 MILLIGRAM(S): 75 TABLET, FILM COATED ORAL at 12:33

## 2018-12-06 RX ADMIN — MIRTAZAPINE 15 MILLIGRAM(S): 45 TABLET, ORALLY DISINTEGRATING ORAL at 21:47

## 2018-12-06 RX ADMIN — Medication 100 MILLIGRAM(S): at 06:06

## 2018-12-06 RX ADMIN — Medication 1 PATCH: at 11:28

## 2018-12-06 RX ADMIN — LATANOPROST 1 DROP(S): 0.05 SOLUTION/ DROPS OPHTHALMIC; TOPICAL at 21:48

## 2018-12-06 RX ADMIN — FAMOTIDINE 20 MILLIGRAM(S): 10 INJECTION INTRAVENOUS at 06:06

## 2018-12-06 RX ADMIN — DIVALPROEX SODIUM 500 MILLIGRAM(S): 500 TABLET, DELAYED RELEASE ORAL at 06:06

## 2018-12-06 RX ADMIN — ENOXAPARIN SODIUM 40 MILLIGRAM(S): 100 INJECTION SUBCUTANEOUS at 12:33

## 2018-12-06 RX ADMIN — GABAPENTIN 600 MILLIGRAM(S): 400 CAPSULE ORAL at 21:47

## 2018-12-06 RX ADMIN — FAMOTIDINE 20 MILLIGRAM(S): 10 INJECTION INTRAVENOUS at 17:38

## 2018-12-06 RX ADMIN — GABAPENTIN 600 MILLIGRAM(S): 400 CAPSULE ORAL at 06:06

## 2018-12-06 RX ADMIN — DIVALPROEX SODIUM 500 MILLIGRAM(S): 500 TABLET, DELAYED RELEASE ORAL at 13:27

## 2018-12-06 RX ADMIN — Medication 5 MILLIGRAM(S): at 06:06

## 2018-12-06 NOTE — PROGRESS NOTE ADULT - ASSESSMENT
·syncope  undergoing videoeeg  FU Neurology eval.  FU EEG  FU Keppra level.   Seizure and fall precautions  ·Syncope Work up if orthostatics negative  ·consider EP for loop recorder  ·F/U Neurosurgery. Might need MRI of spine.   TLSO brace ordered  Pain control  ·Continue home med  check valproate level    ·DVT ppx

## 2018-12-06 NOTE — CONSULT NOTE ADULT - ASSESSMENT
Seizure  - Being followed by neurology currently connected to EEG monitor  - Will place Loop recorder implant if recommended by neurology  - Continue current medical management Recurrent falls at nursing home  un responsive episode witnessed in hospital amanda pt was lying in bed with no change in BP or associated arrhythmia on telemetry could be dur to excess sedative and tranquilizers  telemetry shows asymptomatic frequent PACS single and pairs also multifocal most likely due to COPD\  No significant  rocio arrhythmia  recorded     Recommend  ABG  FOR O2 sats correct hypoxemia as the cause  for AT  CONSIDER CARdIAZEM 30 MG Q 8 HRS for treatment of PACS  NO NEED FOR PACE MAKER AT PRESENT                                              -

## 2018-12-06 NOTE — PROGRESS NOTE ADULT - SUBJECTIVE AND OBJECTIVE BOX
Patient was seen and examined. Spoke with RN. Chart reviewed.    No events overnight.  Vital Signs Last 24 Hrs  T(F): 96.2 (06 Dec 2018 06:05), Max: 97.3 (05 Dec 2018 17:00)  HR: 87 (06 Dec 2018 06:05) (56 - 87)  BP: 119/58 (06 Dec 2018 06:05) (119/58 - 140/67)  SpO2: 97% (06 Dec 2018 09:04) (97% - 98%)  MEDICATIONS  (STANDING):  atorvastatin 80 milliGRAM(s) Oral at bedtime  chlorhexidine 4% Liquid 1 Application(s) Topical <User Schedule>  clopidogrel Tablet 75 milliGRAM(s) Oral daily  diVALproex  milliGRAM(s) Oral three times a day  docusate sodium 100 milliGRAM(s) Oral two times a day  enoxaparin Injectable 40 milliGRAM(s) SubCutaneous every 24 hours  famotidine    Tablet 20 milliGRAM(s) Oral two times a day  gabapentin 600 milliGRAM(s) Oral three times a day  ipratropium 17 MICROgram(s) HFA Inhaler 1 Puff(s) Inhalation <User Schedule>  latanoprost 0.005% Ophthalmic Solution 1 Drop(s) Both EYES at bedtime  levothyroxine 50 MICROGram(s) Oral daily  mirtazapine 15 milliGRAM(s) Oral at bedtime  nicotine -  14 mG/24Hr(s) Patch 1 patch Transdermal daily  oxybutynin 5 milliGRAM(s) Oral two times a day  risperiDONE   Tablet 2 milliGRAM(s) Oral at bedtime    MEDICATIONS  (PRN):  acetaminophen   Tablet .. 650 milliGRAM(s) Oral every 6 hours PRN Moderate Pain (4 - 6)  lactulose Syrup 20 Gram(s) Oral daily PRN For constipation  meclizine 25 milliGRAM(s) Oral three times a day PRN Dizziness  oxyCODONE    IR 5 milliGRAM(s) Oral every 6 hours PRN Severe Pain (7 - 10)    Labs:                        11.6   3.36  )-----------( 61       ( 06 Dec 2018 07:39 )             34.8                         10.6   4.20  )-----------( 65       ( 05 Dec 2018 22:26 )             31.5     06 Dec 2018 07:39    144    |  103    |  10     ----------------------------<  88     3.9     |  29     |  0.7    05 Dec 2018 22:26    141    |  102    |  12     ----------------------------<  101    3.8     |  28     |  0.7      Ca    8.9        06 Dec 2018 07:39  Ca    8.5        05 Dec 2018 22:26  Phos  2.9       05 Dec 2018 22:26  Phos  3.3       05 Dec 2018 15:48  Mg     1.8       06 Dec 2018 07:39  Mg     1.7       05 Dec 2018 22:26    TPro  6.1    /  Alb  3.4    /  TBili  0.4    /  DBili  x      /  AST  15     /  ALT  6      /  AlkPhos  63     06 Dec 2018 07:39  TPro  5.7    /  Alb  3.3    /  TBili  0.2    /  DBili  x      /  AST  15     /  ALT  5      /  AlkPhos  59     05 Dec 2018 22:26    PT/INR - ( 06 Dec 2018 07:39 )   PT: 12.40 sec;   INR: 1.08 ratio         PTT - ( 06 Dec 2018 07:39 )  PTT:32.5 sec        Radiology:    General: comfortable, NAD  Neurology: A&Ox3, nonfocal  Head:  Normocephalic, atraumatic  ENT:  Mucosa moist, no ulcerations  Neck:  Supple, no JVD,   Resp: CTA B/L  CV: RRR, S1S2,   GI: Soft, NT, bowel sounds  MS: No edema, + peripheral pulses, FROM all 4 extremity

## 2018-12-06 NOTE — CONSULT NOTE ADULT - SUBJECTIVE AND OBJECTIVE BOX
HPI:  74 yo F with PMH of schizoaffective d/o, bipolar d/o, hx of pseudoseizures CVA, Vertigo, Glaucoma, DLD presenting with above chief complaint. She reports for the last three weeks she has been falling multiple times mostly when she stands up from the chair or bed or when she picks up objects from the floor. She reports not losing consciousness but before falling she feels dizzy and light headed. Currently whenever she stand up she feels dizzy also. She does not remember how she fell yesterday and she does not remember if she had head trauma. She was sent to the ED for evaluation. PAN CT scan showed a C5 compression deformity. She was admitted for further evaluation. (05 Dec 2018 10:59)    Patient is a 73y old  Female who presents with a chief complaint of Recurrent Falls (06 Dec 2018 12:45)      PAST MEDICAL & SURGICAL HISTORY:  Bipolar disorder  Glaucoma, unspecified glaucoma type, unspecified laterality  Anemia  Schizoaffective disorder, depressive type  Vertigo  Pseudoseizure  COPD (chronic obstructive pulmonary disease)  HLD (hyperlipidemia)  CVA (cerebral vascular accident)  H/O:   History of ankle surgery  History of inguinal herniorrhaphy    PREVIOUS DIAGNOSTIC TESTING:      EXAM:  CT BRAIN        PROCEDURE DATE:  2018    INTERPRETATION:  Clinical History / Reason for exam: Slurred speech,   seizure like activity.  TECHNIQUE: Multiple axial CT images of the head were obtained from the   base of skull to the vertex without the administration of IV contrast.  COMPARISON: CT head of 2018.  FINDINGS:  The ventricular system, basal cisterns, and sulcal pattern are prominent   and  compatible with parenchymal volume loss.  There is periventricular and hemispheric white matter hypoattenuation  compatible with chronic microvascular ischemic changes.  There is no acute extra-axial collection.  No mass effect, midline shift, or hemorrhage is seen.  Gray-white differentiation appears grossly preserved.  There is no depressed skull fracture.  The paranasal sinuses and mastoid air cells are well-aerated.  Atherosclerotic calcifications are noted at the skull base.  IMPRESSION:  No acute intracranial pathology. No change since the prior exam. If   symptoms persist, MRI may be of benefit.    EXAM:  EEG-AWAKE AND DROWSY    PROCEDURE DATE:  2018    Impression  Normal    MEDICATIONS  (STANDING):  atorvastatin 80 milliGRAM(s) Oral at bedtime  chlorhexidine 4% Liquid 1 Application(s) Topical <User Schedule>  clopidogrel Tablet 75 milliGRAM(s) Oral daily  diVALproex  milliGRAM(s) Oral three times a day  docusate sodium 100 milliGRAM(s) Oral two times a day  enoxaparin Injectable 40 milliGRAM(s) SubCutaneous every 24 hours  famotidine    Tablet 20 milliGRAM(s) Oral two times a day  gabapentin 600 milliGRAM(s) Oral three times a day  ipratropium 17 MICROgram(s) HFA Inhaler 1 Puff(s) Inhalation <User Schedule>  latanoprost 0.005% Ophthalmic Solution 1 Drop(s) Both EYES at bedtime  levothyroxine 50 MICROGram(s) Oral daily  magnesium oxide 400 milliGRAM(s) Oral three times a day with meals  mirtazapine 15 milliGRAM(s) Oral at bedtime  nicotine -  14 mG/24Hr(s) Patch 1 patch Transdermal daily  oxybutynin 5 milliGRAM(s) Oral two times a day  risperiDONE   Tablet 2 milliGRAM(s) Oral at bedtime    MEDICATIONS  (PRN):  acetaminophen   Tablet .. 650 milliGRAM(s) Oral every 6 hours PRN Moderate Pain (4 - 6)  lactulose Syrup 20 Gram(s) Oral daily PRN For constipation  meclizine 25 milliGRAM(s) Oral three times a day PRN Dizziness  oxyCODONE    IR 5 milliGRAM(s) Oral every 6 hours PRN Severe Pain (7 - 10)   Home Medications:  Atrovent 18 mcg/inh inhalation aerosol: 2 puff(s) inhaled 2 times a day (05 Dec 2018 11:12)  Colace 100 mg oral capsule: 1 cap(s) orally once a day (05 Dec 2018 11:12)  Depakote 500 mg oral delayed release tablet: 1 tab(s) orally 3 times a day (05 Dec 2018 11:12)  gabapentin 600 mg oral tablet: 1 tab(s) orally 3 times a day (05 Dec 2018 11:12)  lactulose 10 g/15 mL oral solution: 30 milliliter(s) orally once a day, As Needed (05 Dec 2018 11:12)  Lipitor 80 mg oral tablet:  (05 Dec 2018 11:12)  meclizine 25 mg oral tablet: 1 tab(s) orally 3 times a day, As Needed (05 Dec 2018 11:12)  mirtazapine 15 mg oral tablet: 1 tab(s) orally once a day (at bedtime) (05 Dec 2018 11:12)  Plavix 75 mg oral tablet: 1 tab(s) orally once a day (05 Dec 2018 11:12)  RisperDAL 2 mg oral tablet: 1 tab(s) orally once a day (at bedtime) (05 Dec 2018 11:12)  Synthroid 50 mcg (0.05 mg) oral tablet: 1 tab(s) orally once a day (05 Dec 2018 11:12)  VESIcare 10 mg oral tablet: 1 tab(s) orally once a day (05 Dec 2018 11:12)  Xalatan 0.005% ophthalmic solution: 1 drop(s) to each affected eye once a day (in the evening) (05 Dec 2018 11:12)    FAMILY HISTORY:  Family history of cerebrovascular accident (CVA) (Sibling)    SOCIAL HISTORY:  CIGARETTES: denies  ALCOHOL: denies    Vital Signs Last 24 Hrs  T(C): 35.4 (06 Dec 2018 15:11), Max: 36.2 (05 Dec 2018 20:53)  T(F): 95.7 (06 Dec 2018 15:11), Max: 97.2 (05 Dec 2018 20:53)  HR: 90 (06 Dec 2018 15:11) (60 - 90)  BP: 118/63 (06 Dec 2018 15:11) (118/63 - 120/56)  BP(mean): --  RR: 18 (06 Dec 2018 15:11) (18 - 18)  SpO2: 97% (06 Dec 2018 09:04) (97% - 97%)    INTERPRETATION OF TELEMETRY: Sinus Tachycardia with PAC's    ECG: < from: 12 Lead ECG (18 @ 22:30) >  entricular Rate 102 BPM  Atrial Rate 117 BPM  QRS Duration 82 ms  Q-T Interval 356 ms  QTC Calculation(Bezet) 463 ms  R Axis 9 degrees  T Axis 73 degrees  Diagnosis Line Sinus rhythm with frequent Premature atrial complexes  Nonspecific ST abnormality  Abnormal ECG  < end of copied text >    LABS:                      11.6   3.36  )-----------( 61       ( 06 Dec 2018 07:39 )             34.8     12-06    144  |  103  |  10  ----------------------------<  88  3.9   |  29  |  0.7    Ca    8.9      06 Dec 2018 07:39  Phos  2.9     12-  Mg     1.8     12-    TPro  6.1  /  Alb  3.4<L>  /  TBili  0.4  /  DBili  x   /  AST  15  /  ALT  6   /  AlkPhos  63  12-    CARDIAC MARKERS ( 06 Dec 2018 07:39 )  x     / <0.01 ng/mL / 82 U/L / x     / 2.0 ng/mL  CARDIAC MARKERS ( 05 Dec 2018 22:26 )  x     / <0.01 ng/mL / x     / x     / x      CARDIAC MARKERS ( 05 Dec 2018 15:48 )  x     / <0.01 ng/mL / x     / x     / x        PT/INR - ( 06 Dec 2018 07:39 )   PT: 12.40 sec;   INR: 1.08 ratio       PTT - ( 06 Dec 2018 07:39 )  PTT:32.5 sec  LIVER FUNCTIONS - ( 06 Dec 2018 07:39 )  Alb: 3.4 g/dL / Pro: 6.1 g/dL / ALK PHOS: 63 U/L / ALT: 6 U/L / AST: 15 U/L / GGT: x           BNP  I&O's Detail    Daily     Daily Weight in k.2 (06 Dec 2018 06:05)

## 2018-12-06 NOTE — PROVIDER CONTACT NOTE (CHANGE IN STATUS NOTIFICATION) - ASSESSMENT
Patient had difficulty speaking, unable to stick out tongue, obeying commands. Vital signs were stable.

## 2018-12-06 NOTE — PROGRESS NOTE ADULT - SUBJECTIVE AND OBJECTIVE BOX
SUBJECTIVE:    Patient is a 73y old lady known to have schizoaffective disorder, bipolar, hypothyroidism, COPD, history of CVA who presents with a chief complaint of Recurrent Falls. Patient had 2 episodes of self-resolving unresponsiveness and seizure-like activity while in hospital. She is currently in tele for HR moitoring.    Currently admitted to medicine with the primary diagnosis of Compression fracture of thoracic vertebra     Today is hospital day 1d. This morning she is resting comfortably in bed and reports no new issues or overnight events.     PAST MEDICAL & SURGICAL HISTORY  Bipolar disorder  Glaucoma, unspecified glaucoma type, unspecified laterality  Anemia  Schizoaffective disorder, depressive type  Vertigo  Pseudoseizure  COPD (chronic obstructive pulmonary disease)  HLD (hyperlipidemia)  CVA (cerebral vascular accident)  H/O:   History of ankle surgery  History of inguinal herniorrhaphy    SOCIAL HISTORY:  Negative for smoking/alcohol/drug use.     ALLERGIES:  Dilantin (Unknown)  Keppra (Unknown)  penicillin (Unknown)  phenobarbital (Unknown)  Tegretol (Unknown)    MEDICATIONS:  STANDING MEDICATIONS  atorvastatin 80 milliGRAM(s) Oral at bedtime  chlorhexidine 4% Liquid 1 Application(s) Topical <User Schedule>  clopidogrel Tablet 75 milliGRAM(s) Oral daily  diVALproex  milliGRAM(s) Oral three times a day  docusate sodium 100 milliGRAM(s) Oral two times a day  enoxaparin Injectable 40 milliGRAM(s) SubCutaneous every 24 hours  famotidine    Tablet 20 milliGRAM(s) Oral two times a day  gabapentin 600 milliGRAM(s) Oral three times a day  ipratropium 17 MICROgram(s) HFA Inhaler 1 Puff(s) Inhalation <User Schedule>  latanoprost 0.005% Ophthalmic Solution 1 Drop(s) Both EYES at bedtime  levothyroxine 50 MICROGram(s) Oral daily  mirtazapine 15 milliGRAM(s) Oral at bedtime  nicotine -  14 mG/24Hr(s) Patch 1 patch Transdermal daily  oxybutynin 5 milliGRAM(s) Oral two times a day  risperiDONE   Tablet 2 milliGRAM(s) Oral at bedtime    PRN MEDICATIONS  acetaminophen   Tablet .. 650 milliGRAM(s) Oral every 6 hours PRN  lactulose Syrup 20 Gram(s) Oral daily PRN  meclizine 25 milliGRAM(s) Oral three times a day PRN  oxyCODONE    IR 5 milliGRAM(s) Oral every 6 hours PRN    VITALS:   T(F): 96.2  HR: 87  BP: 119/58  RR: 18  SpO2: 97%    LABS:                        11.6   3.36  )-----------( 61       ( 06 Dec 2018 07:39 )             34.8         144  |  103  |  10  ----------------------------<  88  3.9   |  29  |  0.7    Ca    8.9      06 Dec 2018 07:39  Phos  2.9       Mg     1.8         TPro  6.1  /  Alb  3.4<L>  /  TBili  0.4  /  DBili  x   /  AST  15  /  ALT  6   /  AlkPhos  63      PT/INR - ( 06 Dec 2018 07:39 )   PT: 12.40 sec;   INR: 1.08 ratio         PTT - ( 06 Dec 2018 07:39 )  PTT:32.5 sec      Creatine Kinase, Serum: 82 U/L (18 @ 07:39)  Troponin T, Serum: <0.01 ng/mL (18 @ 07:39)  Troponin T, Serum: <0.01 ng/mL (18 @ 22:26)  Lactate, Blood: 1.0 mmol/L (18 @ 22:26)  Troponin T, Serum: <0.01 ng/mL (18 @ 15:48)      CARDIAC MARKERS ( 06 Dec 2018 07:39 )  x     / <0.01 ng/mL / 82 U/L / x     / 2.0 ng/mL  CARDIAC MARKERS ( 05 Dec 2018 22:26 )  x     / <0.01 ng/mL / x     / x     / x      CARDIAC MARKERS ( 05 Dec 2018 15:48 )  x     / <0.01 ng/mL / x     / x     / x          PHYSICAL EXAM:  GEN: No acute distress  LUNGS: Clear to auscultation bilaterally   HEART: S1/S2 present. RRR.   ABD: Soft, non-tender, non-distended. Bowel sounds present  EXT: NC/NC/NE/2+PP/LUNA/Skin Intact.   NEURO: AAOX3 SUBJECTIVE:    Patient is a 73y old lady known to have schizoaffective disorder, bipolar, hypothyroidism, COPD, history of CVA who presents with a chief complaint of Recurrent Falls. Patient had 2 episodes of self-resolving unresponsiveness and seizure-like activity while in hospital. She is currently in tele for HR moitoring.  Today is hospital day 1d. This morning she is resting comfortably in bed and reports no active complaints.     PAST MEDICAL & SURGICAL HISTORY  Bipolar disorder  Glaucoma, unspecified glaucoma type, unspecified laterality  Anemia  Schizoaffective disorder, depressive type  Vertigo  Pseudoseizure  COPD (chronic obstructive pulmonary disease)  HLD (hyperlipidemia)  CVA (cerebral vascular accident)  H/O:   History of ankle surgery  History of inguinal herniorrhaphy    SOCIAL HISTORY:  Negative for smoking/alcohol/drug use.     ALLERGIES:  Dilantin (Unknown)  Keppra (Unknown)  penicillin (Unknown)  phenobarbital (Unknown)  Tegretol (Unknown)    MEDICATIONS:  STANDING MEDICATIONS  atorvastatin 80 milliGRAM(s) Oral at bedtime  chlorhexidine 4% Liquid 1 Application(s) Topical <User Schedule>  clopidogrel Tablet 75 milliGRAM(s) Oral daily  diVALproex  milliGRAM(s) Oral three times a day  docusate sodium 100 milliGRAM(s) Oral two times a day  enoxaparin Injectable 40 milliGRAM(s) SubCutaneous every 24 hours  famotidine    Tablet 20 milliGRAM(s) Oral two times a day  gabapentin 600 milliGRAM(s) Oral three times a day  ipratropium 17 MICROgram(s) HFA Inhaler 1 Puff(s) Inhalation <User Schedule>  latanoprost 0.005% Ophthalmic Solution 1 Drop(s) Both EYES at bedtime  levothyroxine 50 MICROGram(s) Oral daily  mirtazapine 15 milliGRAM(s) Oral at bedtime  nicotine -  14 mG/24Hr(s) Patch 1 patch Transdermal daily  oxybutynin 5 milliGRAM(s) Oral two times a day  risperiDONE   Tablet 2 milliGRAM(s) Oral at bedtime    PRN MEDICATIONS  acetaminophen   Tablet .. 650 milliGRAM(s) Oral every 6 hours PRN  lactulose Syrup 20 Gram(s) Oral daily PRN  meclizine 25 milliGRAM(s) Oral three times a day PRN  oxyCODONE    IR 5 milliGRAM(s) Oral every 6 hours PRN    VITALS:   T(F): 96.2  HR: 87  BP: 119/58  RR: 18  SpO2: 97%    LABS:                        11.6   3.36  )-----------( 61       ( 06 Dec 2018 07:39 )             34.8         144  |  103  |  10  ----------------------------<  88  3.9   |  29  |  0.7    Ca    8.9      06 Dec 2018 07:39  Phos  2.9       Mg     1.8         TPro  6.1  /  Alb  3.4<L>  /  TBili  0.4  /  DBili  x   /  AST  15  /  ALT  6   /  AlkPhos  63      PT/INR - ( 06 Dec 2018 07:39 )   PT: 12.40 sec;   INR: 1.08 ratio         PTT - ( 06 Dec 2018 07:39 )  PTT:32.5 sec      Creatine Kinase, Serum: 82 U/L (18 @ 07:39)  Troponin T, Serum: <0.01 ng/mL (18 @ 07:39)  Troponin T, Serum: <0.01 ng/mL (18 @ 22:26)  Lactate, Blood: 1.0 mmol/L (18 @ 22:26)  Troponin T, Serum: <0.01 ng/mL (18 @ 15:48)      CARDIAC MARKERS ( 06 Dec 2018 07:39 )  x     / <0.01 ng/mL / 82 U/L / x     / 2.0 ng/mL  CARDIAC MARKERS ( 05 Dec 2018 22:26 )  x     / <0.01 ng/mL / x     / x     / x      CARDIAC MARKERS ( 05 Dec 2018 15:48 )  x     / <0.01 ng/mL / x     / x     / x          PHYSICAL EXAM:    General: somnolent, slow speech  Neurology: A&Ox3, left sided weak sensation  Head:  Normocephalic, atraumatic, limited motion of neck  ENT:  Mucosa moist, no ulcerations  Neck:  Supple, no JVD,   Resp: CTA B/L  CV: RRR, S1S2,   GI: Soft, NT, bowel sounds  MS: No edema, + peripheral pulses, FROM all 4 extremity          Assessment and Plan    #Recuurent falls and episodes of nonresponsiveness  R/o seizures vs arrhythmias vs pseudoseizures  Neurology following  EEG normal  VEEG done today pending results  Check valproic acid level  Will check orthostatics      #Bradycardia  Episodes of HR 35-40 on monitor  Check TSH  Cardio and EP consulted  consider EP for loop recorder    #C5 compression deformity  As per Neurosurgery. Pain management and PT/ No need for MRI of spine.    #Pancytopenia  R/o depakote and respiridone side effect  Check valproate level  Follow up with Neurology

## 2018-12-06 NOTE — PROVIDER CONTACT NOTE (CHANGE IN STATUS NOTIFICATION) - BACKGROUND
Pt c/o repeated falls x3 weeks. Pt has history of schizoaffective disorder, bipolar, pseudoseizures, CVA, Vertigo

## 2018-12-06 NOTE — PROVIDER CONTACT NOTE (CHANGE IN STATUS NOTIFICATION) - SITUATION
phlebotomist was in pt room attempting to draw blood, patient noted to be unresponsive to verbal stimuli and with seizure like activity. Rapid response was called.

## 2018-12-07 DIAGNOSIS — R41.0 DISORIENTATION, UNSPECIFIED: ICD-10-CM

## 2018-12-07 DIAGNOSIS — F25.9 SCHIZOAFFECTIVE DISORDER, UNSPECIFIED: ICD-10-CM

## 2018-12-07 LAB
APPEARANCE UR: CLEAR — SIGNIFICANT CHANGE UP
BACTERIA # UR AUTO: ABNORMAL /HPF
BASE EXCESS BLDA CALC-SCNC: 6.4 MMOL/L — HIGH (ref -2–2)
BILIRUB UR-MCNC: NEGATIVE — SIGNIFICANT CHANGE UP
COLOR SPEC: YELLOW — SIGNIFICANT CHANGE UP
DIFF PNL FLD: NEGATIVE — SIGNIFICANT CHANGE UP
GLUCOSE UR QL: NEGATIVE MG/DL — SIGNIFICANT CHANGE UP
HCO3 BLDA-SCNC: 31 MMOL/L — HIGH (ref 23–27)
KETONES UR-MCNC: NEGATIVE — SIGNIFICANT CHANGE UP
LEUKOCYTE ESTERASE UR-ACNC: ABNORMAL
NITRITE UR-MCNC: NEGATIVE — SIGNIFICANT CHANGE UP
PCO2 BLDA: 42 MMHG — SIGNIFICANT CHANGE UP (ref 38–42)
PH BLDA: 7.48 — HIGH (ref 7.38–7.42)
PH UR: 6 — SIGNIFICANT CHANGE UP (ref 5–8)
PO2 BLDA: 78 MMHG — SIGNIFICANT CHANGE UP (ref 78–95)
PROT UR-MCNC: NEGATIVE MG/DL — SIGNIFICANT CHANGE UP
SAO2 % BLDA: 96 % — SIGNIFICANT CHANGE UP (ref 94–98)
SP GR SPEC: 1.02 — SIGNIFICANT CHANGE UP (ref 1.01–1.03)
TSH SERPL-MCNC: 4.86 UIU/ML — HIGH (ref 0.27–4.2)
TSH SERPL-MCNC: 4.89 UIU/ML — HIGH (ref 0.27–4.2)
UROBILINOGEN FLD QL: 0.2 MG/DL — SIGNIFICANT CHANGE UP (ref 0.2–0.2)
VALPROATE SERPL-MCNC: 91 UG/ML — SIGNIFICANT CHANGE UP (ref 50–100)
WBC UR QL: SIGNIFICANT CHANGE UP /HPF

## 2018-12-07 RX ORDER — LACOSAMIDE 50 MG/1
100 TABLET ORAL
Qty: 0 | Refills: 0 | Status: DISCONTINUED | OUTPATIENT
Start: 2018-12-07 | End: 2018-12-10

## 2018-12-07 RX ADMIN — MAGNESIUM OXIDE 400 MG ORAL TABLET 400 MILLIGRAM(S): 241.3 TABLET ORAL at 11:50

## 2018-12-07 RX ADMIN — FAMOTIDINE 20 MILLIGRAM(S): 10 INJECTION INTRAVENOUS at 06:33

## 2018-12-07 RX ADMIN — MAGNESIUM OXIDE 400 MG ORAL TABLET 400 MILLIGRAM(S): 241.3 TABLET ORAL at 18:12

## 2018-12-07 RX ADMIN — ENOXAPARIN SODIUM 40 MILLIGRAM(S): 100 INJECTION SUBCUTANEOUS at 11:50

## 2018-12-07 RX ADMIN — CHLORHEXIDINE GLUCONATE 1 APPLICATION(S): 213 SOLUTION TOPICAL at 06:49

## 2018-12-07 RX ADMIN — MIRTAZAPINE 15 MILLIGRAM(S): 45 TABLET, ORALLY DISINTEGRATING ORAL at 21:29

## 2018-12-07 RX ADMIN — GABAPENTIN 600 MILLIGRAM(S): 400 CAPSULE ORAL at 21:29

## 2018-12-07 RX ADMIN — GABAPENTIN 600 MILLIGRAM(S): 400 CAPSULE ORAL at 06:33

## 2018-12-07 RX ADMIN — Medication 100 MILLIGRAM(S): at 06:33

## 2018-12-07 RX ADMIN — DIVALPROEX SODIUM 500 MILLIGRAM(S): 500 TABLET, DELAYED RELEASE ORAL at 13:34

## 2018-12-07 RX ADMIN — Medication 5 MILLIGRAM(S): at 06:32

## 2018-12-07 RX ADMIN — ATORVASTATIN CALCIUM 80 MILLIGRAM(S): 80 TABLET, FILM COATED ORAL at 21:29

## 2018-12-07 RX ADMIN — CLOPIDOGREL BISULFATE 75 MILLIGRAM(S): 75 TABLET, FILM COATED ORAL at 11:49

## 2018-12-07 RX ADMIN — DIVALPROEX SODIUM 500 MILLIGRAM(S): 500 TABLET, DELAYED RELEASE ORAL at 06:33

## 2018-12-07 RX ADMIN — LACOSAMIDE 100 MILLIGRAM(S): 50 TABLET ORAL at 18:12

## 2018-12-07 RX ADMIN — GABAPENTIN 600 MILLIGRAM(S): 400 CAPSULE ORAL at 13:34

## 2018-12-07 RX ADMIN — Medication 100 MILLIGRAM(S): at 18:12

## 2018-12-07 RX ADMIN — MAGNESIUM OXIDE 400 MG ORAL TABLET 400 MILLIGRAM(S): 241.3 TABLET ORAL at 09:29

## 2018-12-07 RX ADMIN — FAMOTIDINE 20 MILLIGRAM(S): 10 INJECTION INTRAVENOUS at 18:12

## 2018-12-07 RX ADMIN — Medication 50 MICROGRAM(S): at 06:33

## 2018-12-07 RX ADMIN — Medication 1 PATCH: at 13:33

## 2018-12-07 RX ADMIN — Medication 1 PATCH: at 11:49

## 2018-12-07 RX ADMIN — RISPERIDONE 2 MILLIGRAM(S): 4 TABLET ORAL at 21:29

## 2018-12-07 RX ADMIN — Medication 5 MILLIGRAM(S): at 18:12

## 2018-12-07 RX ADMIN — LATANOPROST 1 DROP(S): 0.05 SOLUTION/ DROPS OPHTHALMIC; TOPICAL at 21:33

## 2018-12-07 NOTE — BEHAVIORAL HEALTH ASSESSMENT NOTE - LANGUAGE
My Asthma Action Plan  Name: Yakov Cantrell   YOB: 1990  Date: 6/19/2018   My doctor: Dalia Stark MD   My clinic: [facility]        My Control Medicine: Beclomethasone (QVar) -  80 mcg daily  My Rescue Medicine: Albuterol (Proair/Ventolin/Proventil) inhaler every 6 hours  My Oral Steroid Medicine: prednisone. My Asthma Severity: moderate persistent  Avoid your asthma triggers: smoke and upper respiratory infections               GREEN ZONE   Good Control    I feel good    No cough or wheeze    Can work, sleep and play without asthma symptoms       Take your asthma control medicine every day.     1. If exercise triggers your asthma, take your rescue medication    15 minutes before exercise or sports, and    During exercise if you have asthma symptoms  2. Spacer to use with inhaler: If you have a spacer, make sure to use it with your inhaler             YELLOW ZONE Getting Worse  I have ANY of these:    I do not feel good    Cough or wheeze    Chest feels tight    Wake up at night   1. Keep taking your Green Zone medications  2. Start taking your rescue medicine:    every 20 minutes for up to 1 hour. Then every 4 hours for 24-48 hours.  3. If you stay in the Yellow Zone for more than 12-24 hours, contact your doctor.  4. If you do not return to the Green Zone in 12-24 hours or you get worse, start taking your oral steroid medicine if prescribed by your provider.           RED ZONE Medical Alert - Get Help  I have ANY of these:    I feel awful    Medicine is not helping    Breathing getting harder    Trouble walking or talking    Nose opens wide to breathe       1. Take your rescue medicine NOW  2. If your provider has prescribed an oral steroid medicine, start taking it NOW  3. Call your doctor NOW  4. If you are still in the Red Zone after 20 minutes and you have not reached your doctor:    Take your rescue medicine again and    Call 911 or go to the emergency room right away    See  your regular doctor within 2 weeks of an Emergency Room or Urgent Care visit for follow-up treatment.          Annual Reminders:  Meet with Asthma Educator,  Flu Shot in the Fall, consider Pneumonia Vaccination for patients with asthma (aged 19 and older).    Pharmacy: [facility] [id number] [address] AT [facility] [address]                      Asthma Triggers  How To Control Things That Make Your Asthma Worse    Triggers are things that make your asthma worse.  Look at the list below to help you find your triggers and what you can do about them.  You can help prevent asthma flare-ups by staying away from your triggers.      Trigger                                                          What you can do   Cigarette Smoke  Tobacco smoke can make asthma worse. Do not allow smoking in your home, car or around you.  Be sure no one smokes at a child s day care or school.  If you smoke, ask your health care provider for ways to help you quit.  Ask family members to quit too.  Ask your health care provider for a referral to Quit Plan to help you quit smoking, or call 5-204-766-PLAN.     Colds, Flu, Bronchitis  These are common triggers of asthma. Wash your hands often.  Don t touch your eyes, nose or mouth.  Get a flu shot every year.     Dust Mites  These are tiny bugs that live in cloth or carpet. They are too small to see. Wash sheets and blankets in hot water every week.   Encase pillows and mattress in dust mite proof covers.  Avoid having carpet if you can. If you have carpet, vacuum weekly.   Use a dust mask and HEPA vacuum.   Pollen and Outdoor Mold  Some people are allergic to trees, grass, or weed pollen, or molds. Try to keep your windows closed.  Limit time out doors when pollen count is high.   Ask you health care provider about taking medicine during allergy season.     Animal Dander  Some people are allergic to skin flakes, urine or saliva from pets with fur or  feathers. Keep pets with fur or feathers out of your home.    If you can t keep the pet outdoors, then keep the pet out of your bedroom.  Keep the bedroom door closed.  Keep pets off cloth furniture and away from stuffed toys.     Mice, Rats, and Cockroaches  Some people are allergic to the waste from these pests.   Cover food and garbage.  Clean up spills and food crumbs.  Store grease in the refrigerator.   Keep food out of the bedroom.   Indoor Mold  This can be a trigger if your home has high moisture. Fix leaking faucets, pipes, or other sources of water.   Clean moldy surfaces.  Dehumidify basement if it is damp and smelly.   Smoke, Strong Odors, and Sprays  These can reduce air quality. Stay away from strong odors and sprays, such as perfume, powder, hair spray, paints, smoke incense, paint, cleaning products, candles and new carpet.   Exercise or Sports  Some people with asthma have this trigger. Be active!  Ask your doctor about taking medicine before sports or exercise to prevent symptoms.    Warm up for 5-10 minutes before and after sports or exercise.     Other Triggers of Asthma  Cold air:  Cover your nose and mouth with a scarf.  Sometimes laughing or crying can be a trigger.  Some medicines and food can trigger asthma.      No abnormalities noted

## 2018-12-07 NOTE — PROGRESS NOTE ADULT - SUBJECTIVE AND OBJECTIVE BOX
SUBJECTIVE:      Patient is a 73y old lady known to have schizoaffective disorder, bipolar, hypothyroidism, COPD, history of CVA who presents with a chief complaint of Recurrent Falls. Patient had 2 episodes of self-resolving unresponsiveness and seizure-like activity while in hospital. She is currently in tele for HR monitoring  Today is hospital day 2d. This morning she is resting comfortably in bed and reports no active complaints.     PAST MEDICAL & SURGICAL HISTORY  Bipolar disorder  Glaucoma, unspecified glaucoma type, unspecified laterality  Anemia  Schizoaffective disorder, depressive type  Vertigo  Pseudoseizure  COPD (chronic obstructive pulmonary disease)  HLD (hyperlipidemia)  CVA (cerebral vascular accident)  H/O:   History of ankle surgery  History of inguinal herniorrhaphy    SOCIAL HISTORY:  Negative for smoking/alcohol/drug use.     ALLERGIES:  Dilantin (Unknown)  Keppra (Unknown)  penicillin (Unknown)  phenobarbital (Unknown)  Tegretol (Unknown)    MEDICATIONS:  STANDING MEDICATIONS  atorvastatin 80 milliGRAM(s) Oral at bedtime  chlorhexidine 4% Liquid 1 Application(s) Topical <User Schedule>  clopidogrel Tablet 75 milliGRAM(s) Oral daily  diVALproex  milliGRAM(s) Oral three times a day  docusate sodium 100 milliGRAM(s) Oral two times a day  enoxaparin Injectable 40 milliGRAM(s) SubCutaneous every 24 hours  famotidine    Tablet 20 milliGRAM(s) Oral two times a day  gabapentin 600 milliGRAM(s) Oral three times a day  ipratropium 17 MICROgram(s) HFA Inhaler 1 Puff(s) Inhalation <User Schedule>  latanoprost 0.005% Ophthalmic Solution 1 Drop(s) Both EYES at bedtime  levothyroxine 50 MICROGram(s) Oral daily  magnesium oxide 400 milliGRAM(s) Oral three times a day with meals  mirtazapine 15 milliGRAM(s) Oral at bedtime  nicotine -  14 mG/24Hr(s) Patch 1 patch Transdermal daily  oxybutynin 5 milliGRAM(s) Oral two times a day  risperiDONE   Tablet 2 milliGRAM(s) Oral at bedtime    PRN MEDICATIONS  acetaminophen   Tablet .. 650 milliGRAM(s) Oral every 6 hours PRN  lactulose Syrup 20 Gram(s) Oral daily PRN  meclizine 25 milliGRAM(s) Oral three times a day PRN  oxyCODONE    IR 5 milliGRAM(s) Oral every 6 hours PRN    VITALS:   T(F): 96.1  HR: 63  BP: 113/66  RR: 17  SpO2: --    LABS:                        11.6   3.36  )-----------( 61       ( 06 Dec 2018 07:39 )             34.8     12    144  |  103  |  10  ----------------------------<  88  3.9   |  29  |  0.7    Ca    8.9      06 Dec 2018 07:39  Phos  2.9     12-  Mg     1.8         TPro  6.1  /  Alb  3.4<L>  /  TBili  0.4  /  DBili  x   /  AST  15  /  ALT  6   /  AlkPhos  63  12    PT/INR - ( 06 Dec 2018 07:39 )   PT: 12.40 sec;   INR: 1.08 ratio         PTT - ( 06 Dec 2018 07:39 )  PTT:32.5 sec          CARDIAC MARKERS ( 06 Dec 2018 07:39 )  x     / <0.01 ng/mL / 82 U/L / x     / 2.0 ng/mL  CARDIAC MARKERS ( 05 Dec 2018 22:26 )  x     / <0.01 ng/mL / x     / x     / x      CARDIAC MARKERS ( 05 Dec 2018 15:48 )  x     / <0.01 ng/mL / x     / x     / x          PHYSICAL EXAM:  GEN: No acute distress  LUNGS: Clear to auscultation bilaterally   HEART: S1/S2 present. RRR.   ABD: Soft, non-tender, non-distended. Bowel sounds present  EXT: NC/NC/NE/2+PP/LUNA/Skin Intact.   NEURO: AAOX3        General: somnolent, slow speech  Neurology: A&Ox3, left sided weak sensation  Head:  Normocephalic, atraumatic, limited motion of neck  ENT:  Mucosa moist, no ulcerations  Neck:  Supple, no JVD,   Resp: CTA B/L  CV: RRR, S1S2,   GI: Soft, NT, bowel sounds  MS: No edema, + peripheral pulses, FROM all 4 extremity          Assessment and Plan    #Recurrent falls and episodes of nonresponsiveness  R/o seizures vs arrhythmias vs pseudoseizures  Neurology following  EEG normal  VEEG done  pending results  Check valproic acid level  Will check orthostatics  Psychiatry consulted ( r/o pseudoseizures)    #Bradycardia  Episodes of HR 35-40 on monitor   TSH 4.9/ check FT4 FT3  Cardio and EP consulted  will consider loop recorder    #C5 compression deformity  As per Neurosurgery. Pain management and PT/ No need for MRI of spine.    #Pancytopenia  R/o depakote and respiridone side effect  Check valproate level    #Disposition  Pending physiatry/ PT rehab SUBJECTIVE:      Patient is a 73y old lady known to have schizoaffective disorder, bipolar, hypothyroidism, COPD, history of CVA who presents with a chief complaint of Recurrent Falls. Patient had 2 episodes of self-resolving unresponsiveness and seizure-like activity while in hospital. She is currently in tele for HR monitoring  Today is hospital day 2d. This morning she is resting comfortably in bed and reports no active complaints.     PAST MEDICAL & SURGICAL HISTORY  Bipolar disorder  Glaucoma, unspecified glaucoma type, unspecified laterality  Anemia  Schizoaffective disorder, depressive type  Vertigo  Pseudoseizure  COPD (chronic obstructive pulmonary disease)  HLD (hyperlipidemia)  CVA (cerebral vascular accident)  H/O:   History of ankle surgery  History of inguinal herniorrhaphy    SOCIAL HISTORY:  Negative for smoking/alcohol/drug use.     ALLERGIES:  Dilantin (Unknown)  Keppra (Unknown)  penicillin (Unknown)  phenobarbital (Unknown)  Tegretol (Unknown)    MEDICATIONS:  STANDING MEDICATIONS  atorvastatin 80 milliGRAM(s) Oral at bedtime  chlorhexidine 4% Liquid 1 Application(s) Topical <User Schedule>  clopidogrel Tablet 75 milliGRAM(s) Oral daily  diVALproex  milliGRAM(s) Oral three times a day  docusate sodium 100 milliGRAM(s) Oral two times a day  enoxaparin Injectable 40 milliGRAM(s) SubCutaneous every 24 hours  famotidine    Tablet 20 milliGRAM(s) Oral two times a day  gabapentin 600 milliGRAM(s) Oral three times a day  ipratropium 17 MICROgram(s) HFA Inhaler 1 Puff(s) Inhalation <User Schedule>  latanoprost 0.005% Ophthalmic Solution 1 Drop(s) Both EYES at bedtime  levothyroxine 50 MICROGram(s) Oral daily  magnesium oxide 400 milliGRAM(s) Oral three times a day with meals  mirtazapine 15 milliGRAM(s) Oral at bedtime  nicotine -  14 mG/24Hr(s) Patch 1 patch Transdermal daily  oxybutynin 5 milliGRAM(s) Oral two times a day  risperiDONE   Tablet 2 milliGRAM(s) Oral at bedtime    PRN MEDICATIONS  acetaminophen   Tablet .. 650 milliGRAM(s) Oral every 6 hours PRN  lactulose Syrup 20 Gram(s) Oral daily PRN  meclizine 25 milliGRAM(s) Oral three times a day PRN  oxyCODONE    IR 5 milliGRAM(s) Oral every 6 hours PRN    VITALS:   T(F): 96.1  HR: 63  BP: 113/66  RR: 17  SpO2: --    LABS:                        11.6   3.36  )-----------( 61       ( 06 Dec 2018 07:39 )             34.8     12    144  |  103  |  10  ----------------------------<  88  3.9   |  29  |  0.7    Ca    8.9      06 Dec 2018 07:39  Phos  2.9     12-  Mg     1.8         TPro  6.1  /  Alb  3.4<L>  /  TBili  0.4  /  DBili  x   /  AST  15  /  ALT  6   /  AlkPhos  63  12    PT/INR - ( 06 Dec 2018 07:39 )   PT: 12.40 sec;   INR: 1.08 ratio         PTT - ( 06 Dec 2018 07:39 )  PTT:32.5 sec          CARDIAC MARKERS ( 06 Dec 2018 07:39 )  x     / <0.01 ng/mL / 82 U/L / x     / 2.0 ng/mL  CARDIAC MARKERS ( 05 Dec 2018 22:26 )  x     / <0.01 ng/mL / x     / x     / x      CARDIAC MARKERS ( 05 Dec 2018 15:48 )  x     / <0.01 ng/mL / x     / x     / x          PHYSICAL EXAM:  GEN: No acute distress  LUNGS: Clear to auscultation bilaterally   HEART: S1/S2 present. RRR.   ABD: Soft, non-tender, non-distended. Bowel sounds present  EXT: NC/NC/NE/2+PP/LUNA/Skin Intact.   NEURO: AAOX3        General: somnolent, slow speech  Neurology: A&Ox3, left sided weak sensation  Head:  Normocephalic, atraumatic, limited motion of neck  ENT:  Mucosa moist, no ulcerations  Neck:  Supple, no JVD,   Resp: CTA B/L  CV: RRR, S1S2,   GI: Soft, NT, bowel sounds  MS: No edema, + peripheral pulses, FROM all 4 extremity          Assessment and Plan    #Recurrent falls and episodes of nonresponsiveness  R/o seizures vs arrhythmias vs pseudoseizures  Neurology following  EEG normal  VEEG done  pending results  Check valproic acid level  Will check orthostatics  Psychiatry consulted ( r/o pseudoseizures)    #Bradycardia  Episodes of HR 35-40 on monitor   TSH 4.9/ check FT4 FT3  Cardio and EP consulted  will consider loop recorder    #C5 compression deformity  As per Neurosurgery. Pain management and PT/ No need for MRI of spine.    #Pancytopenia  R/o depakote and respiridone side effect  Check valproate level    #Hypomagnesemia  Receiving PO MgOx    #Disposition  Pending physiatry/ PT rehab

## 2018-12-07 NOTE — PROGRESS NOTE ADULT - SUBJECTIVE AND OBJECTIVE BOX
Epilepsy Attending Note:     GEOVANY ROMERO    73y Female  MRN [id number]    Vital Signs Last 24 Hrs  T(C): 35.6 (07 Dec 2018 07:13), Max: 36 (06 Dec 2018 21:43)  T(F): 96.1 (07 Dec 2018 07:13), Max: 96.8 (06 Dec 2018 21:43)  HR: 63 (07 Dec 2018 07:13) (63 - 92)  BP: 113/66 (07 Dec 2018 07:13) (107/51 - 118/63)  BP(mean): --  RR: 17 (07 Dec 2018 07:13) (17 - 18)  SpO2: 97% (07 Dec 2018 11:00) (97% - 97%)                          11.6   3.36  )-----------( 61       ( 06 Dec 2018 07:39 )             34.8       12-06    144  |  103  |  10  ----------------------------<  88  3.9   |  29  |  0.7    Ca    8.9      06 Dec 2018 07:39  Phos  2.9     12-05  Mg     1.8     12-06    TPro  6.1  /  Alb  3.4<L>  /  TBili  0.4  /  DBili  x   /  AST  15  /  ALT  6   /  AlkPhos  63  12-06      MEDICATIONS  (STANDING):  atorvastatin 80 milliGRAM(s) Oral at bedtime  chlorhexidine 4% Liquid 1 Application(s) Topical <User Schedule>  clopidogrel Tablet 75 milliGRAM(s) Oral daily  diVALproex  milliGRAM(s) Oral three times a day  docusate sodium 100 milliGRAM(s) Oral two times a day  enoxaparin Injectable 40 milliGRAM(s) SubCutaneous every 24 hours  famotidine    Tablet 20 milliGRAM(s) Oral two times a day  gabapentin 600 milliGRAM(s) Oral three times a day  ipratropium 17 MICROgram(s) HFA Inhaler 1 Puff(s) Inhalation <User Schedule>  latanoprost 0.005% Ophthalmic Solution 1 Drop(s) Both EYES at bedtime  levothyroxine 50 MICROGram(s) Oral daily  magnesium oxide 400 milliGRAM(s) Oral three times a day with meals  mirtazapine 15 milliGRAM(s) Oral at bedtime  nicotine -  14 mG/24Hr(s) Patch 1 patch Transdermal daily  oxybutynin 5 milliGRAM(s) Oral two times a day  risperiDONE   Tablet 2 milliGRAM(s) Oral at bedtime    MEDICATIONS  (PRN):  acetaminophen   Tablet .. 650 milliGRAM(s) Oral every 6 hours PRN Moderate Pain (4 - 6)  lactulose Syrup 20 Gram(s) Oral daily PRN For constipation  oxyCODONE    IR 5 milliGRAM(s) Oral every 6 hours PRN Severe Pain (7 - 10)            VEEG in the last 24 hours:    Background    Focal and generalized slowing    Interictal activity    Events    Seizures    Impression:    Plan -

## 2018-12-07 NOTE — BEHAVIORAL HEALTH ASSESSMENT NOTE - CASE SUMMARY
Ms Wallace is a 73 year old woman with a history of Schizoaffective disorder who was admitted to the medical floor for the evaluation of frequent falls. Psychiatry consult was called to evaluate for the possibility of pseudoseizures in this patient and to determine if patient's psychotropic medication is causing her pancytopenia. Patient does not appear to be at her baseline mental status as she seems lethargic  and episodically confused. Given the collateral information obtained from patient's residence  and the revision of her hospital chart, patient has a long history of a seizure disorder and is on Depakote for the management of seizures . It is known that patient who have episode s of pseudo seizures most commonly have Seizure disorder . At this time, it is unclear what occurred during the 2 episodes of unresponsiveness last week. Also, although, Risperdal can cause pancytopenia, this is not common and patient's risperdal was started in october 2018, however, patient's pancytopenia has shown a chronic course based on chart review. base on collateral information, patient is known to be anemic. It will be of benefit to evaluate for and treat a potentially more severe cause of pancytopenia in this patient. it is worthy of Note that although it is unlikely that Depakote caused her pancytopenia, Depakote can worsen thrombocytopenias and patient's platelets have been observed to be decreasing gradually.  At this time , patient is not considered an imminent danger to herself or others and does not need inpatient psychiatric hospitalization. Patient will however benefit from continued evaluation and treatment of the potential cause of her delirium. She will benefit from a discontinuation of Depakote for her seizure disorder and replacement with another antiepileptic because of worsening thrombocytopenia as directed by the neurology team.

## 2018-12-07 NOTE — PROGRESS NOTE ADULT - SUBJECTIVE AND OBJECTIVE BOX
Patient was seen and examined. Spoke with RN. Chart reviewed.    No events overnight.  Vital Signs Last 24 Hrs  T(F): 96.1 (07 Dec 2018 07:13), Max: 96.8 (06 Dec 2018 21:43)  HR: 63 (07 Dec 2018 07:13) (63 - 92)  BP: 113/66 (07 Dec 2018 07:13) (107/51 - 118/63)  SpO2: 97% (07 Dec 2018 11:00) (97% - 97%)  MEDICATIONS  (STANDING):  atorvastatin 80 milliGRAM(s) Oral at bedtime  chlorhexidine 4% Liquid 1 Application(s) Topical <User Schedule>  clopidogrel Tablet 75 milliGRAM(s) Oral daily  diVALproex  milliGRAM(s) Oral three times a day  docusate sodium 100 milliGRAM(s) Oral two times a day  enoxaparin Injectable 40 milliGRAM(s) SubCutaneous every 24 hours  famotidine    Tablet 20 milliGRAM(s) Oral two times a day  gabapentin 600 milliGRAM(s) Oral three times a day  ipratropium 17 MICROgram(s) HFA Inhaler 1 Puff(s) Inhalation <User Schedule>  latanoprost 0.005% Ophthalmic Solution 1 Drop(s) Both EYES at bedtime  levothyroxine 50 MICROGram(s) Oral daily  magnesium oxide 400 milliGRAM(s) Oral three times a day with meals  mirtazapine 15 milliGRAM(s) Oral at bedtime  nicotine -  14 mG/24Hr(s) Patch 1 patch Transdermal daily  oxybutynin 5 milliGRAM(s) Oral two times a day  risperiDONE   Tablet 2 milliGRAM(s) Oral at bedtime    MEDICATIONS  (PRN):  acetaminophen   Tablet .. 650 milliGRAM(s) Oral every 6 hours PRN Moderate Pain (4 - 6)  lactulose Syrup 20 Gram(s) Oral daily PRN For constipation  meclizine 25 milliGRAM(s) Oral three times a day PRN Dizziness  oxyCODONE    IR 5 milliGRAM(s) Oral every 6 hours PRN Severe Pain (7 - 10)    Labs:                        11.6   3.36  )-----------( 61       ( 06 Dec 2018 07:39 )             34.8                         10.6   4.20  )-----------( 65       ( 05 Dec 2018 22:26 )             31.5     06 Dec 2018 07:39    144    |  103    |  10     ----------------------------<  88     3.9     |  29     |  0.7    05 Dec 2018 22:26    141    |  102    |  12     ----------------------------<  101    3.8     |  28     |  0.7      Ca    8.9        06 Dec 2018 07:39  Ca    8.5        05 Dec 2018 22:26  Phos  2.9       05 Dec 2018 22:26  Phos  3.3       05 Dec 2018 15:48  Mg     1.8       06 Dec 2018 07:39  Mg     1.7       05 Dec 2018 22:26    TPro  6.1    /  Alb  3.4    /  TBili  0.4    /  DBili  x      /  AST  15     /  ALT  6      /  AlkPhos  63     06 Dec 2018 07:39  TPro  5.7    /  Alb  3.3    /  TBili  0.2    /  DBili  x      /  AST  15     /  ALT  5      /  AlkPhos  59     05 Dec 2018 22:26    PT/INR - ( 06 Dec 2018 07:39 )   PT: 12.40 sec;   INR: 1.08 ratio         PTT - ( 06 Dec 2018 07:39 )  PTT:32.5 sec        Radiology:    General: comfortable, NAD  Neurology: A&Ox1 nonfocal  Head:  Normocephalic, atraumatic  ENT:  Mucosa moist, no ulcerations  Neck:  Supple, no JVD,   Skin: no breakdowns (as per RN)  Resp: CTA B/L  CV: RRR, S1S2,   GI: Soft, NT, bowel sounds  MS: No edema, + peripheral pulses, FROM all 4 extremity

## 2018-12-07 NOTE — BEHAVIORAL HEALTH ASSESSMENT NOTE - SUMMARY
74 y/o   female, domiciled at [facility], multiple medical comorbidities including Seizure Disorder and Anemia, past psychiatric diagnosis of Schizoaffective DO, multiple IPP admissions who presented for frequent falls. Psych consulted for mental health eval.    Given patient's thrombocytopenia, recommend discontinuing Depakote as it can worsen platelet count. From collateral, it appears she was on it for seizure disorder and neuro should be consulted for alternative anti-epileptic recs. Also recommend delirium work up given her change in mental status. Otherwise, patient at this time is linear, does not meet criteria for acute mood episode or thought disorder, and denies suicidal ideations and does not warrant IPP admission.     Plan:  1) Recommend discontinuing Depakote as it cause worsening thrombocytopenia. Please consult neurology for alternative anti-epileptic medication as collateral reports she was on Depakote for Seizures. May continue Risperdal as it was just started in october and her pancytopenia was present prior to that as per chart review.  2) Patient appears altered from baseline, delirium work up recommended.   3) No indication for IPP admission. Signing off, recall PRN. Thank you. 74 y/o   female, domiciled at [facility], multiple medical comorbidities including Seizure Disorder and Anemia, past psychiatric diagnosis of Schizoaffective DO, multiple IPP admissions who presented for frequent falls. Psych consulted for mental health eval. Patient is known to have a long history of a Seizure disorder as per the collateral information obtained from the nursing home. It is unclear at this time if the episodes of unresponsiveness' that occurred lat week was as a result of an actual seizure, a pseudoseizure or any other acute medical cause.     Given patient's thrombocytopenia, recommend discontinuing Depakote as it can worsen platelet count. From collateral, it appears she was on it for seizure disorder and neuro should be consulted for alternative anti-epileptic recs. Also recommend delirium work up given her change in mental status. Otherwise, patient at this time is linear, does not meet criteria for acute mood episode or thought disorder, and denies suicidal ideations and does not warrant IPP admission.     Plan:  1) Recommend discontinuing Depakote as it cause worsening thrombocytopenia. Please consult neurology for alternative anti-epileptic medication as collateral reports she was on Depakote for Seizures. May continue Risperdal as it was just started in october and her pancytopenia was present prior to that as per chart review.  2) Patient appears altered from baseline, delirium work up recommended.   3) No indication for IPP admission. Signing off, recall PRN. Thank you.

## 2018-12-07 NOTE — PROGRESS NOTE ADULT - ASSESSMENT
Assessment and Plan    #Recurrent falls and episodes of nonresponsiveness  R/o seizures vs arrhythmias vs pseudoseizures  Neurology following  EEG normal  VEEG done  pending results  Check valproic acid level  Will check orthostatics  Psychiatry consulted ( r/o pseudoseizures)    #Bradycardia  Episodes of HR 35-40 on monitor   TSH 4.9/ check FT4 FT3  Cardio and EP consulted  will consider loop recorder    #C5 compression deformity  As per Neurosurgery. Pain management and PT/ No need for MRI of spine.    #Pancytopenia  R/o depakote and respiridone side effect  Check valproate level    #Hypomagnesemia  Receiving PO MgOx    #Disposition  Pending physiatry/ PT rehab

## 2018-12-07 NOTE — BEHAVIORAL HEALTH ASSESSMENT NOTE - NSBHCHARTREVIEWVS_PSY_A_CORE FT
Vital Signs Last 24 Hrs  T(C): 35.6 (07 Dec 2018 07:13), Max: 36 (06 Dec 2018 21:43)  T(F): 96.1 (07 Dec 2018 07:13), Max: 96.8 (06 Dec 2018 21:43)  HR: 63 (07 Dec 2018 07:13) (63 - 92)  BP: 113/66 (07 Dec 2018 07:13) (107/51 - 118/63)  BP(mean): --  RR: 17 (07 Dec 2018 07:13) (17 - 18)  SpO2: 97% (07 Dec 2018 11:00) (97% - 97%)

## 2018-12-07 NOTE — CONSULT NOTE ADULT - ATTENDING COMMENTS
s/p fall with lumbar trauma  NS to evaluate   admit to trauma   plan to follow .
Patient seen and examined on 12/5/2018.  Pt without severe mid thoracic back pain.    CT reviewed, which demonstrates T5 compression deformity without retropulsion.  Pt ambulatory without neurological deficits.       Given no significant back pain, unlikely compression deformity is acute, and even it is, would treat conservatively with pain meds, PT.     NO need for MRI unless symptoms worsen or persist.
She was seen and examined. Agree with fellows impression. The cytopenias have been chronic  and are most likely caused by Depakote and has worsened by  risperidone  that was started in October 2018. CT scans did not show splenomegaly.     Plan:  #Pancytopenia most likely drug induced  -check B12, Folate and HIV  -can continue with current meds unless platelets fall under 50,000 or ANC is under 1000 than may need an alternative agent instead of risperidone    Thank You
Pt well-known to our service.  Has longstanding history of pseudoseizures with multiple VEEG capturing non-epileptiform "seizures" in the past also with longstanding history of ataxia with inconsistencies on exam.  Currently has L knee pain limiting exam but inconsistent examination.  Possible supratentorial.      Plan:  - VEEG   - L Knee XR  - PT  - if EEG (-), no further inpt neurologic w/u  - no Ativan for "seizures" unless LOC present and lasting for > 10 mins  - consider psychiatry eval

## 2018-12-07 NOTE — PROGRESS NOTE ADULT - SUBJECTIVE AND OBJECTIVE BOX
Epilepsy Attending Note:     GEOVANY ROMERO    73y Female  MRN [id number]    Vital Signs Last 24 Hrs  T(C): 35.7 (07 Dec 2018 14:03), Max: 36 (06 Dec 2018 21:43)  T(F): 96.2 (07 Dec 2018 14:03), Max: 96.8 (06 Dec 2018 21:43)  HR: 82 (07 Dec 2018 14:03) (63 - 92)  BP: 124/58 (07 Dec 2018 14:03) (107/51 - 124/58)  BP(mean): --  RR: 18 (07 Dec 2018 14:03) (17 - 18)  SpO2: 97% (07 Dec 2018 11:00) (97% - 97%)                          11.6   3.36  )-----------( 61       ( 06 Dec 2018 07:39 )             34.8       12-06    144  |  103  |  10  ----------------------------<  88  3.9   |  29  |  0.7    Ca    8.9      06 Dec 2018 07:39  Phos  2.9     12-05  Mg     1.8     12-06    TPro  6.1  /  Alb  3.4<L>  /  TBili  0.4  /  DBili  x   /  AST  15  /  ALT  6   /  AlkPhos  63  12-06      MEDICATIONS  (STANDING):  atorvastatin 80 milliGRAM(s) Oral at bedtime  chlorhexidine 4% Liquid 1 Application(s) Topical <User Schedule>  clopidogrel Tablet 75 milliGRAM(s) Oral daily  diVALproex  milliGRAM(s) Oral three times a day  docusate sodium 100 milliGRAM(s) Oral two times a day  enoxaparin Injectable 40 milliGRAM(s) SubCutaneous every 24 hours  famotidine    Tablet 20 milliGRAM(s) Oral two times a day  gabapentin 600 milliGRAM(s) Oral three times a day  ipratropium 17 MICROgram(s) HFA Inhaler 1 Puff(s) Inhalation <User Schedule>  latanoprost 0.005% Ophthalmic Solution 1 Drop(s) Both EYES at bedtime  levothyroxine 50 MICROGram(s) Oral daily  magnesium oxide 400 milliGRAM(s) Oral three times a day with meals  mirtazapine 15 milliGRAM(s) Oral at bedtime  nicotine -  14 mG/24Hr(s) Patch 1 patch Transdermal daily  oxybutynin 5 milliGRAM(s) Oral two times a day  risperiDONE   Tablet 2 milliGRAM(s) Oral at bedtime    MEDICATIONS  (PRN):  acetaminophen   Tablet .. 650 milliGRAM(s) Oral every 6 hours PRN Moderate Pain (4 - 6)  lactulose Syrup 20 Gram(s) Oral daily PRN For constipation  oxyCODONE    IR 5 milliGRAM(s) Oral every 6 hours PRN Severe Pain (7 - 10)            VEEG in the last 24 hours:    Background-----8-9 hz    Focal and generalized slowing----none    Interictal activity---none    Events----none    Seizures---none    Impression:  normal V-EEG X 24 hours    Plan - discussed with the neurology          DC the monitoring

## 2018-12-07 NOTE — CONSULT NOTE ADULT - ASSESSMENT
72 yo F with PMH of schizoaffective d/o, bipolar d/o, hx of pseudoseizures CVA, Vertigo, Glaucoma, DLD presenting with frequent falls. pt is being worked up for bradycardia     - bradycardia  h/o syncope and frequent falls   fu EP for possible loop recorder  keep atropine at bedside

## 2018-12-07 NOTE — CONSULT NOTE ADULT - ASSESSMENT
73 yof with PMH of schizoaffective disorder, bipolar disorder, and pseudoseizures admitted for frequent dizziness and falls with position changes.  Found to have T5 compression fracture, which was felt to be chronic; conservative management was recommended.  Hematology consulted for pancytopenia.    1. Pancytopenia-upon review of prior CBCs, patient has had intermittent mild leukopenia, anemia, and thrombocytopenia since 2013/2014.  Thrombocytopenia has worsened from 90s to 100s in October 2018 to 60s during this admission.  However, patient was started on risperidone in October 2018.  Pancytopenia likely drug induced.

## 2018-12-07 NOTE — PROGRESS NOTE ADULT - SUBJECTIVE AND OBJECTIVE BOX
HPI:  Patient is a 72 yo right handed F with PMH of schizoaffective d/o, bipolar d/o, hx of pseudoseizures CVA, Vertigo, Glaucoma, DLD presenting with recurrent falls. She reports for the last three weeks she has been falling multiple times mostly when she stands up from the chair or bed or when she picks up objects from the floor. She reports not losing consciousness but before falling she feels dizzy and light headed. Currently whenever she stands up she feels dizzy also. She does not remember how she fell before admission and she does not remember if she had head trauma. EEG was normal on 12/5. VEEG also unremarkable.    Interval hx: Neurology is being reconsulted to assess Depakote given patient's CBC results. Additionally, it is thought that she has not returned to he cognitive baseline.    MEDICATIONS  (STANDING):  atorvastatin 80 milliGRAM(s) Oral at bedtime  chlorhexidine 4% Liquid 1 Application(s) Topical <User Schedule>  clopidogrel Tablet 75 milliGRAM(s) Oral daily  diVALproex  milliGRAM(s) Oral three times a day  docusate sodium 100 milliGRAM(s) Oral two times a day  enoxaparin Injectable 40 milliGRAM(s) SubCutaneous every 24 hours  famotidine    Tablet 20 milliGRAM(s) Oral two times a day  gabapentin 600 milliGRAM(s) Oral three times a day  ipratropium 17 MICROgram(s) HFA Inhaler 1 Puff(s) Inhalation <User Schedule>  latanoprost 0.005% Ophthalmic Solution 1 Drop(s) Both EYES at bedtime  levothyroxine 50 MICROGram(s) Oral daily  magnesium oxide 400 milliGRAM(s) Oral three times a day with meals  mirtazapine 15 milliGRAM(s) Oral at bedtime  nicotine -  14 mG/24Hr(s) Patch 1 patch Transdermal daily  oxybutynin 5 milliGRAM(s) Oral two times a day  risperiDONE   Tablet 2 milliGRAM(s) Oral at bedtime    Allergies  Dilantin (Unknown)  Keppra (Unknown)  penicillin (Unknown)  phenobarbital (Unknown)  Tegretol (Unknown)  Intolerances  Topamax (Drowsiness)    Exam:  ICU Vital Signs Last 24 Hrs  T(C): 35.6 (07 Dec 2018 07:13), Max: 36 (06 Dec 2018 21:43)  T(F): 96.1 (07 Dec 2018 07:13), Max: 96.8 (06 Dec 2018 21:43)  HR: 63 (07 Dec 2018 07:13) (63 - 92)  BP: 113/66 (07 Dec 2018 07:13) (107/51 - 118/63)  RR: 17 (07 Dec 2018 07:13) (17 - 18)  SpO2: 97% (07 Dec 2018 11:00) (97% - 97%)    General: Appearance is consistent with chronologic age.  No abnormal facies. The patient is AA&O x 3.  Recent and remote memory intact.    Cranial nerves:  EOMI w/o nystagmus, skew or reported double vision.  PERRL.  No ptosis/weakness of eyelid closure.  Facial sensation L>R.  No facial asymmetry.  Hearing grossly intact b/l.  Palate elevates midline.  Tongue midline.  Motor examination:   Normal tone, bulk and range of motion.  No tenderness, twitching, tremors or involuntary movements.  Formal Muscle Strength Testing: (MRC grade R/L) 5/5 UE; 5/5 LE.  No observable drift.  Reflexes:   2+ b/l  biceps, brachioradialis, patella. Clonus absent.  Sensory examination:   Intact to light touch ( sensation is L>R) and pinprick, pain, temperature and proprioception and vibration in all extremities.  Cerebellum: FTN intact with normal THOMAS in all limbs.  No dysmetria or dysdiadokinesia.    Gait: Deferred     Labs:                        11.6   3.36  )-----------( 61       ( 06 Dec 2018 07:39 )             34.8     12-06    144  |  103  |  10  ----------------------------<  88  3.9   |  29  |  0.7    Ca    8.9      06 Dec 2018 07:39  Phos  2.9     12-05  Mg     1.8     12-06    TPro  6.1  /  Alb  3.4<L>  /  TBili  0.4  /  DBili  x   /  AST  15  /  ALT  6   /  AlkPhos  63  12-06    Imaging:  < from: CT Head No Cont (12.05.18 @ 23:53) >  The ventricular system, basal cisterns, and sulcal pattern are prominent   and  compatible with parenchymal volume loss.    There is periventricular and hemispheric white matter hypoattenuation  compatible with chronic microvascular ischemic changes.    There is no acute extra-axial collection.    No mass effect, midline shift, or hemorrhage is seen.    Gray-white differentiation appears grossly preserved.    There is no depressed skull fracture.    The paranasal sinuses and mastoid air cells are well-aerated.    Atherosclerotic calcifications are noted at the skull base.    < end of copied text >    EEG:  < from: EEG Awake or Drowsy (12.05.18 @ 18:00) >  State  Awake    Symmetry  Symmetric  -    Organization  -     PDR   -  Background: 9 hz    Generalized Slowing  No  -    Focal Slowing  No  -  -  -  -  -  -    Breach Artifact: -  -      Activation Procedure  Hyper Ventilation  No  -  -    Photic Stimulation  No  -  -    Epileptiform Activity  No    Frequency:  -  -    Side:  -    Type:  -  -    Area of Activity:  -    Events:  -  -  -    Impression  Normal    < end of copied text > HPI:  Patient is a 74 yo right handed F with PMH of schizoaffective d/o, bipolar d/o, hx of pseudoseizures CVA, Vertigo, Glaucoma, DLD presenting with recurrent falls. She reports for the last three weeks she has been falling multiple times mostly when she stands up from the chair or bed or when she picks up objects from the floor. She reports not losing consciousness but before falling she feels dizzy and light headed. Currently whenever she stands up she feels dizzy also. She does not remember how she fell before admission and she does not remember if she had head trauma. EEG was normal on 12/5. VEEG also unremarkable.    Interval hx: Neurology is being reconsulted to assess Depakote given patient's CBC results. Additionally, it is thought that she has not returned to he cognitive baseline per house staff. No seizure/pseudoseizure x 2 days per resident    MEDICATIONS  (STANDING):  atorvastatin 80 milliGRAM(s) Oral at bedtime  chlorhexidine 4% Liquid 1 Application(s) Topical <User Schedule>  clopidogrel Tablet 75 milliGRAM(s) Oral daily  diVALproex  milliGRAM(s) Oral three times a day  docusate sodium 100 milliGRAM(s) Oral two times a day  enoxaparin Injectable 40 milliGRAM(s) SubCutaneous every 24 hours  famotidine    Tablet 20 milliGRAM(s) Oral two times a day  gabapentin 600 milliGRAM(s) Oral three times a day  ipratropium 17 MICROgram(s) HFA Inhaler 1 Puff(s) Inhalation <User Schedule>  latanoprost 0.005% Ophthalmic Solution 1 Drop(s) Both EYES at bedtime  levothyroxine 50 MICROGram(s) Oral daily  magnesium oxide 400 milliGRAM(s) Oral three times a day with meals  mirtazapine 15 milliGRAM(s) Oral at bedtime  nicotine -  14 mG/24Hr(s) Patch 1 patch Transdermal daily  oxybutynin 5 milliGRAM(s) Oral two times a day  risperiDONE   Tablet 2 milliGRAM(s) Oral at bedtime    Allergies  Dilantin (Unknown)  Keppra (Unknown)  penicillin (Unknown)  phenobarbital (Unknown)  Tegretol (Unknown)  Intolerances  Topamax (Drowsiness)    Exam:  ICU Vital Signs Last 24 Hrs  T(C): 35.6 (07 Dec 2018 07:13), Max: 36 (06 Dec 2018 21:43)  T(F): 96.1 (07 Dec 2018 07:13), Max: 96.8 (06 Dec 2018 21:43)  HR: 63 (07 Dec 2018 07:13) (63 - 92)  BP: 113/66 (07 Dec 2018 07:13) (107/51 - 118/63)  RR: 17 (07 Dec 2018 07:13) (17 - 18)  SpO2: 97% (07 Dec 2018 11:00) (97% - 97%)    General: Appearance is consistent with chronologic age.  No abnormal facies. The patient is AA&O x 3.  Recent and remote memory intact.    Cranial nerves:  EOMI w/o nystagmus, skew or reported double vision.  PERRL.  No ptosis/weakness of eyelid closure.  Facial sensation L>R.  No facial asymmetry.  Hearing grossly intact b/l.  Palate elevates midline.  Tongue midline.  Motor examination:   Normal tone, bulk and range of motion.  No tenderness, twitching, tremors or involuntary movements.  Formal Muscle Strength Testing: (MRC grade R/L) 5/5 UE; 5/5 LE.  No observable drift.  Reflexes:   2+ b/l  biceps, brachioradialis, patella. Clonus absent.  Sensory examination:   Intact to light touch ( sensation is L>R)   Gait: Deferred     Labs:                        11.6   3.36  )-----------( 61       ( 06 Dec 2018 07:39 )             34.8     12-06    144  |  103  |  10  ----------------------------<  88  3.9   |  29  |  0.7    Ca    8.9      06 Dec 2018 07:39  Phos  2.9     12-05  Mg     1.8     12-06    TPro  6.1  /  Alb  3.4<L>  /  TBili  0.4  /  DBili  x   /  AST  15  /  ALT  6   /  AlkPhos  63  12-06    Imaging:  < from: CT Head No Cont (12.05.18 @ 23:53) >  The ventricular system, basal cisterns, and sulcal pattern are prominent   and  compatible with parenchymal volume loss.    There is periventricular and hemispheric white matter hypoattenuation  compatible with chronic microvascular ischemic changes.    There is no acute extra-axial collection.    No mass effect, midline shift, or hemorrhage is seen.    Gray-white differentiation appears grossly preserved.    There is no depressed skull fracture.    The paranasal sinuses and mastoid air cells are well-aerated.    Atherosclerotic calcifications are noted at the skull base.    < end of copied text >    EEG:  < from: EEG Awake or Drowsy (12.05.18 @ 18:00) >  State  Awake    Symmetry  Symmetric  -    Organization  -     PDR   -  Background: 9 hz    Generalized Slowing  No  -    Focal Slowing  No  -  -  -  -  -  -    Breach Artifact: -  -      Activation Procedure  Hyper Ventilation  No  -  -    Photic Stimulation  No  -  -    Epileptiform Activity  No    Frequency:  -  -    Side:  -    Type:  -  -    Area of Activity:  -    Events:  -  -  -    Impression  Normal    < end of copied text > Interval hx: Neurology is being reconsulted to assess Depakote given patient's CBC results. Additionally, it is thought that she has not returned to he cognitive baseline per house staff. No seizure/pseudoseizure x 2 days per resident    MEDICATIONS  (STANDING):  atorvastatin 80 milliGRAM(s) Oral at bedtime  chlorhexidine 4% Liquid 1 Application(s) Topical <User Schedule>  clopidogrel Tablet 75 milliGRAM(s) Oral daily  diVALproex  milliGRAM(s) Oral three times a day  docusate sodium 100 milliGRAM(s) Oral two times a day  enoxaparin Injectable 40 milliGRAM(s) SubCutaneous every 24 hours  famotidine    Tablet 20 milliGRAM(s) Oral two times a day  gabapentin 600 milliGRAM(s) Oral three times a day  ipratropium 17 MICROgram(s) HFA Inhaler 1 Puff(s) Inhalation <User Schedule>  latanoprost 0.005% Ophthalmic Solution 1 Drop(s) Both EYES at bedtime  levothyroxine 50 MICROGram(s) Oral daily  magnesium oxide 400 milliGRAM(s) Oral three times a day with meals  mirtazapine 15 milliGRAM(s) Oral at bedtime  nicotine -  14 mG/24Hr(s) Patch 1 patch Transdermal daily  oxybutynin 5 milliGRAM(s) Oral two times a day  risperiDONE   Tablet 2 milliGRAM(s) Oral at bedtime    Allergies  Dilantin (Unknown)  Keppra (Unknown)  penicillin (Unknown)  phenobarbital (Unknown)  Tegretol (Unknown)  Intolerances  Topamax (Drowsiness)    Exam:  ICU Vital Signs Last 24 Hrs  T(C): 35.6 (07 Dec 2018 07:13), Max: 36 (06 Dec 2018 21:43)  T(F): 96.1 (07 Dec 2018 07:13), Max: 96.8 (06 Dec 2018 21:43)  HR: 63 (07 Dec 2018 07:13) (63 - 92)  BP: 113/66 (07 Dec 2018 07:13) (107/51 - 118/63)  RR: 17 (07 Dec 2018 07:13) (17 - 18)  SpO2: 97% (07 Dec 2018 11:00) (97% - 97%)    General: Appearance is consistent with chronologic age.  No abnormal facies. The patient is AA&O x 3.  Recent and remote memory intact.    Cranial nerves:  EOMI w/o nystagmus, skew or reported double vision.  PERRL.  No ptosis/weakness of eyelid closure.  Facial sensation L>R.  No facial asymmetry.  Hearing grossly intact b/l.  Palate elevates midline.  Tongue midline.  Motor examination:   Normal tone, bulk and range of motion.  No tenderness, twitching, tremors or involuntary movements.  Formal Muscle Strength Testing: (MRC grade R/L) 5/5 UE; 5/5 LE.  No observable drift.  Reflexes:   2+ b/l  biceps, brachioradialis, patella. Clonus absent.  Sensory examination:   Intact to light touch ( sensation is L>R)   Gait: Deferred     Labs:                        11.6   3.36  )-----------( 61       ( 06 Dec 2018 07:39 )             34.8     12-06    144  |  103  |  10  ----------------------------<  88  3.9   |  29  |  0.7    Ca    8.9      06 Dec 2018 07:39  Phos  2.9     12-05  Mg     1.8     12-06    TPro  6.1  /  Alb  3.4<L>  /  TBili  0.4  /  DBili  x   /  AST  15  /  ALT  6   /  AlkPhos  63  12-06    Imaging:  < from: CT Head No Cont (12.05.18 @ 23:53) >  The ventricular system, basal cisterns, and sulcal pattern are prominent   and  compatible with parenchymal volume loss.    There is periventricular and hemispheric white matter hypoattenuation  compatible with chronic microvascular ischemic changes.    There is no acute extra-axial collection.    No mass effect, midline shift, or hemorrhage is seen.    Gray-white differentiation appears grossly preserved.    There is no depressed skull fracture.    The paranasal sinuses and mastoid air cells are well-aerated.    Atherosclerotic calcifications are noted at the skull base.    < end of copied text >    EEG:  < from: EEG Awake or Drowsy (12.05.18 @ 18:00) >  State  Awake    Symmetry  Symmetric  -    Organization  -     PDR   -  Background: 9 hz    Generalized Slowing  No  -    Focal Slowing  No  -  -  -  -  -  -    Breach Artifact: -  -      Activation Procedure  Hyper Ventilation  No  -  -    Photic Stimulation  No  -  -    Epileptiform Activity  No    Frequency:  -  -    Side:  -    Type:  -  -    Area of Activity:  -    Events:  -  -  -    Impression  Normal    < end of copied text >

## 2018-12-07 NOTE — CONSULT NOTE ADULT - SUBJECTIVE AND OBJECTIVE BOX
Patient is a 73y old  Female who presents with a chief complaint of Recurrent Falls (07 Dec 2018 13:05)      HPI:  72 yo F with PMH of schizoaffective d/o, bipolar d/o, hx of pseudoseizures CVA, Vertigo, Glaucoma, DLD presenting with above chief complaint. She reports for the last three weeks she has been falling multiple times mostly when she stands up from the chair or bed or when she picks up objects from the floor. She reports not losing consciousness but before falling she feels dizzy and light headed. Currently whenever she stand up she feels dizzy also. She does not remember how she fell yesterday and she does not remember if she had head trauma. She was sent to the ED for evaluation. PAN CT scan showed a C5 compression deformity. She was admitted for further evaluation. (05 Dec 2018 10:59)    Hematologic HPI: Patient takes depakote 500 mg QID.  Psychiatry recommended that she discontinue this medication secondary to seizures.  She is also on risperidone 2 mg qhs, which was started by psychiatry in 10/2018.  Upon review of CBCs, patient has had low normal to mild thrombocytopenia in 90s-100s since , as well as intermittent leukopenia with normal differential since , and intermittent normocytic anemia since .  Worsening thrombocytopenia appears to correlate with onset of risperidone.    It does not appear that patient has ever been seen by hematologist.       ROS:  Negative except for:    PAST MEDICAL & SURGICAL HISTORY:  Bipolar disorder  Glaucoma, unspecified glaucoma type, unspecified laterality  Anemia  Schizoaffective disorder, depressive type  Vertigo  Pseudoseizure  COPD (chronic obstructive pulmonary disease)  HLD (hyperlipidemia)  CVA (cerebral vascular accident)  H/O:   History of ankle surgery  History of inguinal herniorrhaphy    SOCIAL HISTORY: Active smoker.  Lives in assisted living facility.    FAMILY HISTORY:  Family history of cerebrovascular accident (CVA) (Sibling)      MEDICATIONS  (STANDING):  atorvastatin 80 milliGRAM(s) Oral at bedtime  chlorhexidine 4% Liquid 1 Application(s) Topical <User Schedule>  clopidogrel Tablet 75 milliGRAM(s) Oral daily  diVALproex  milliGRAM(s) Oral three times a day  docusate sodium 100 milliGRAM(s) Oral two times a day  enoxaparin Injectable 40 milliGRAM(s) SubCutaneous every 24 hours  famotidine    Tablet 20 milliGRAM(s) Oral two times a day  gabapentin 600 milliGRAM(s) Oral three times a day  ipratropium 17 MICROgram(s) HFA Inhaler 1 Puff(s) Inhalation <User Schedule>  latanoprost 0.005% Ophthalmic Solution 1 Drop(s) Both EYES at bedtime  levothyroxine 50 MICROGram(s) Oral daily  magnesium oxide 400 milliGRAM(s) Oral three times a day with meals  mirtazapine 15 milliGRAM(s) Oral at bedtime  nicotine -  14 mG/24Hr(s) Patch 1 patch Transdermal daily  oxybutynin 5 milliGRAM(s) Oral two times a day  risperiDONE   Tablet 2 milliGRAM(s) Oral at bedtime    MEDICATIONS  (PRN):  acetaminophen   Tablet .. 650 milliGRAM(s) Oral every 6 hours PRN Moderate Pain (4 - 6)  lactulose Syrup 20 Gram(s) Oral daily PRN For constipation  oxyCODONE    IR 5 milliGRAM(s) Oral every 6 hours PRN Severe Pain (7 - 10)      Allergies    Dilantin (Unknown)  Keppra (Unknown)  penicillin (Unknown)  phenobarbital (Unknown)  Tegretol (Unknown)    Intolerances    Topamax (Drowsiness)      Vital Signs Last 24 Hrs  T(C): 35.6 (07 Dec 2018 07:13), Max: 36 (06 Dec 2018 21:43)  T(F): 96.1 (07 Dec 2018 07:13), Max: 96.8 (06 Dec 2018 21:43)  HR: 63 (07 Dec 2018 07:13) (63 - 92)  BP: 113/66 (07 Dec 2018 07:13) (107/51 - 118/63)  BP(mean): --  RR: 17 (07 Dec 2018 07:13) (17 - 18)  SpO2: 97% (07 Dec 2018 11:00) (97% - 97%)    PHYSICAL EXAM  General: adult in NAD  HEENT: clear oropharynx, anicteric sclera, pink conjunctiva  Neck: supple  CV: normal S1/S2 with no murmur rubs or gallops  Lungs: positive air movement b/l ant lungs,clear to auscultation, no wheezes, no rales  Abdomen: soft non-tender non-distended, no hepatosplenomegaly  Ext: no clubbing cyanosis or edema  Skin: no rashes and no petechiae  Neuro: alert and oriented X 4, no focal deficits      LABS:                          11.6   3.36  )-----------( 61       ( 06 Dec 2018 07:39 )             34.8         Mean Cell Volume : 92.3 fL  Mean Cell Hemoglobin : 30.8 pg  Mean Cell Hemoglobin Concentration : 33.3 g/dL  Auto Neutrophil # : 1.63 K/uL  Auto Lymphocyte # : 1.15 K/uL  Auto Monocyte # : 0.46 K/uL  Auto Eosinophil # : 0.10 K/uL  Auto Basophil # : 0.01 K/uL  Auto Neutrophil % : 48.5 %  Auto Lymphocyte % : 34.2 %  Auto Monocyte % : 13.7 %  Auto Eosinophil % : 3.0 %  Auto Basophil % : 0.3 %      Serial CBC's   @ 07:39  Hct-34.8 / Hgb-11.6 / Plat-61 / RBC-3.77 / WBC-3.36  Serial CBC's   @ 22:26  Hct-31.5 / Hgb-10.6 / Plat-65 / RBC-3.44 / WBC-4.20  Serial CBC's   @ 15:48  Hct-32.8 / Hgb-11.0 / Plat-68 / RBC-3.57 / WBC-3.51  Serial CBC's   @ 19:55  Hct-34.5 / Hgb-11.4 / Plat-70 / RBC-3.63 / WBC-3.34          144  |  103  |  10  ----------------------------<  88  3.9   |  29  |  0.7    Ca    8.9      06 Dec 2018 07:39  Phos  2.9       Mg     1.8         TPro  6.1  /  Alb  3.4<L>  /  TBili  0.4  /  DBili  x   /  AST  15  /  ALT  6   /  AlkPhos  63        PT/INR - ( 06 Dec 2018 07:39 )   PT: 12.40 sec;   INR: 1.08 ratio         PTT - ( 06 Dec 2018 07:39 )  PTT:32.5 sec Patient is a 73y old  Female who presents with a chief complaint of Recurrent Falls (07 Dec 2018 13:05)      HPI:  74 yo F with PMH of schizoaffective d/o, bipolar d/o, hx of pseudoseizures CVA, Vertigo, Glaucoma, DLD presenting with above chief complaint. She reports for the last three weeks she has been falling multiple times mostly when she stands up from the chair or bed or when she picks up objects from the floor. She reports not losing consciousness but before falling she feels dizzy and light headed. Currently whenever she stand up she feels dizzy also. She does not remember how she fell yesterday and she does not remember if she had head trauma. She was sent to the ED for evaluation. PAN CT scan showed a C5 compression deformity. She was admitted for further evaluation. (05 Dec 2018 10:59)    Hematologic HPI: Patient takes depakote 500 mg QID.  Psychiatry recommended that she discontinue this medication secondary to seizures.  She is also on risperidone 2 mg qhs, which was started by psychiatry in 10/2018.  Upon review of CBCs, patient has had low normal to mild thrombocytopenia in 90s-100s since , as well as intermittent leukopenia with normal differential since , and intermittent normocytic anemia since .  Worsening thrombocytopenia appears to correlate with onset of risperidone.    Patient denies ever being seen by hematologist.    ROS: as above    PAST MEDICAL & SURGICAL HISTORY:  Bipolar disorder  Glaucoma, unspecified glaucoma type, unspecified laterality  Anemia  Schizoaffective disorder, depressive type  Vertigo  Pseudoseizure  COPD (chronic obstructive pulmonary disease)  HLD (hyperlipidemia)  CVA (cerebral vascular accident)  H/O:   History of ankle surgery  History of inguinal herniorrhaphy    SOCIAL HISTORY: Active smoker.  Lives in assisted living facility.    FAMILY HISTORY:  Family history of cerebrovascular accident (CVA) (Sibling)      MEDICATIONS  (STANDING):  atorvastatin 80 milliGRAM(s) Oral at bedtime  chlorhexidine 4% Liquid 1 Application(s) Topical <User Schedule>  clopidogrel Tablet 75 milliGRAM(s) Oral daily  diVALproex  milliGRAM(s) Oral three times a day  docusate sodium 100 milliGRAM(s) Oral two times a day  enoxaparin Injectable 40 milliGRAM(s) SubCutaneous every 24 hours  famotidine    Tablet 20 milliGRAM(s) Oral two times a day  gabapentin 600 milliGRAM(s) Oral three times a day  ipratropium 17 MICROgram(s) HFA Inhaler 1 Puff(s) Inhalation <User Schedule>  latanoprost 0.005% Ophthalmic Solution 1 Drop(s) Both EYES at bedtime  levothyroxine 50 MICROGram(s) Oral daily  magnesium oxide 400 milliGRAM(s) Oral three times a day with meals  mirtazapine 15 milliGRAM(s) Oral at bedtime  nicotine -  14 mG/24Hr(s) Patch 1 patch Transdermal daily  oxybutynin 5 milliGRAM(s) Oral two times a day  risperiDONE   Tablet 2 milliGRAM(s) Oral at bedtime    MEDICATIONS  (PRN):  acetaminophen   Tablet .. 650 milliGRAM(s) Oral every 6 hours PRN Moderate Pain (4 - 6)  lactulose Syrup 20 Gram(s) Oral daily PRN For constipation  oxyCODONE    IR 5 milliGRAM(s) Oral every 6 hours PRN Severe Pain (7 - 10)      Allergies    Dilantin (Unknown)  Keppra (Unknown)  penicillin (Unknown)  phenobarbital (Unknown)  Tegretol (Unknown)    Intolerances    Topamax (Drowsiness)      Vital Signs Last 24 Hrs  T(C): 35.6 (07 Dec 2018 07:13), Max: 36 (06 Dec 2018 21:43)  T(F): 96.1 (07 Dec 2018 07:13), Max: 96.8 (06 Dec 2018 21:43)  HR: 63 (07 Dec 2018 07:13) (63 - 92)  BP: 113/66 (07 Dec 2018 07:13) (107/51 - 118/63)  BP(mean): --  RR: 17 (07 Dec 2018 07:13) (17 - 18)  SpO2: 97% (07 Dec 2018 11:00) (97% - 97%)    PHYSICAL EXAM  General: adult in NAD, resting comfortably in bed  HEENT: NC/AT  Neck: supple  CV: +S1, +S2  Lungs: positive air movement b/l ant lungs  Abdomen: soft, NT/ND, +BS  Ext: no edema  Skin: no rashes and no petechiae  Neuro: alert and oriented X 4, no focal deficits, poor historian but answers questions appropriately      LABS:                          11.6   3.36  )-----------( 61       ( 06 Dec 2018 07:39 )             34.8         Mean Cell Volume : 92.3 fL  Mean Cell Hemoglobin : 30.8 pg  Mean Cell Hemoglobin Concentration : 33.3 g/dL  Auto Neutrophil # : 1.63 K/uL  Auto Lymphocyte # : 1.15 K/uL  Auto Monocyte # : 0.46 K/uL  Auto Eosinophil # : 0.10 K/uL  Auto Basophil # : 0.01 K/uL  Auto Neutrophil % : 48.5 %  Auto Lymphocyte % : 34.2 %  Auto Monocyte % : 13.7 %  Auto Eosinophil % : 3.0 %  Auto Basophil % : 0.3 %      Serial CBC's   @ 07:39  Hct-34.8 / Hgb-11.6 / Plat-61 / RBC-3.77 / WBC-3.36  Serial CBC's   @ 22:26  Hct-31.5 / Hgb-10.6 / Plat-65 / RBC-3.44 / WBC-4.20  Serial CBC's   @ 15:48  Hct-32.8 / Hgb-11.0 / Plat-68 / RBC-3.57 / WBC-3.51  Serial CBC's   @ 19:55  Hct-34.5 / Hgb-11.4 / Plat-70 / RBC-3.63 / WBC-3.34          144  |  103  |  10  ----------------------------<  88  3.9   |  29  |  0.7    Ca    8.9      06 Dec 2018 07:39  Phos  2.9       Mg     1.8         TPro  6.1  /  Alb  3.4<L>  /  TBili  0.4  /  DBili  x   /  AST  15  /  ALT  6   /  AlkPhos  63  12-      PT/INR - ( 06 Dec 2018 07:39 )   PT: 12.40 sec;   INR: 1.08 ratio         PTT - ( 06 Dec 2018 07:39 )  PTT:32.5 sec

## 2018-12-07 NOTE — BEHAVIORAL HEALTH ASSESSMENT NOTE - HPI (INCLUDE ILLNESS QUALITY, SEVERITY, DURATION, TIMING, CONTEXT, MODIFYING FACTORS, ASSOCIATED SIGNS AND SYMPTOMS)
72 y/o   female, domiciled at [facility], multiple medical comorbidities including Seizure Disorder and Anemia, past psychiatric diagnosis of Schizoaffective DO, multiple [facility]PP admissions who presented for frequent falls. Psych consulted for mental health eval.    Patient on approach is sleepy, but arousable to verbal stimuli. She is able to communicate where she lives, who she lives with, how she got to the hospital and is AOx3. Denies hearing any voices, denies any suicidal intent or plan. Unable to engage in comprehensive interview given somnolence.     Collateral obtained from medical coordinator at [facility]: Patient resides there with her , is complaint with meds, sociable with other residents, known to leave the property to go to the store with . She is not known to be hallucinating or delusional at baseline at the home. Her current presentation of being drowsy is not her baseline. She has a documented history of Seizure Disorder, and has been on Depakote for a long time. She was just started on Risperdal 2mg QHS in oct 2018 by psychiatric NP Maximiliano Mcdonough. 72 y/o   female, domiciled at [facility], multiple medical comorbidities including Seizure Disorder and Anemia, past psychiatric diagnosis of Schizoaffective DO, multiple IPP admissions who presented for frequent falls. Psych consulted for mental health eval.    Patient on approach is very drowsy, but arousable to verbal stimuli. She is able to communicate where she lives, who she lives with, how she got to the hospital and is AOx3. Denies hearing any voices, denies any suicidal intent or plan. Unable to engage in comprehensive interview given somnolence.     Collateral obtained from medical coordinator at [facility]: Patient resides there with her , is complaint with meds, sociable with other residents, known to leave the property to go to the store with . She is not known to be hallucinating or delusional at baseline at the home. Her current presentation of being drowsy is not her baseline. She has a documented history of Seizure Disorder, and has been on Depakote for a long time. She was just started on Risperdal 2mg QHS in oct 2018 by psychiatric NP Maximiliano Mcdonough.

## 2018-12-07 NOTE — CONSULT NOTE ADULT - SUBJECTIVE AND OBJECTIVE BOX
Patient is a 73y old  Female who presents with a chief complaint of Recurrent Falls (06 Dec 2018 17:05)      HPI:  74 yo F with PMH of schizoaffective d/o, bipolar d/o, hx of pseudoseizures CVA, Vertigo, Glaucoma, DLD presenting with above chief complaint. She reports for the last three weeks she has been falling multiple times mostly when she stands up from the chair or bed or when she picks up objects from the floor. She reports not losing consciousness but before falling she feels dizzy and light headed. Currently whenever she stand up she feels dizzy also. She does not remember how she fell yesterday and she does not remember if she had head trauma. She was sent to the ED for evaluation. PAN CT scan showed a C5 compression deformity. She was admitted for further evaluation. (05 Dec 2018 10:59)  Pt ahving episodes of bradycardia down to 35 bpm      PAST MEDICAL & SURGICAL HISTORY:  Bipolar disorder  Glaucoma, unspecified glaucoma type, unspecified laterality  Anemia  Schizoaffective disorder, depressive type  Vertigo  Pseudoseizure  COPD (chronic obstructive pulmonary disease)  HLD (hyperlipidemia)  CVA (cerebral vascular accident)  H/O:   History of ankle surgery  History of inguinal herniorrhaphy      MEDICATIONS  (STANDING):  atorvastatin 80 milliGRAM(s) Oral at bedtime  chlorhexidine 4% Liquid 1 Application(s) Topical <User Schedule>  clopidogrel Tablet 75 milliGRAM(s) Oral daily  diVALproex  milliGRAM(s) Oral three times a day  docusate sodium 100 milliGRAM(s) Oral two times a day  enoxaparin Injectable 40 milliGRAM(s) SubCutaneous every 24 hours  famotidine    Tablet 20 milliGRAM(s) Oral two times a day  gabapentin 600 milliGRAM(s) Oral three times a day  ipratropium 17 MICROgram(s) HFA Inhaler 1 Puff(s) Inhalation <User Schedule>  latanoprost 0.005% Ophthalmic Solution 1 Drop(s) Both EYES at bedtime  levothyroxine 50 MICROGram(s) Oral daily  magnesium oxide 400 milliGRAM(s) Oral three times a day with meals  mirtazapine 15 milliGRAM(s) Oral at bedtime  nicotine -  14 mG/24Hr(s) Patch 1 patch Transdermal daily  oxybutynin 5 milliGRAM(s) Oral two times a day  risperiDONE   Tablet 2 milliGRAM(s) Oral at bedtime    MEDICATIONS  (PRN):  acetaminophen   Tablet .. 650 milliGRAM(s) Oral every 6 hours PRN Moderate Pain (4 - 6)  lactulose Syrup 20 Gram(s) Oral daily PRN For constipation  meclizine 25 milliGRAM(s) Oral three times a day PRN Dizziness  oxyCODONE    IR 5 milliGRAM(s) Oral every 6 hours PRN Severe Pain (7 - 10)      FAMILY HISTORY:  Family history of cerebrovascular accident (CVA) (Sibling)          REVIEW OF SYSTEMS    SOCIAL HISTORY:  CIGARETTES: 10 cig daily  ALCOHOL: X      Vital Signs Last 24 Hrs  T(C): 35.6 (07 Dec 2018 07:13), Max: 36 (06 Dec 2018 21:43)  T(F): 96.1 (07 Dec 2018 07:13), Max: 96.8 (06 Dec 2018 21:43)  HR: 63 (07 Dec 2018 07:13) (63 - 92)  BP: 113/66 (07 Dec 2018 07:13) (107/51 - 118/63)  RR: 17 (07 Dec 2018 07:13) (17 - 18)    PHYSICAL EXAM:  GEN:  RESP:  CARD:  NEURO:  EXT:        ECG:  Sinus bradycardia with PACs      LABS:                        11.6   3.36  )-----------( 61       ( 06 Dec 2018 07:39 )             34.8     12-    144  |  103  |  10  ----------------------------<  88  3.9   |  29  |  0.7    Ca    8.9      06 Dec 2018 07:39  Phos  2.9     12-  Mg     1.8     12-    TPro  6.1  /  Alb  3.4<L>  /  TBili  0.4  /  DBili  x   /  AST  15  /  ALT  6   /  AlkPhos  63  12-06    CARDIAC MARKERS ( 06 Dec 2018 07:39 )  x     / <0.01 ng/mL / 82 U/L / x     / 2.0 ng/mL  CARDIAC MARKERS ( 05 Dec 2018 22:26 )  x     / <0.01 ng/mL / x     / x     / x      CARDIAC MARKERS ( 05 Dec 2018 15:48 )  x     / <0.01 ng/mL / x     / x     / x          PT/INR - ( 06 Dec 2018 07:39 )   PT: 12.40 sec;   INR: 1.08 ratio    PTT - ( 06 Dec 2018 07:39 )  PTT:32.5 s

## 2018-12-07 NOTE — PROGRESS NOTE ADULT - ASSESSMENT
Impression:   74 y/o female admitted for recurrent falls. Patient, per medicine note, she was initially admitted to medicine unit, but on admission an order was put in to transfer to telemetry so a full syncope work up could be completed.  In the interm of waiting for a bed on tele, a rapid response was called for "seizure like activity", most like pseudoseizure. On Neuro exam there was no focal deficts with the exception of decreased sensation on left side of te body (which the patient attributes to her previous stroke and states that this is her baseline).  Ultimately the question was seizure vs pseudoseizure. VEEG negative    Plan: Impression:   74 y/o female admitted for recurrent falls. Patient, per medicine note, she was initially admitted to medicine unit, but on admission an order was put in to transfer to telemetry so a full syncope work up could be completed.  In the interm of waiting for a bed on tele, a rapid response was called for "seizure like activity", most like pseudoseizure. On Neuro exam there was no focal deficts with the exception of decreased sensation on left side of te body (which the patient attributes to her previous stroke and states that this is her baseline).  Ultimately the question was seizure vs pseudoseizure. VEEG negative    Plan:  -check depakote level Impression:   74 y/o female admitted for recurrent falls. Patient, per medicine note, she was initially admitted to medicine unit, but on admission an order was put in to transfer to telemetry so a full syncope work up could be completed.  In the interm of waiting for a bed on tele, a rapid response was called for "seizure like activity", most like pseudoseizure. On Neuro exam there was no focal deficts with the exception of decreased sensation on left side of te body (which the patient attributes to her previous stroke and states that this is her baseline).  Ultimately the question was seizure vs pseudoseizure. VEEG negative. PT is interactive and AAOx3.    Plan:  -check depakote level Impression:   74 y/o female admitted for recurrent falls. Patient, per medicine note, she was initially admitted to medicine unit, but on admission an order was put in to transfer to telemetry so a full syncope work up could be completed.  In the interm of waiting for a bed on tele, a rapid response was called for "seizure like activity", most like pseudoseizure. On Neuro exam there was no focal deficts with the exception of decreased sensation on left side of te body (which the patient attributes to her previous stroke and states that this is her baseline).  Ultimately the question was seizure vs pseudoseizure. VEEG negative. PT is interactive and AAOx3.    Plan:  -check depakote level  - Stop Depakote Start Vimpat 100 mg BID  - follow CBC trend Impression:   74 y/o female admitted for recurrent falls. Patient, per medicine note, she was initially admitted to medicine unit, but on admission an order was put in to transfer to telemetry so a full syncope work up could be completed.  In the interm of waiting for a bed on tele, a rapid response was called for "seizure like activity", most like pseudoseizure. On Neuro exam there was no focal deficts with the exception of decreased sensation on left side of te body (which the patient attributes to her previous stroke and states that this is her baseline).  Ultimately the question was seizure vs pseudoseizure. VEEG negative. PT is interactive and AAOx3.    Plan:  - Stop Depakote Start Vimpat 100 mg BID  - follow CBC trend  - seizure precautions  - PT eval and treat

## 2018-12-07 NOTE — BEHAVIORAL HEALTH ASSESSMENT NOTE - OTHER PAST PSYCHIATRIC HISTORY (INCLUDE DETAILS REGARDING ONSET, COURSE OF ILLNESS, INPATIENT/OUTPATIENT TREATMENT)
As per chart review and collateral, Schizoaffective DO, last known admission to [facility] 2015. Documental suicidality as per chart review, patient denies current SI.

## 2018-12-08 LAB
BASOPHILS # BLD AUTO: 0.02 K/UL — SIGNIFICANT CHANGE UP (ref 0–0.2)
BASOPHILS NFR BLD AUTO: 0.5 % — SIGNIFICANT CHANGE UP (ref 0–1)
EOSINOPHIL # BLD AUTO: 0.1 K/UL — SIGNIFICANT CHANGE UP (ref 0–0.7)
EOSINOPHIL NFR BLD AUTO: 2.6 % — SIGNIFICANT CHANGE UP (ref 0–8)
GLUCOSE BLDC GLUCOMTR-MCNC: 160 MG/DL — HIGH (ref 70–99)
HCT VFR BLD CALC: 37.4 % — SIGNIFICANT CHANGE UP (ref 37–47)
HGB BLD-MCNC: 12.3 G/DL — SIGNIFICANT CHANGE UP (ref 12–16)
IMM GRANULOCYTES NFR BLD AUTO: 0.3 % — SIGNIFICANT CHANGE UP (ref 0.1–0.3)
LEVETIRACETAM SERPL-MCNC: <2 MCG/ML — LOW (ref 12–46)
LYMPHOCYTES # BLD AUTO: 1.3 K/UL — SIGNIFICANT CHANGE UP (ref 1.2–3.4)
LYMPHOCYTES # BLD AUTO: 34.2 % — SIGNIFICANT CHANGE UP (ref 20.5–51.1)
MCHC RBC-ENTMCNC: 30.6 PG — SIGNIFICANT CHANGE UP (ref 27–31)
MCHC RBC-ENTMCNC: 32.9 G/DL — SIGNIFICANT CHANGE UP (ref 32–37)
MCV RBC AUTO: 93 FL — SIGNIFICANT CHANGE UP (ref 81–99)
MONOCYTES # BLD AUTO: 0.62 K/UL — HIGH (ref 0.1–0.6)
MONOCYTES NFR BLD AUTO: 16.3 % — HIGH (ref 1.7–9.3)
NEUTROPHILS # BLD AUTO: 1.75 K/UL — SIGNIFICANT CHANGE UP (ref 1.4–6.5)
NEUTROPHILS NFR BLD AUTO: 46.1 % — SIGNIFICANT CHANGE UP (ref 42.2–75.2)
NRBC # BLD: 0 /100 WBCS — SIGNIFICANT CHANGE UP (ref 0–0)
PLATELET # BLD AUTO: 67 K/UL — LOW (ref 130–400)
RBC # BLD: 4.02 M/UL — LOW (ref 4.2–5.4)
RBC # FLD: 14.4 % — SIGNIFICANT CHANGE UP (ref 11.5–14.5)
WBC # BLD: 3.8 K/UL — LOW (ref 4.8–10.8)
WBC # FLD AUTO: 3.8 K/UL — LOW (ref 4.8–10.8)

## 2018-12-08 RX ADMIN — MIRTAZAPINE 15 MILLIGRAM(S): 45 TABLET, ORALLY DISINTEGRATING ORAL at 22:59

## 2018-12-08 RX ADMIN — MAGNESIUM OXIDE 400 MG ORAL TABLET 400 MILLIGRAM(S): 241.3 TABLET ORAL at 12:05

## 2018-12-08 RX ADMIN — CLOPIDOGREL BISULFATE 75 MILLIGRAM(S): 75 TABLET, FILM COATED ORAL at 12:05

## 2018-12-08 RX ADMIN — ATORVASTATIN CALCIUM 80 MILLIGRAM(S): 80 TABLET, FILM COATED ORAL at 22:59

## 2018-12-08 RX ADMIN — RISPERIDONE 2 MILLIGRAM(S): 4 TABLET ORAL at 22:59

## 2018-12-08 RX ADMIN — CHLORHEXIDINE GLUCONATE 1 APPLICATION(S): 213 SOLUTION TOPICAL at 06:30

## 2018-12-08 RX ADMIN — FAMOTIDINE 20 MILLIGRAM(S): 10 INJECTION INTRAVENOUS at 17:22

## 2018-12-08 RX ADMIN — GABAPENTIN 600 MILLIGRAM(S): 400 CAPSULE ORAL at 06:31

## 2018-12-08 RX ADMIN — GABAPENTIN 600 MILLIGRAM(S): 400 CAPSULE ORAL at 22:59

## 2018-12-08 RX ADMIN — LACOSAMIDE 100 MILLIGRAM(S): 50 TABLET ORAL at 17:48

## 2018-12-08 RX ADMIN — MAGNESIUM OXIDE 400 MG ORAL TABLET 400 MILLIGRAM(S): 241.3 TABLET ORAL at 17:22

## 2018-12-08 RX ADMIN — LACOSAMIDE 100 MILLIGRAM(S): 50 TABLET ORAL at 06:31

## 2018-12-08 RX ADMIN — MAGNESIUM OXIDE 400 MG ORAL TABLET 400 MILLIGRAM(S): 241.3 TABLET ORAL at 09:00

## 2018-12-08 RX ADMIN — Medication 1 PATCH: at 12:05

## 2018-12-08 RX ADMIN — Medication 5 MILLIGRAM(S): at 17:23

## 2018-12-08 RX ADMIN — Medication 50 MICROGRAM(S): at 06:31

## 2018-12-08 RX ADMIN — LATANOPROST 1 DROP(S): 0.05 SOLUTION/ DROPS OPHTHALMIC; TOPICAL at 23:00

## 2018-12-08 RX ADMIN — FAMOTIDINE 20 MILLIGRAM(S): 10 INJECTION INTRAVENOUS at 06:32

## 2018-12-08 RX ADMIN — GABAPENTIN 600 MILLIGRAM(S): 400 CAPSULE ORAL at 15:00

## 2018-12-08 RX ADMIN — Medication 5 MILLIGRAM(S): at 06:31

## 2018-12-08 RX ADMIN — ENOXAPARIN SODIUM 40 MILLIGRAM(S): 100 INJECTION SUBCUTANEOUS at 12:05

## 2018-12-08 NOTE — PROGRESS NOTE ADULT - SUBJECTIVE AND OBJECTIVE BOX
Patient was seen and examined. Spoke with RN. Chart reviewed.    No events overnight.  Vital Signs Last 24 Hrs  T(F): 96 (08 Dec 2018 05:42), Max: 96.6 (07 Dec 2018 21:05)  HR: 85 (08 Dec 2018 05:42) (77 - 85)  BP: 107/53 (08 Dec 2018 05:42) (107/53 - 129/62)  SpO2: --  MEDICATIONS  (STANDING):  atorvastatin 80 milliGRAM(s) Oral at bedtime  chlorhexidine 4% Liquid 1 Application(s) Topical <User Schedule>  clopidogrel Tablet 75 milliGRAM(s) Oral daily  docusate sodium 100 milliGRAM(s) Oral two times a day  enoxaparin Injectable 40 milliGRAM(s) SubCutaneous every 24 hours  famotidine    Tablet 20 milliGRAM(s) Oral two times a day  gabapentin 600 milliGRAM(s) Oral three times a day  ipratropium 17 MICROgram(s) HFA Inhaler 1 Puff(s) Inhalation <User Schedule>  lacosamide 100 milliGRAM(s) Oral two times a day  latanoprost 0.005% Ophthalmic Solution 1 Drop(s) Both EYES at bedtime  levothyroxine 50 MICROGram(s) Oral daily  magnesium oxide 400 milliGRAM(s) Oral three times a day with meals  mirtazapine 15 milliGRAM(s) Oral at bedtime  nicotine -  14 mG/24Hr(s) Patch 1 patch Transdermal daily  oxybutynin 5 milliGRAM(s) Oral two times a day  risperiDONE   Tablet 2 milliGRAM(s) Oral at bedtime    MEDICATIONS  (PRN):  acetaminophen   Tablet .. 650 milliGRAM(s) Oral every 6 hours PRN Moderate Pain (4 - 6)  lactulose Syrup 20 Gram(s) Oral daily PRN For constipation  oxyCODONE    IR 5 milliGRAM(s) Oral every 6 hours PRN Severe Pain (7 - 10)    Labs:                        12.3   3.80  )-----------( 67       ( 08 Dec 2018 07:52 )             37.4             Urinalysis Basic - ( 07 Dec 2018 18:30 )    Color: Yellow / Appearance: Clear / S.020 / pH: x  Gluc: x / Ketone: Negative  / Bili: Negative / Urobili: 0.2 mg/dL   Blood: x / Protein: Negative mg/dL / Nitrite: Negative   Leuk Esterase: Small / RBC: x / WBC 3-5 /HPF   Sq Epi: x / Non Sq Epi: x / Bacteria: Few /HPF          Radiology:    General: comfortable, NAD  Neurology: A&Ox1 nonfocal  Head:  Normocephalic, atraumatic  ENT:  Mucosa moist, no ulcerations  Neck:  Supple, no JVD,   Skin: no breakdowns (as per RN)  Resp: CTA B/L  CV: RRR, S1S2,   GI: Soft, NT, bowel sounds  MS: No edema, + peripheral pulses, FROM all 4 extremity

## 2018-12-08 NOTE — PROGRESS NOTE ADULT - ASSESSMENT
Assessment and Plan    #Recurrent falls and episodes of nonresponsiveness  R/o seizures vs arrhythmias vs pseudoseizures  Still episodicallty confused/ somnolent  EEG normal/ VEEG normal  valproic acid level WNL  Will check orthostatics  Psychiatry consulted : NO danger to self or others    #Bradycardia/ PACs  Episodes of HR 35-40 on monitor and frequent PACs  TSH 4.9/ check FT4 FT3  EP consulted: No need for loop recorder or pacemaker for now  ABGs WNL  Pending TTE    #Pancytopenia  R/o depakote and respiridone side effect  valproate level WNL  Switched depakote to Vimpat 100 BID  Oncology following: most probably drug side effect/ check B12 / folate/ HIV    #C5 compression deformity  As per Neurosurgery. Pain management and PT/ No need for MRI of spine.    #Hypomagnesemia  Receiving PO MgOx  #Disposition  Pending PT rehab/ back to SNF

## 2018-12-08 NOTE — PROGRESS NOTE ADULT - SUBJECTIVE AND OBJECTIVE BOX
SUBJECTIVE:  Patient is a 73y old lady known to have schizoaffective disorder, bipolar, hypothyroidism, COPD, history of CVA who presents with a chief complaint of Recurrent Falls. Patient had 2 episodes of self-resolving unresponsiveness and seizure-like activity while in hospital. She is currently in tele for HR monitoring   This morning she is resting comfortably in bed and reports no active complaints. She is still episodically somnolent and lethargic.    PAST MEDICAL & SURGICAL HISTORY  Bipolar disorder  Glaucoma, unspecified glaucoma type, unspecified laterality  Anemia  Schizoaffective disorder, depressive type  Vertigo  Pseudoseizure  COPD (chronic obstructive pulmonary disease)  HLD (hyperlipidemia)  CVA (cerebral vascular accident)  H/O:   History of ankle surgery  History of inguinal herniorrhaphy    SOCIAL HISTORY:  Negative for smoking/alcohol/drug use.     ALLERGIES:  Dilantin (Unknown)  Keppra (Unknown)  penicillin (Unknown)  phenobarbital (Unknown)  Tegretol (Unknown)    MEDICATIONS:  STANDING MEDICATIONS  atorvastatin 80 milliGRAM(s) Oral at bedtime  chlorhexidine 4% Liquid 1 Application(s) Topical <User Schedule>  clopidogrel Tablet 75 milliGRAM(s) Oral daily  docusate sodium 100 milliGRAM(s) Oral two times a day  enoxaparin Injectable 40 milliGRAM(s) SubCutaneous every 24 hours  famotidine    Tablet 20 milliGRAM(s) Oral two times a day  gabapentin 600 milliGRAM(s) Oral three times a day  ipratropium 17 MICROgram(s) HFA Inhaler 1 Puff(s) Inhalation <User Schedule>  lacosamide 100 milliGRAM(s) Oral two times a day  latanoprost 0.005% Ophthalmic Solution 1 Drop(s) Both EYES at bedtime  levothyroxine 50 MICROGram(s) Oral daily  magnesium oxide 400 milliGRAM(s) Oral three times a day with meals  mirtazapine 15 milliGRAM(s) Oral at bedtime  nicotine -  14 mG/24Hr(s) Patch 1 patch Transdermal daily  oxybutynin 5 milliGRAM(s) Oral two times a day  risperiDONE   Tablet 2 milliGRAM(s) Oral at bedtime    PRN MEDICATIONS  acetaminophen   Tablet .. 650 milliGRAM(s) Oral every 6 hours PRN  lactulose Syrup 20 Gram(s) Oral daily PRN  oxyCODONE    IR 5 milliGRAM(s) Oral every 6 hours PRN    VITALS:   T(F): 96  HR: 85  BP: 107/53  RR: 18  SpO2: 97%    LABS:                        12.3   3.80  )-----------( 67       ( 08 Dec 2018 07:52 )             37.4             Urinalysis Basic - ( 07 Dec 2018 18:30 )    Color: Yellow / Appearance: Clear / S.020 / pH: x  Gluc: x / Ketone: Negative  / Bili: Negative / Urobili: 0.2 mg/dL   Blood: x / Protein: Negative mg/dL / Nitrite: Negative   Leuk Esterase: Small / RBC: x / WBC 3-5 /HPF   Sq Epi: x / Non Sq Epi: x / Bacteria: Few /HPF      ABG - ( 07 Dec 2018 04:12 )  pH, Arterial: 7.48  pH, Blood: x     /  pCO2: 42    /  pO2: 78    / HCO3: 31    / Base Excess: 6.4   /  SaO2: 96                PHYSICAL EXAM:  GEN: No acute distress  LUNGS: Clear to auscultation bilaterally   HEART: S1/S2 present. RRR.   ABD: Soft, non-tender, non-distended. Bowel sounds present  EXT: NC/NC/NE/2+PP/LUNA/Skin Intact.   NEURO: AAOX3    Intravenous access:   NG tube:   Davis cathter: SUBJECTIVE:  Patient is a 73y old lady known to have schizoaffective disorder, bipolar, hypothyroidism, COPD, history of CVA who presents with a chief complaint of Recurrent Falls. Patient had 2 episodes of self-resolving unresponsiveness and seizure-like activity while in hospital. She is currently in tele for HR monitoring   This morning she is resting comfortably in bed and reports no active complaints. She is still episodically somnolent and lethargic.    PAST MEDICAL & SURGICAL HISTORY  Bipolar disorder  Glaucoma, unspecified glaucoma type, unspecified laterality  Anemia  Schizoaffective disorder, depressive type  Vertigo  Pseudoseizure  COPD (chronic obstructive pulmonary disease)  HLD (hyperlipidemia)  CVA (cerebral vascular accident)  H/O:   History of ankle surgery  History of inguinal herniorrhaphy    SOCIAL HISTORY:  Negative for smoking/alcohol/drug use.     ALLERGIES:  Dilantin (Unknown)  Keppra (Unknown)  penicillin (Unknown)  phenobarbital (Unknown)  Tegretol (Unknown)    MEDICATIONS:  STANDING MEDICATIONS  atorvastatin 80 milliGRAM(s) Oral at bedtime  chlorhexidine 4% Liquid 1 Application(s) Topical <User Schedule>  clopidogrel Tablet 75 milliGRAM(s) Oral daily  docusate sodium 100 milliGRAM(s) Oral two times a day  enoxaparin Injectable 40 milliGRAM(s) SubCutaneous every 24 hours  famotidine    Tablet 20 milliGRAM(s) Oral two times a day  gabapentin 600 milliGRAM(s) Oral three times a day  ipratropium 17 MICROgram(s) HFA Inhaler 1 Puff(s) Inhalation <User Schedule>  lacosamide 100 milliGRAM(s) Oral two times a day  latanoprost 0.005% Ophthalmic Solution 1 Drop(s) Both EYES at bedtime  levothyroxine 50 MICROGram(s) Oral daily  magnesium oxide 400 milliGRAM(s) Oral three times a day with meals  mirtazapine 15 milliGRAM(s) Oral at bedtime  nicotine -  14 mG/24Hr(s) Patch 1 patch Transdermal daily  oxybutynin 5 milliGRAM(s) Oral two times a day  risperiDONE   Tablet 2 milliGRAM(s) Oral at bedtime    PRN MEDICATIONS  acetaminophen   Tablet .. 650 milliGRAM(s) Oral every 6 hours PRN  lactulose Syrup 20 Gram(s) Oral daily PRN  oxyCODONE    IR 5 milliGRAM(s) Oral every 6 hours PRN    VITALS:   T(F): 96  HR: 85  BP: 107/53  RR: 18  SpO2: 97%    LABS:                        12.3   3.80  )-----------( 67       ( 08 Dec 2018 07:52 )             37.4             Urinalysis Basic - ( 07 Dec 2018 18:30 )    Color: Yellow / Appearance: Clear / S.020 / pH: x  Gluc: x / Ketone: Negative  / Bili: Negative / Urobili: 0.2 mg/dL   Blood: x / Protein: Negative mg/dL / Nitrite: Negative   Leuk Esterase: Small / RBC: x / WBC 3-5 /HPF   Sq Epi: x / Non Sq Epi: x / Bacteria: Few /HPF      ABG - ( 07 Dec 2018 04:12 )  pH, Arterial: 7.48  pH, Blood: x     /  pCO2: 42    /  pO2: 78    / HCO3: 31    / Base Excess: 6.4   /  SaO2: 96                PHYSICAL EXAM:        General: somnolent, slow speech  Neurology: A&Ox3, left sided weak sensation  Head:  Normocephalic, atraumatic, limited motion of neck  ENT:  Mucosa moist, no ulcerations  Neck:  Supple, no JVD,   Resp: CTA B/L  CV: RRR, S1S2,   GI: Soft, NT, bowel sounds  MS: No edema, + peripheral pulses, FROM all 4 extremity          Assessment and Plan    #Recurrent falls and episodes of nonresponsiveness  R/o seizures vs arrhythmias vs pseudoseizures  Still episodicallty confused/ somnolent  EEG normal/ VEEG normal  valproic acid level WNL  Will check orthostatics  Psychiatry consulted : NO danger to self or others    #Bradycardia/ PACs  Episodes of HR 35-40 on monitor and frequent PACs  TSH 4.9/ check FT4 FT3  EP consulted: No need for loop recorder or pacemaker for now  ABGs WNL  Pending TTE    #Pancytopenia  R/o depakote and respiridone side effect  valproate level WNL  Switched depakote to Vimpat 100 BID  Oncology following: most probably drug side effect/ check B12 / folate/ HIV    #C5 compression deformity  As per Neurosurgery. Pain management and PT/ No need for MRI of spine.    #Hypomagnesemia  Receiving PO MgOx    #Disposition  Pending PT rehab/ back to SNF

## 2018-12-09 LAB
GLUCOSE BLDC GLUCOMTR-MCNC: 110 MG/DL — HIGH (ref 70–99)
TROPONIN T SERPL-MCNC: <0.01 NG/ML — SIGNIFICANT CHANGE UP

## 2018-12-09 RX ADMIN — CLOPIDOGREL BISULFATE 75 MILLIGRAM(S): 75 TABLET, FILM COATED ORAL at 12:08

## 2018-12-09 RX ADMIN — MAGNESIUM OXIDE 400 MG ORAL TABLET 400 MILLIGRAM(S): 241.3 TABLET ORAL at 12:07

## 2018-12-09 RX ADMIN — LATANOPROST 1 DROP(S): 0.05 SOLUTION/ DROPS OPHTHALMIC; TOPICAL at 22:10

## 2018-12-09 RX ADMIN — ATORVASTATIN CALCIUM 80 MILLIGRAM(S): 80 TABLET, FILM COATED ORAL at 21:32

## 2018-12-09 RX ADMIN — ENOXAPARIN SODIUM 40 MILLIGRAM(S): 100 INJECTION SUBCUTANEOUS at 12:08

## 2018-12-09 RX ADMIN — Medication 5 MILLIGRAM(S): at 18:17

## 2018-12-09 RX ADMIN — Medication 1 PATCH: at 07:39

## 2018-12-09 RX ADMIN — Medication 1 PATCH: at 12:07

## 2018-12-09 RX ADMIN — Medication 50 MICROGRAM(S): at 05:37

## 2018-12-09 RX ADMIN — LACOSAMIDE 100 MILLIGRAM(S): 50 TABLET ORAL at 05:47

## 2018-12-09 RX ADMIN — CHLORHEXIDINE GLUCONATE 1 APPLICATION(S): 213 SOLUTION TOPICAL at 05:37

## 2018-12-09 RX ADMIN — Medication 5 MILLIGRAM(S): at 05:37

## 2018-12-09 RX ADMIN — GABAPENTIN 600 MILLIGRAM(S): 400 CAPSULE ORAL at 21:32

## 2018-12-09 RX ADMIN — FAMOTIDINE 20 MILLIGRAM(S): 10 INJECTION INTRAVENOUS at 05:37

## 2018-12-09 RX ADMIN — FAMOTIDINE 20 MILLIGRAM(S): 10 INJECTION INTRAVENOUS at 18:17

## 2018-12-09 RX ADMIN — Medication 1 PATCH: at 14:14

## 2018-12-09 RX ADMIN — RISPERIDONE 2 MILLIGRAM(S): 4 TABLET ORAL at 21:32

## 2018-12-09 RX ADMIN — MIRTAZAPINE 15 MILLIGRAM(S): 45 TABLET, ORALLY DISINTEGRATING ORAL at 21:32

## 2018-12-09 RX ADMIN — LACOSAMIDE 100 MILLIGRAM(S): 50 TABLET ORAL at 18:16

## 2018-12-09 RX ADMIN — GABAPENTIN 600 MILLIGRAM(S): 400 CAPSULE ORAL at 14:17

## 2018-12-09 RX ADMIN — Medication 100 MILLIGRAM(S): at 18:17

## 2018-12-09 RX ADMIN — GABAPENTIN 600 MILLIGRAM(S): 400 CAPSULE ORAL at 05:37

## 2018-12-09 RX ADMIN — MAGNESIUM OXIDE 400 MG ORAL TABLET 400 MILLIGRAM(S): 241.3 TABLET ORAL at 18:17

## 2018-12-09 NOTE — CONSULT NOTE ADULT - REASON FOR ADMISSION
Recurrent Falls
Recurrent Falls
Fall
Recurrent Falls

## 2018-12-09 NOTE — PROGRESS NOTE ADULT - ASSESSMENT
Assessment and Plan    #Recurrent falls and episodes of nonresponsiveness  R/o seizures vs arrhythmias vs pseudoseizures@@@ had rapid response   Still episodicallty confused/ somnolent  EEG normal/ VEEG normal  valproic acid level WNL  Will check orthostatics  Psychiatry consulted : NO danger to self or others    #Bradycardia/ PACs  Episodes of HR 35-40 on monitor and frequent PACs  TSH 4.9/ check FT4 FT3  EP consulted: No need for loop recorder or pacemaker for now  ABGs WNL  Pending TTE    #Pancytopenia  R/o depakote and respiridone side effect  valproate level WNL  Switched depakote to Vimpat 100 BID  Oncology following: most probably drug side effect/ check B12 / folate/ HIV    #C5 compression deformity  As per Neurosurgery. Pain management and PT/ No need for MRI of spine.    #Hypomagnesemia  Receiving PO MgOx  #Disposition  Pending PT rehab/ back to SNF

## 2018-12-09 NOTE — CHART NOTE - NSCHARTNOTEFT_GEN_A_CORE
Rapid response called at 8AM for patient having alteration of consciousness.     Patient with known history of pseudoseizures found by nurse to be AAOx0 with possible mild facial droop. Patient found to be nonresponsive with vitals WNL. Please also see rapid response event log in paper chart     PE:   General: non responsive  Cardiac: RRR, S1, SE  Pulm: CTA, No w/r/r  Abdomen: Soft, nontender, nondistended  Neuro: Slight left sided facial droop, lethargic, not moving extremities     Code Stroke called and patient sent for stat head CT     Neurology evaluated patient.     Impression: Pseudoseizure vs seizure vs stroke    Plan:  If reoccurring advise patient you will intubate her to have her break out of it. Assess limbs by attempting to drop UE over her face. If she stops her arm it is a pesudoseizure. If not, will need to investigate further.

## 2018-12-09 NOTE — PROGRESS NOTE ADULT - SUBJECTIVE AND OBJECTIVE BOX
GEOVANY ROMERO  73y  Female    Patient is a 73y old  Female who presents with a chief complaint of Recurrent Falls (08 Dec 2018 13:36)  had rapid response ed/w resident 	  ALLERGIES:  Dilantin (Unknown)  Keppra (Unknown)  penicillin (Unknown)  phenobarbital (Unknown)  Tegretol (Unknown)  Topamax (Drowsiness)      INTERVAL HPI/OVERNIGHT EVENTS:    VITALS:  T(F): 97 (12-09-18 @ 05:30), Max: 98.5 (12-08-18 @ 20:34)  HR: 70 (12-09-18 @ 06:16) (64 - 73)  BP: 142/59 (12-09-18 @ 06:16) (96/51 - 142/59)  RR: 18 (12-09-18 @ 06:16) (18 - 18)  SpO2: --    LABS:        MICROBIOLOGY:    MEDICATION:  acetaminophen   Tablet .. 650 milliGRAM(s) Oral every 6 hours PRN  atorvastatin 80 milliGRAM(s) Oral at bedtime  chlorhexidine 4% Liquid 1 Application(s) Topical <User Schedule>  clopidogrel Tablet 75 milliGRAM(s) Oral daily  docusate sodium 100 milliGRAM(s) Oral two times a day  enoxaparin Injectable 40 milliGRAM(s) SubCutaneous every 24 hours  famotidine    Tablet 20 milliGRAM(s) Oral two times a day  gabapentin 600 milliGRAM(s) Oral three times a day  ipratropium 17 MICROgram(s) HFA Inhaler 1 Puff(s) Inhalation <User Schedule>  lacosamide 100 milliGRAM(s) Oral two times a day  lactulose Syrup 20 Gram(s) Oral daily PRN  latanoprost 0.005% Ophthalmic Solution 1 Drop(s) Both EYES at bedtime  levothyroxine 50 MICROGram(s) Oral daily  magnesium oxide 400 milliGRAM(s) Oral three times a day with meals  mirtazapine 15 milliGRAM(s) Oral at bedtime  nicotine -  14 mG/24Hr(s) Patch 1 patch Transdermal daily  oxybutynin 5 milliGRAM(s) Oral two times a day  oxyCODONE    IR 5 milliGRAM(s) Oral every 6 hours PRN  risperiDONE   Tablet 2 milliGRAM(s) Oral at bedtime    RADIOLOGY & ADDITIONAL TESTS:

## 2018-12-09 NOTE — CONSULT NOTE ADULT - CONSULT REQUESTED DATE/TIME
05-Dec-2018 02:03
05-Dec-2018 02:25
05-Dec-2018 18:23
06-Dec-2018 17:05
07-Dec-2018 09:33
09-Dec-2018 10:52
05-Dec-2018 12:25
07-Dec-2018 13:42

## 2018-12-09 NOTE — CONSULT NOTE ADULT - ASSESSMENT
74 yo female with pmh of pseudoseizure, schitzoeffective disorder, HTN and DLD was admitted  for frequent falls. Patient was called a stroke code this morning for increasing LE weakness and slurred speech. Was not a tpa candidate since she got back to her baseline with negative CTH.    Plan:   Continue monitoring  Frequent neuro checks Q4hrs 72 yo female with pmh of pseudoseizure, schitzoeffective disorder, HTN and DLD was admitted  for frequent falls. Patient was called a stroke code this morning for increasing LE weakness and slurred speech. Was not a tpa candidate since she got back to her baseline with negative CTH.    Plan:   Continue monitoring  Frequent neuro checks Q4hrs  psych f/u for supratentorial   no further inpt neurologic w/u

## 2018-12-09 NOTE — CONSULT NOTE ADULT - SUBJECTIVE AND OBJECTIVE BOX
Neurology Stroke Note    stroke code was activated for the patient this mornind due to increasing weakness and slurred speech. CTH was negative. Patient got back to her baseline that's why she was not tPA candidate.  Currently awake alert and oriented x3. Moving all 4 extremities. No current complains.  NIHS is 0.      PAST MEDICAL & SURGICAL HISTORY:  Bipolar disorder  Glaucoma, unspecified glaucoma type, unspecified laterality  Anemia  Schizoaffective disorder, depressive type  Vertigo  Pseudoseizure  COPD (chronic obstructive pulmonary disease)  HLD (hyperlipidemia)  CVA (cerebral vascular accident)  H/O:   History of ankle surgery  History of inguinal herniorrhaphy      FAMILY HISTORY:  Family history of cerebrovascular accident (CVA) (Sibling)      Social History: (-) x 3    Allergies    Dilantin (Unknown)  Keppra (Unknown)  penicillin (Unknown)  phenobarbital (Unknown)  Tegretol (Unknown)    Intolerances    Topamax (Drowsiness)      MEDICATIONS  (STANDING):  atorvastatin 80 milliGRAM(s) Oral at bedtime  chlorhexidine 4% Liquid 1 Application(s) Topical <User Schedule>  clopidogrel Tablet 75 milliGRAM(s) Oral daily  docusate sodium 100 milliGRAM(s) Oral two times a day  enoxaparin Injectable 40 milliGRAM(s) SubCutaneous every 24 hours  famotidine    Tablet 20 milliGRAM(s) Oral two times a day  gabapentin 600 milliGRAM(s) Oral three times a day  ipratropium 17 MICROgram(s) HFA Inhaler 1 Puff(s) Inhalation <User Schedule>  lacosamide 100 milliGRAM(s) Oral two times a day  latanoprost 0.005% Ophthalmic Solution 1 Drop(s) Both EYES at bedtime  levothyroxine 50 MICROGram(s) Oral daily  magnesium oxide 400 milliGRAM(s) Oral three times a day with meals  mirtazapine 15 milliGRAM(s) Oral at bedtime  nicotine -  14 mG/24Hr(s) Patch 1 patch Transdermal daily  oxybutynin 5 milliGRAM(s) Oral two times a day  risperiDONE   Tablet 2 milliGRAM(s) Oral at bedtime    MEDICATIONS  (PRN):  acetaminophen   Tablet .. 650 milliGRAM(s) Oral every 6 hours PRN Moderate Pain (4 - 6)  lactulose Syrup 20 Gram(s) Oral daily PRN For constipation  oxyCODONE    IR 5 milliGRAM(s) Oral every 6 hours PRN Severe Pain (7 - 10)        Vital Signs Last 24 Hrs  T(C): 36.1 (09 Dec 2018 05:30), Max: 36.9 (08 Dec 2018 14:40)  T(F): 97 (09 Dec 2018 05:30), Max: 98.5 (08 Dec 2018 20:34)  HR: 70 (09 Dec 2018 06:16) (64 - 73)  BP: 142/59 (09 Dec 2018 06:16) (96/51 - 142/59)  BP(mean): --  RR: 18 (09 Dec 2018 06:16) (18 - 18)  SpO2: --    Neurologic Examination:  General:  Appearance is consistent with chronologic age.  No abnormal facies.   General: The patient is oriented to person, place, time and date.    Language with normal repetition, comprehension and naming.  Nondysarthric.    Cranial nerves: intact VA, VFF.  EOMI w/o nystagmus, skew or reported double vision.  PERRL.  No ptosis/weakness of eyelid closure.  Facial sensation is normal with normal bite.  No facial asymmetry.  Hearing grossly intact b/l.  Palate elevates midline.  Tongue midline.  Motor examination:   Normal tone, bulk and range of motion.  No tenderness, twitching, tremors or involuntary movements.  Formal Muscle Strength Testing: (MRC grade R/L) 5-/5 UE; 4/5 LE.  No observable drift.  Reflexes:   2+ b/l pectoralis, biceps, triceps, brachioradialis, patella and Achilles.  Plantar response downgoing b/l.    Sensory examination:   grossly intact to light touch  Cerebellum:   FTN/HKS intact with normal THOMAS in all limbs.  No dysmetria.    Labs:   CBC Full  -  ( 08 Dec 2018 07:52 )  WBC Count : 3.80 K/uL  Hemoglobin : 12.3 g/dL  Hematocrit : 37.4 %  Platelet Count - Automated : 67 K/uL  Mean Cell Volume : 93.0 fL  Mean Cell Hemoglobin : 30.6 pg  Mean Cell Hemoglobin Concentration : 32.9 g/dL  Auto Neutrophil # : 1.75 K/uL  Auto Lymphocyte # : 1.30 K/uL  Auto Monocyte # : 0.62 K/uL  Auto Eosinophil # : 0.10 K/uL  Auto Basophil # : 0.02 K/uL  Auto Neutrophil % : 46.1 %  Auto Lymphocyte % : 34.2 %  Auto Monocyte % : 16.3 %  Auto Eosinophil % : 2.6 %  Auto Basophil % : 0.5 %              Urinalysis Basic - ( 07 Dec 2018 18:30 )    Color: Yellow / Appearance: Clear / S.020 / pH: x  Gluc: x / Ketone: Negative  / Bili: Negative / Urobili: 0.2 mg/dL   Blood: x / Protein: Negative mg/dL / Nitrite: Negative   Leuk Esterase: Small / RBC: x / WBC 3-5 /HPF   Sq Epi: x / Non Sq Epi: x / Bacteria: Few /HPF          Neuroimaging:  NCHCT: CT Head No Cont:   EXAM:  CT BRAIN            PROCEDURE DATE:  2018            INTERPRETATION:  CLINICAL HISTORY / REASON FOR EXAM: Stroke code. Altered   mental status. History of pseudoseizures.    TECHNIQUE: Multiple axial CT images of the head were obtainedfrom the   base of the skull to the vertex without the administration of IV   contrast. Sagittal and coronal reformats were submitted.    COMPARISON: CT head without contrast from 2018.    FINDINGS:    Ventricles and cortical sulci demonstrate stable moderate atrophic   changes.    There is no evidence of acute intracranial hemorrhage, mass effect or   midline shift.    Gray-white differentiation is maintained. There are patchy subcortical   white matter hypodensities consistent with mild chronic microvascular   ischemic changes.    The bones are intact. Mastoid air cells are well aerated bilaterally. The   visualized portions of the paranasal sinuses are unremarkable.    Beam hardening artifact is noted overlying the brain stem and posterior   fossa which is inherent to CT in this location.      IMPRESSION:     1.  No CT evidence of acute intracranial hemorrhage or large territory   infarct. Stable exam since 2018.    2.  Mild chronic microvascular ischemic changes.    Dr. Carisa Baer discussed preliminary findings with PRINCESS DAVID on   2018 8:51 AM with readback.              CARISA BAER M.D., RESIDENT RADIOLOGIST  This document has been electronically signed.  NIKITA WOLF M.D., ATTENDING RADIOLOGIST  This document has been electronically signed. Dec  9 2018  9:43AM            . Neurology Stroke Note    stroke code was activated for the patient this mornind due to increasing weakness and slurred speech. CTH was negative. Patient got back to her baseline that's why she was not tPA candidate.  Currently awake alert and oriented x3. Moving all 4 extremities. No current complains.    NIHSS 0.    PAST MEDICAL & SURGICAL HISTORY:  Bipolar disorder  Glaucoma, unspecified glaucoma type, unspecified laterality  Anemia  Schizoaffective disorder, depressive type  Vertigo  Pseudoseizure  COPD (chronic obstructive pulmonary disease)  HLD (hyperlipidemia)  CVA (cerebral vascular accident)  H/O:   History of ankle surgery  History of inguinal herniorrhaphy    FAMILY HISTORY:  Family history of cerebrovascular accident (CVA) (Sibling)    Social History: (-) x 3    Allergies    Dilantin (Unknown)  Keppra (Unknown)  penicillin (Unknown)  phenobarbital (Unknown)  Tegretol (Unknown)    Intolerances    Topamax (Drowsiness)      MEDICATIONS  (STANDING):  atorvastatin 80 milliGRAM(s) Oral at bedtime  chlorhexidine 4% Liquid 1 Application(s) Topical <User Schedule>  clopidogrel Tablet 75 milliGRAM(s) Oral daily  docusate sodium 100 milliGRAM(s) Oral two times a day  enoxaparin Injectable 40 milliGRAM(s) SubCutaneous every 24 hours  famotidine    Tablet 20 milliGRAM(s) Oral two times a day  gabapentin 600 milliGRAM(s) Oral three times a day  ipratropium 17 MICROgram(s) HFA Inhaler 1 Puff(s) Inhalation <User Schedule>  lacosamide 100 milliGRAM(s) Oral two times a day  latanoprost 0.005% Ophthalmic Solution 1 Drop(s) Both EYES at bedtime  levothyroxine 50 MICROGram(s) Oral daily  magnesium oxide 400 milliGRAM(s) Oral three times a day with meals  mirtazapine 15 milliGRAM(s) Oral at bedtime  nicotine -  14 mG/24Hr(s) Patch 1 patch Transdermal daily  oxybutynin 5 milliGRAM(s) Oral two times a day  risperiDONE   Tablet 2 milliGRAM(s) Oral at bedtime    MEDICATIONS  (PRN):  acetaminophen   Tablet .. 650 milliGRAM(s) Oral every 6 hours PRN Moderate Pain (4 - 6)  lactulose Syrup 20 Gram(s) Oral daily PRN For constipation  oxyCODONE    IR 5 milliGRAM(s) Oral every 6 hours PRN Severe Pain (7 - 10)    Vital Signs Last 24 Hrs  T(C): 36.1 (09 Dec 2018 05:30), Max: 36.9 (08 Dec 2018 14:40)  T(F): 97 (09 Dec 2018 05:30), Max: 98.5 (08 Dec 2018 20:34)  HR: 70 (09 Dec 2018 06:16) (64 - 73)  BP: 142/59 (09 Dec 2018 06:16) (96/51 - 142/59)  BP(mean): --  RR: 18 (09 Dec 2018 06:16) (18 - 18)  SpO2: --    Neurologic Examination:  General:  Appearance is consistent with chronologic age.  No abnormal facies.   General: The patient is oriented to person, place, time and date.    Language with normal repetition, comprehension and naming.  Nondysarthric.    Cranial nerves: intact VA, VFF.  EOMI w/o nystagmus, skew or reported double vision.  PERRL.  No ptosis/weakness of eyelid closure.  Facial sensation is normal with normal bite.  No facial asymmetry.  Hearing grossly intact b/l.  Palate elevates midline.  Tongue midline.  Motor examination:   Normal tone, bulk and range of motion.  No tenderness, twitching, tremors or involuntary movements.  Formal Muscle Strength Testing: (MRC grade R/L) 5-/5 UE; 4/5 LE.  No observable drift.  Reflexes:   2+ b/l pectoralis, biceps, triceps, brachioradialis, patella and Achilles.  Plantar response downgoing b/l.    Sensory examination:   grossly intact to light touch  Cerebellum:   FTN/HKS intact with normal THOMAS in all limbs.  No dysmetria.    Labs:   CBC Full  -  ( 08 Dec 2018 07:52 )  WBC Count : 3.80 K/uL  Hemoglobin : 12.3 g/dL  Hematocrit : 37.4 %  Platelet Count - Automated : 67 K/uL  Mean Cell Volume : 93.0 fL  Mean Cell Hemoglobin : 30.6 pg  Mean Cell Hemoglobin Concentration : 32.9 g/dL  Auto Neutrophil # : 1.75 K/uL  Auto Lymphocyte # : 1.30 K/uL  Auto Monocyte # : 0.62 K/uL  Auto Eosinophil # : 0.10 K/uL  Auto Basophil # : 0.02 K/uL  Auto Neutrophil % : 46.1 %  Auto Lymphocyte % : 34.2 %  Auto Monocyte % : 16.3 %  Auto Eosinophil % : 2.6 %  Auto Basophil % : 0.5 %    Urinalysis Basic - ( 07 Dec 2018 18:30 )    Color: Yellow / Appearance: Clear / S.020 / pH: x  Gluc: x / Ketone: Negative  / Bili: Negative / Urobili: 0.2 mg/dL   Blood: x / Protein: Negative mg/dL / Nitrite: Negative   Leuk Esterase: Small / RBC: x / WBC 3-5 /HPF   Sq Epi: x / Non Sq Epi: x / Bacteria: Few /HPF          Neuroimaging:  NCHCT: CT Head No Cont:   EXAM:  CT BRAIN          PROCEDURE DATE:  2018      INTERPRETATION:  CLINICAL HISTORY / REASON FOR EXAM: Stroke code. Altered   mental status. History of pseudoseizures.    TECHNIQUE: Multiple axial CT images of the head were obtainedfrom the   base of the skull to the vertex without the administration of IV   contrast. Sagittal and coronal reformats were submitted.    COMPARISON: CT head without contrast from 2018.    FINDINGS:    Ventricles and cortical sulci demonstrate stable moderate atrophic   changes.    There is no evidence of acute intracranial hemorrhage, mass effect or   midline shift.    Gray-white differentiation is maintained. There are patchy subcortical   white matter hypodensities consistent with mild chronic microvascular   ischemic changes.    The bones are intact. Mastoid air cells are well aerated bilaterally. The   visualized portions of the paranasal sinuses are unremarkable.    Beam hardening artifact is noted overlying the brain stem and posterior   fossa which is inherent to CT in this location.    IMPRESSION:     1.  No CT evidence of acute intracranial hemorrhage or large territory   infarct. Stable exam since 2018.    2.  Mild chronic microvascular ischemic changes.    Dr. Carisa Baer discussed preliminary findings with PRINCESS DAVID on   2018 8:51 AM with readback.    CARISA BAER M.D., RESIDENT RADIOLOGIST  This document has been electronically signed.  NIKITA WOLF M.D., ATTENDING RADIOLOGIST  This document has been electronically signed. Dec  9 2018  9:43AM            .

## 2018-12-09 NOTE — CONSULT NOTE ADULT - CONSULT REASON
Compression fracture
Fall
Recurrent falls
bradycardia
cardio recs loop recorder
stroke code
Pancytopenia
comp fx

## 2018-12-10 ENCOUNTER — TRANSCRIPTION ENCOUNTER (OUTPATIENT)
Age: 73
End: 2018-12-10

## 2018-12-10 VITALS
RESPIRATION RATE: 20 BRPM | TEMPERATURE: 97 F | DIASTOLIC BLOOD PRESSURE: 89 MMHG | SYSTOLIC BLOOD PRESSURE: 115 MMHG | HEART RATE: 90 BPM

## 2018-12-10 LAB
FOLATE SERPL-MCNC: 10.4 NG/ML — SIGNIFICANT CHANGE UP
TROPONIN T SERPL-MCNC: <0.01 NG/ML — SIGNIFICANT CHANGE UP
VIT B12 SERPL-MCNC: 690 PG/ML — SIGNIFICANT CHANGE UP (ref 232–1245)

## 2018-12-10 PROCEDURE — 93306 TTE W/DOPPLER COMPLETE: CPT | Mod: 26

## 2018-12-10 RX ORDER — LACOSAMIDE 50 MG/1
1 TABLET ORAL
Qty: 0 | Refills: 0 | DISCHARGE
Start: 2018-12-10

## 2018-12-10 RX ORDER — DIVALPROEX SODIUM 500 MG/1
1 TABLET, DELAYED RELEASE ORAL
Qty: 0 | Refills: 0 | COMMUNITY

## 2018-12-10 RX ORDER — MECLIZINE HCL 12.5 MG
1 TABLET ORAL
Qty: 0 | Refills: 0 | COMMUNITY

## 2018-12-10 RX ADMIN — GABAPENTIN 600 MILLIGRAM(S): 400 CAPSULE ORAL at 13:26

## 2018-12-10 RX ADMIN — Medication 5 MILLIGRAM(S): at 17:09

## 2018-12-10 RX ADMIN — Medication 5 MILLIGRAM(S): at 05:42

## 2018-12-10 RX ADMIN — ENOXAPARIN SODIUM 40 MILLIGRAM(S): 100 INJECTION SUBCUTANEOUS at 13:26

## 2018-12-10 RX ADMIN — MAGNESIUM OXIDE 400 MG ORAL TABLET 400 MILLIGRAM(S): 241.3 TABLET ORAL at 11:31

## 2018-12-10 RX ADMIN — Medication 100 MILLIGRAM(S): at 05:42

## 2018-12-10 RX ADMIN — FAMOTIDINE 20 MILLIGRAM(S): 10 INJECTION INTRAVENOUS at 05:42

## 2018-12-10 RX ADMIN — Medication 1 PATCH: at 11:30

## 2018-12-10 RX ADMIN — MAGNESIUM OXIDE 400 MG ORAL TABLET 400 MILLIGRAM(S): 241.3 TABLET ORAL at 17:09

## 2018-12-10 RX ADMIN — CLOPIDOGREL BISULFATE 75 MILLIGRAM(S): 75 TABLET, FILM COATED ORAL at 11:32

## 2018-12-10 RX ADMIN — LACOSAMIDE 100 MILLIGRAM(S): 50 TABLET ORAL at 17:19

## 2018-12-10 RX ADMIN — MAGNESIUM OXIDE 400 MG ORAL TABLET 400 MILLIGRAM(S): 241.3 TABLET ORAL at 13:26

## 2018-12-10 RX ADMIN — GABAPENTIN 600 MILLIGRAM(S): 400 CAPSULE ORAL at 05:42

## 2018-12-10 RX ADMIN — LACOSAMIDE 100 MILLIGRAM(S): 50 TABLET ORAL at 05:42

## 2018-12-10 RX ADMIN — Medication 1 PATCH: at 11:32

## 2018-12-10 RX ADMIN — Medication 50 MICROGRAM(S): at 05:42

## 2018-12-10 RX ADMIN — CHLORHEXIDINE GLUCONATE 1 APPLICATION(S): 213 SOLUTION TOPICAL at 05:42

## 2018-12-10 RX ADMIN — FAMOTIDINE 20 MILLIGRAM(S): 10 INJECTION INTRAVENOUS at 17:10

## 2018-12-10 RX ADMIN — Medication 100 MILLIGRAM(S): at 17:10

## 2018-12-10 NOTE — DISCHARGE NOTE ADULT - MEDICATION SUMMARY - MEDICATIONS TO TAKE
I will START or STAY ON the medications listed below when I get home from the hospital:    gabapentin 600 mg oral tablet  -- 1 tab(s) by mouth 3 times a day  -- Indication: For Seizure    lacosamide 100 mg oral tablet  -- 1 tab(s) by mouth 2 times a day  -- Indication: For Seizure    mirtazapine 15 mg oral tablet  -- 1 tab(s) by mouth once a day (at bedtime)  -- Indication: For Depression    Lipitor 80 mg oral tablet  -- Indication: For DLD    Plavix 75 mg oral tablet  -- 1 tab(s) by mouth once a day  -- Indication: For CVA    RisperDAL 2 mg oral tablet  -- 1 tab(s) by mouth once a day (at bedtime)  -- Indication: For Schizoaafective     Atrovent 18 mcg/inh inhalation aerosol  -- 2 puff(s) inhaled 2 times a day  -- Indication: For COPD    Pepcid 20 mg oral tablet  -- 1 tab(s) by mouth 2 times a day   -- It is very important that you take or use this exactly as directed.  Do not skip doses or discontinue unless directed by your doctor.  Obtain medical advice before taking any non-prescription drugs as some may affect the action of this medication.    -- Indication: For Reflux    lactulose 10 g/15 mL oral solution  -- 30 milliliter(s) by mouth once a day, As Needed  -- Indication: For Constpation    Colace 100 mg oral capsule  -- 1 cap(s) by mouth once a day  -- Indication: For COnstipation    Xalatan 0.005% ophthalmic solution  -- 1 drop(s) to each affected eye once a day (in the evening)  -- Indication: For Glaucoma    Synthroid 50 mcg (0.05 mg) oral tablet  -- 1 tab(s) by mouth once a day  -- Indication: For Hypothyroidism    VESIcare 10 mg oral tablet  -- 1 tab(s) by mouth once a day  -- Indication: For Urinary incontinence

## 2018-12-10 NOTE — PROGRESS NOTE ADULT - SUBJECTIVE AND OBJECTIVE BOX
Patient was seen and examined. Spoke with RN. Chart reviewed.  No events overnight.  Vital Signs Last 24 Hrs  T(F): 96.2 (10 Dec 2018 05:03), Max: 97.3 (09 Dec 2018 13:57)  HR: 73 (10 Dec 2018 05:03) (73 - 84)  BP: 125/60 (10 Dec 2018 05:03) (106/56 - 125/60)  SpO2: --  MEDICATIONS  (STANDING):  atorvastatin 80 milliGRAM(s) Oral at bedtime  chlorhexidine 4% Liquid 1 Application(s) Topical <User Schedule>  clopidogrel Tablet 75 milliGRAM(s) Oral daily  docusate sodium 100 milliGRAM(s) Oral two times a day  enoxaparin Injectable 40 milliGRAM(s) SubCutaneous every 24 hours  famotidine    Tablet 20 milliGRAM(s) Oral two times a day  gabapentin 600 milliGRAM(s) Oral three times a day  lacosamide 100 milliGRAM(s) Oral two times a day  latanoprost 0.005% Ophthalmic Solution 1 Drop(s) Both EYES at bedtime  levothyroxine 50 MICROGram(s) Oral daily  magnesium oxide 400 milliGRAM(s) Oral three times a day with meals  mirtazapine 15 milliGRAM(s) Oral at bedtime  nicotine -  14 mG/24Hr(s) Patch 1 patch Transdermal daily  oxybutynin 5 milliGRAM(s) Oral two times a day  risperiDONE   Tablet 2 milliGRAM(s) Oral at bedtime    MEDICATIONS  (PRN):  acetaminophen   Tablet .. 650 milliGRAM(s) Oral every 6 hours PRN Moderate Pain (4 - 6)  lactulose Syrup 20 Gram(s) Oral daily PRN For constipation  oxyCODONE    IR 5 milliGRAM(s) Oral every 6 hours PRN Severe Pain (7 - 10)    Labs:                General: comfortable, NAD  Neurology: nonfocal  Head:  Normocephalic, atraumatic  ENT:  Mucosa moist, no ulcerations  Neck:  Supple, no JVD,   Skin: no breakdowns (as per RN)  Resp: CTA B/L  CV: RRR, S1S2,   GI: Soft, NT, bowel sounds  MS: No edema, + peripheral pulses,      A/P:  72 yo woman with recurrent falls and episodes of nonresponsiveness    R/o seizures vs arrhythmias vs pseudoseizures  Still episodically confused/ somnolent  EEG normal/ VEEG normal  valproic acid level WNL  Will check orthostatics  Psychiatry consulted : NO danger to self or others    #Bradycardia/ PACs  Episodes of HR 35-40 on monitor and frequent PACs  TSH 4.9/ check FT4 FT3  EP consulted: No need for loop recorder or pacemaker for now  ABGs WNL  Pending TTE    #Pancytopenia  R/o depakote and respiridone side effect  valproate level WNL  Switched depakote to Vimpat 100 BID  Oncology following: most probably drug side effect/ check B12 / folate/ HIV    #C5 compression deformity  As per Neurosurgery. Pain management and PT/ No need for MRI of spine.    #Disposition  Pending PT rehab/ back to SNF if cleared by cardio and neuro    DVT prophylaxis  Decubitus prevention- all measures as per RN protocol  Please call or text me with any questions or updates

## 2018-12-10 NOTE — DISCHARGE NOTE ADULT - PLAN OF CARE
Prevent multiple episodes of unresponsiveness during hospital stay  Workup including imaging, EEG, VEEG negative  telemetry only detecting episodic PACs  No need for further workup per neurology and EP Treat possibly medication induced  Depakote changed to vimpat pain control Pain control and neck exercises

## 2018-12-10 NOTE — PHYSICAL THERAPY INITIAL EVALUATION ADULT - GENERAL OBSERVATIONS, REHAB EVAL
9:12-10:12 60 min   pt rec in bed in NAD, pt was lethargic, but as session went on, pt became more awake, speaking more clearly, pt had no c/o

## 2018-12-10 NOTE — PHYSICAL THERAPY INITIAL EVALUATION ADULT - PERTINENT HX OF CURRENT PROBLEM, REHAB EVAL
pt adm for frequent falls, T5 compression fx, age indeterminate, episodes of unresponsiveness, r/o seizure, arrhythmia, pseudoseizure

## 2018-12-10 NOTE — PHYSICAL THERAPY INITIAL EVALUATION ADULT - LEVEL OF INDEPENDENCE: SUPINE/SIT, REHAB EVAL
maximum assist (25% patients effort)/pt unable to hole herself st the EOB without assist, PT donned TLSO brace

## 2018-12-10 NOTE — DISCHARGE NOTE ADULT - PATIENT PORTAL LINK FT
You can access the [facility] Patient Portal, offered by [facility], by registering with the following website: [email or web address]

## 2018-12-10 NOTE — PROGRESS NOTE ADULT - REASON FOR ADMISSION
Recurrent Falls

## 2018-12-10 NOTE — PHYSICAL THERAPY INITIAL EVALUATION ADULT - GAIT DISTANCE, PT EVAL
bed to chair/3 steps x 1, pt with poor standing balance, pt left in the chair with pillows supporting her on B sides, pt only able to msit for n~5 min 2* transporter present to take pt to echo, PCA and PT transferred pt back to bed

## 2018-12-10 NOTE — DISCHARGE NOTE ADULT - MEDICATION SUMMARY - MEDICATIONS TO STOP TAKING
I will STOP taking the medications listed below when I get home from the hospital:    Depakote 500 mg oral delayed release tablet  -- 1 tab(s) by mouth 3 times a day

## 2018-12-10 NOTE — DISCHARGE NOTE ADULT - HOSPITAL COURSE
Patient is a 73y old lady known to have schizoaffective disorder, bipolar, hypothyroidism, COPD, history of CVA who presents with a chief complaint of Recurrent Falls. Patient had 2 episodes of self-resolving unresponsiveness and seizure-like activity while in hospital.    #Recurrent falls and episodes of nonresponsiveness  R/o pseudoseizures  EEG normal/ VEEG normal  valproic acid level WNL  Psychiatry consulted : No danger to self or others  Neurology: no more need for inpatient neurologic workup    #Bradycardia/ PACs  Episodic PACs  TSH 4.9/ follow up FT4 FT3  EP consulted: No need for loop recorder or pacemaker for now    #Pancytopenia  R/o depakote and respiridone side effect  valproate level WNL  Switched depakote to Vimpat 100 BID  Oncology following: most probably drug side effect/ follow up B12 / folate/ HIV    #C5 compression deformity  As per Neurosurgery. Pain management and PT/ No need for MRI of spine.

## 2018-12-10 NOTE — DISCHARGE NOTE ADULT - CARE PLAN
Principal Discharge DX:	Pseudoseizure  Goal:	Prevent  Assessment and plan of treatment:	multiple episodes of unresponsiveness during hospital stay  Workup including imaging, EEG, VEEG negative  telemetry only detecting episodic PACs  No need for further workup per neurology and EP  Secondary Diagnosis:	Pancytopenia  Goal:	Treat  Assessment and plan of treatment:	possibly medication induced  Depakote changed to vimpat  Secondary Diagnosis:	Compression fracture of C-spine  Goal:	pain control  Assessment and plan of treatment:	Pain control and neck exercises

## 2018-12-13 DIAGNOSIS — R29.6 REPEATED FALLS: ICD-10-CM

## 2018-12-13 DIAGNOSIS — R00.1 BRADYCARDIA, UNSPECIFIED: ICD-10-CM

## 2018-12-13 DIAGNOSIS — Z88.0 ALLERGY STATUS TO PENICILLIN: ICD-10-CM

## 2018-12-13 DIAGNOSIS — F17.210 NICOTINE DEPENDENCE, CIGARETTES, UNCOMPLICATED: ICD-10-CM

## 2018-12-13 DIAGNOSIS — D61.811 OTHER DRUG-INDUCED PANCYTOPENIA: ICD-10-CM

## 2018-12-13 DIAGNOSIS — M48.52XA COLLAPSED VERTEBRA, NOT ELSEWHERE CLASSIFIED, CERVICAL REGION, INITIAL ENCOUNTER FOR FRACTURE: ICD-10-CM

## 2018-12-13 DIAGNOSIS — F25.9 SCHIZOAFFECTIVE DISORDER, UNSPECIFIED: ICD-10-CM

## 2018-12-13 DIAGNOSIS — H40.9 UNSPECIFIED GLAUCOMA: ICD-10-CM

## 2018-12-13 DIAGNOSIS — G40.89 OTHER SEIZURES: ICD-10-CM

## 2018-12-13 DIAGNOSIS — Z88.8 ALLERGY STATUS TO OTHER DRUGS, MEDICAMENTS AND BIOLOGICAL SUBSTANCES: ICD-10-CM

## 2018-12-13 DIAGNOSIS — J44.9 CHRONIC OBSTRUCTIVE PULMONARY DISEASE, UNSPECIFIED: ICD-10-CM

## 2018-12-13 DIAGNOSIS — E03.9 HYPOTHYROIDISM, UNSPECIFIED: ICD-10-CM

## 2018-12-13 DIAGNOSIS — E83.42 HYPOMAGNESEMIA: ICD-10-CM

## 2018-12-13 DIAGNOSIS — E78.5 HYPERLIPIDEMIA, UNSPECIFIED: ICD-10-CM

## 2018-12-13 DIAGNOSIS — Z86.73 PERSONAL HISTORY OF TRANSIENT ISCHEMIC ATTACK (TIA), AND CEREBRAL INFARCTION WITHOUT RESIDUAL DEFICITS: ICD-10-CM
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
## 2018-12-13 DIAGNOSIS — T43.595A ADVERSE EFFECT OF OTHER ANTIPSYCHOTICS AND NEUROLEPTICS, INITIAL ENCOUNTER: ICD-10-CM

## 2019-01-08 PROBLEM — F31.9 BIPOLAR DISORDER, UNSPECIFIED: Chronic | Status: ACTIVE | Noted: 2018-12-05

## 2019-01-15 ENCOUNTER — APPOINTMENT (OUTPATIENT)
Dept: SURGERY | Facility: CLINIC | Age: 74
End: 2019-01-15
Payer: MEDICARE

## 2019-01-15 VITALS
SYSTOLIC BLOOD PRESSURE: 112 MMHG | WEIGHT: 109 LBS | DIASTOLIC BLOOD PRESSURE: 64 MMHG | BODY MASS INDEX: 20.06 KG/M2 | HEIGHT: 62 IN

## 2019-01-15 DIAGNOSIS — I10 ESSENTIAL (PRIMARY) HYPERTENSION: ICD-10-CM

## 2019-01-15 DIAGNOSIS — Z80.0 FAMILY HISTORY OF MALIGNANT NEOPLASM OF DIGESTIVE ORGANS: ICD-10-CM

## 2019-01-15 DIAGNOSIS — F17.200 NICOTINE DEPENDENCE, UNSPECIFIED, UNCOMPLICATED: ICD-10-CM

## 2019-01-15 DIAGNOSIS — S22.009A UNSPECIFIED FRACTURE OF UNSPECIFIED THORACIC VERTEBRA, INITIAL ENCOUNTER FOR CLOSED FRACTURE: ICD-10-CM

## 2019-01-15 DIAGNOSIS — Z80.3 FAMILY HISTORY OF MALIGNANT NEOPLASM OF BREAST: ICD-10-CM

## 2019-01-15 PROCEDURE — 99212 OFFICE O/P EST SF 10 MIN: CPT

## 2019-01-15 NOTE — PHYSICAL EXAM
[JVD] : no jugular venous distention  [Normal Breath Sounds] : Normal breath sounds [Abdominal Masses] : No abdominal masses [No HSM] : no hepatosplenomegaly [No Rash or Lesion] : No rash or lesion [Alert] : alert [Oriented to Person] : oriented to person [Oriented to Place] : oriented to place [Oriented to Time] : oriented to time [Calm] : calm [de-identified] : doing well , walking with walker, back to baseline  [de-identified] : ERNESTO [de-identified] : TLS OB with minimal pain

## 2019-01-15 NOTE — HISTORY OF PRESENT ILLNESS
[FreeTextEntry1] : had fall in December with T5 compression fractures , old/vs new was seen by NS and given TL SB and sent to rehab returns today for f/u

## 2020-10-22 NOTE — ED ADULT NURSE NOTE - NS ED NURSE DC INFO COMPLEXITY
Pt refusing to lay on back for EKG until he is cleaned. Cleaned pt and changed linens. Delay in getting EKG. Verbalized Understanding/Simple: Patient demonstrates quick and easy understanding

## 2021-03-13 NOTE — BEHAVIORAL HEALTH ASSESSMENT NOTE - RISK ASSESSMENT
Patient is at elevated risk given prior history of mental illness, inpatient admissions and chronic medical issues mitigated by lack of current mood episode/suicidality, family support, positive outpatient therapeutic alliance and residence at [facility].
yes

## 2021-08-06 NOTE — ED ADULT NURSE NOTE - PRO INTERPRETER NEED 2
English Purse String (Simple) Text: Given the location of the defect and the characteristics of the surrounding skin a pursestring closure was deemed most appropriate.  Undermining was performed circumfirentially around the surgical defect.  A purstring suture was then placed and tightened.

## 2021-10-08 NOTE — ED ADULT TRIAGE NOTE - STATUS:
Intact Cyclophosphamide Counseling:  I discussed with the patient the risks of cyclophosphamide including but not limited to hair loss, hormonal abnormalities, decreased fertility, abdominal pain, diarrhea, nausea and vomiting, bone marrow suppression and infection. The patient understands that monitoring is required while taking this medication.

## 2021-10-27 ENCOUNTER — APPOINTMENT (OUTPATIENT)
Dept: OTOLARYNGOLOGY | Facility: CLINIC | Age: 76
End: 2021-10-27
Payer: MEDICARE

## 2021-10-27 DIAGNOSIS — H91.93 UNSPECIFIED HEARING LOSS, BILATERAL: ICD-10-CM

## 2021-10-27 DIAGNOSIS — H61.23 IMPACTED CERUMEN, BILATERAL: ICD-10-CM

## 2021-10-27 PROCEDURE — 69210 REMOVE IMPACTED EAR WAX UNI: CPT

## 2021-10-27 PROCEDURE — 99203 OFFICE O/P NEW LOW 30 MIN: CPT | Mod: 25

## 2021-10-27 RX ORDER — ASPIRIN 81 MG
6.5 TABLET, DELAYED RELEASE (ENTERIC COATED) ORAL
Qty: 3 | Refills: 5 | Status: ACTIVE | OUTPATIENT
Start: 2021-10-27

## 2021-10-27 NOTE — HISTORY OF PRESENT ILLNESS
[de-identified] : Patient presents today c/o  hearing evaluation  . Patient presents today from [facility] , she is accompanied by  aide . Patient does have trach  , as per aid 3 years .

## 2021-10-27 NOTE — PHYSICAL EXAM
[Normal] : mucosa is normal [Midline] : trachea located in midline position [de-identified] : bilateral cerumen impaction

## 2021-11-02 NOTE — BEHAVIORAL HEALTH ASSESSMENT NOTE - AFFECT CONGRUENCE
----- Message from ANTONELLA Moore sent at 11/1/2021  6:33 PM CDT -----  Please inform of negative urine   Congruent

## 2021-11-10 ENCOUNTER — APPOINTMENT (OUTPATIENT)
Dept: SPEECH THERAPY | Facility: CLINIC | Age: 76
End: 2021-11-10

## 2021-12-16 ENCOUNTER — APPOINTMENT (OUTPATIENT)
Dept: SPEECH THERAPY | Facility: CLINIC | Age: 76
End: 2021-12-16

## 2022-02-10 NOTE — ED ADULT TRIAGE NOTE - MODE OF ARRIVAL
[address] Note    Date: 2/10/2022                                               Major Lolling:     Chief Complaint   Patient presents with    Annual Exam       HPI   Patient is here for annual exam.  Last Tdap 2 years ago. Last mammogram a few months ago, was told to follow-up in 6 months. Last Pap smear 2 years ago, within normal limits. Patient had negative Cologuard 1 year ago. Patient Active Problem List    Diagnosis Date Noted    GERD (gastroesophageal reflux disease) 08/06/2013    Hoarse voice quality 08/06/2013    Tobacco use 08/06/2013    Depression 08/06/2013    Alcohol abuse 08/06/2013    Anxiety 08/06/2013       Past Medical History:   Diagnosis Date    Arrhythmia     GERD (gastroesophageal reflux disease)        No current outpatient medications on file. No current facility-administered medications for this visit. No Known Allergies    Review of Systems   No CP, no SOB, no rash, no bruise, no HA, no vision change, no ankle swelling, no hearing problems, no LAD      Vitals:  /80   Pulse 81   Wt 107 lb (48.5 kg)   SpO2 97%   BMI 19.57 kg/m²     Wt Readings from Last 3 Encounters:   02/10/22 107 lb (48.5 kg)   11/22/21 108 lb (49 kg)   05/08/21 108 lb (49 kg)        Physical Exam   General:  Well-appearing, NAD, alert, non-toxic  HEENT:  Normocephalic, atraumatic. Pupils equal and round. TMs pearly with good landmarks. Moist mucous membranes. Normal dentition  NECK:  Supple, normal range of motion, no LAD, no meningeal signs, no JVD, nontender  CHEST/LUNGS: CTAB, no crackles, no wheeze, no rhonchi. Symmetric rise  CARDIOVASCULAR: RRR,  no murmur, no rub  ABDOMEN: Soft, non-tender, non-distended. No masses  EXTREMETIES: Normal movement of all extremities. No edema. No joint swelling.   SKIN:  No rash, no cellulitis, no bruising, no petechiae/purpura/vesicles/pustules/abscess  PSYCH:  A+O x 3; normal affect  NEURO:  GCS 15, CN2-12 grossly intact, no focal motor/sensory deficits, no cerebellar deficits, normal gait, normal speech      Assessment/Plan     25-year-old female here for annual exam. Mammogram is up-to-date, recommend scheduling follow-up in the next few months. Pap smear is up-to-date. Colon cancer screening is up-to-date. Routine labs up-to-date. Vital signs are stable. Tdap is up-to-date. Update flu shot today. Patient is a cigarette smoker. We will initiate lung cancer screening with low-dose CT. Follow-up in 1 year or as needed. Discharged in stable condition at 10:05 PM.    1. Need for vaccination  -     INFLUENZA, MDCK QUADV, 2 YRS AND OLDER, IM, PF, PREFILL SYR OR SDV, 0.5ML (FLUCELVAX QUADV, PF)  2. Tobacco use  -     CT Lung Screen (Initial or Annual); Future  3. Annual physical exam       Orders Placed This Encounter   Procedures    CT Lung Screen (Initial or Annual)     Age: 53 y.o. Smoking History:   Social History    Tobacco Use      Smoking status: Current Every Day Smoker        Packs/day: 1.00        Years: 37.00        Pack years: 37        Types: Cigarettes      Smokeless tobacco: Never Used    Alcohol use: Yes      Alcohol/week: 35.0 standard drinks      Types: 42 Standard drinks or equivalent per week    Drug use: No    Pack years: 37  No previous lung cancer screening exam     Standing Status:   Future     Standing Expiration Date:   8/10/2023     Order Specific Question:   Is there documentation of shared decision making? Answer:   Yes     Order Specific Question:   Does the patient show any signs or symptoms of lung cancer? Answer:   No     Order Specific Question:   Is this the first (baseline) CT or an annual exam?     Answer:   Baseline [1]     Order Specific Question:   Is this a low dose CT or a routine CT? Answer:   Low Dose CT [1]     Order Specific Question:   Smoking Status? Answer:   Current Every Day Smoker [1]     Order Specific Question:   Smoking packs per day?      Answer:   1     Order Specific Question:   Years smoking? Answer:   37    INFLUENZA, MDCK QUADV, 2 YRS AND OLDER, IM, PF, PREFILL SYR OR SDV, 0.5ML (FLUCELVAX QUADV, PF)       No follow-ups on file.     Estrella Martinez MD, MD    2/10/2022  9:58 AM Public [employer]

## 2022-03-02 NOTE — CONSULT NOTE ADULT - SUBJECTIVE AND OBJECTIVE BOX
----- Message from Nancy Merino sent at 3/2/2022  9:54 AM CST -----  Contact: Andrey Clarke [phone]  Type: Requesting to speak with nurse    Who Called: Pt's son   Regarding: requesting a refill on omeprazole (PRILOSEC) 40 MG capsule   Would the patient rather a call back or a response via [employer]? Call back  Best Call Back Number: [phone]  Additional Information: Rachel Haynes and INNA Smith, Pt is out of medication          HPI:  74 yo F with PMH of schizoaffective d/o, bipolar d/o, hx of pseudoseizures CVA, Vertigo, Glaucoma, DLD presenting with above chief complaint. She reports for the last three weeks she has been falling multiple times mostly when she stands up from the chair or bed or when she picks up objects from the floor. She reports not losing consciousness but before falling she feels dizzy and light headed. Currently whenever she stand up she feels dizzy also. She does not remember how she fell yesterday and she does not remember if she had head trauma. She was sent to the ED for evaluation. PAN CT scan showed a T5  compression deformity. She was admitted for further evaluation. (05 Dec 2018 10:59)      PAST MEDICAL & SURGICAL HISTORY:  Bipolar disorder  Glaucoma, unspecified glaucoma type, unspecified laterality  Anemia  Schizoaffective disorder, depressive type  Vertigo  Pseudoseizure  COPD (chronic obstructive pulmonary disease)  HLD (hyperlipidemia)  CVA (cerebral vascular accident)  H/O:   History of ankle surgery  History of inguinal herniorrhaphy      Hospital Course:    TODAY'S SUBJECTIVE & REVIEW OF SYMPTOMS:     Constitutional WNL   Cardio WNL   Resp WNL   GI WNL  Heme WNL  Endo WNL  Skin WNL  MSK pain  Neuro WNL  Cognitive WNL  Psych WNL      MEDICATIONS  (STANDING):  atorvastatin 80 milliGRAM(s) Oral at bedtime  chlorhexidine 4% Liquid 1 Application(s) Topical <User Schedule>  clopidogrel Tablet 75 milliGRAM(s) Oral daily  diVALproex  milliGRAM(s) Oral three times a day  docusate sodium 100 milliGRAM(s) Oral two times a day  enoxaparin Injectable 40 milliGRAM(s) SubCutaneous every 24 hours  famotidine    Tablet 20 milliGRAM(s) Oral two times a day  gabapentin 600 milliGRAM(s) Oral three times a day  ipratropium 17 MICROgram(s) HFA Inhaler 1 Puff(s) Inhalation <User Schedule>  latanoprost 0.005% Ophthalmic Solution 1 Drop(s) Both EYES at bedtime  levothyroxine 50 MICROGram(s) Oral daily  mirtazapine 15 milliGRAM(s) Oral at bedtime  nicotine -  14 mG/24Hr(s) Patch 1 patch Transdermal daily  oxybutynin 5 milliGRAM(s) Oral two times a day  risperiDONE   Tablet 2 milliGRAM(s) Oral at bedtime    MEDICATIONS  (PRN):  acetaminophen   Tablet .. 650 milliGRAM(s) Oral every 6 hours PRN Moderate Pain (4 - 6)  lactulose Syrup 20 Gram(s) Oral daily PRN For constipation  meclizine 25 milliGRAM(s) Oral three times a day PRN Dizziness  oxyCODONE    IR 5 milliGRAM(s) Oral every 6 hours PRN Severe Pain (7 - 10)      FAMILY HISTORY:  Family history of cerebrovascular accident (CVA) (Sibling)      Allergies    Dilantin (Unknown)  Keppra (Unknown)  penicillin (Unknown)  phenobarbital (Unknown)  Tegretol (Unknown)    Intolerances    Topamax (Drowsiness)      SOCIAL HISTORY:    [  ] Etoh  [  ] Smoking  [  ] Substance abuse     Home Environment:  [  ] Home Alone  [  ] Lives with Family  [  ] Home Health Aid    Dwelling:  [  ] Apartment  [  ] Private House  [  ] Adult Home  [ x ] Skilled Nursing Facility      [  ] Short Term  [  ] Long Term  [  ] Stairs       Elevator [  ]    FUNCTIONAL STATUS PTA: (Check all that apply)  Ambulation: [ x  ]Independent    [  ] Dependent     [  ] Non-Ambulatory  Assistive Device: [  ] SA Cane  [  ]  Q Cane  [x  ] Walker  [  ]  Wheelchair  ADL : [x  ] Independent  [  ]  Dependent       Vital Signs Last 24 Hrs  T(C): 36 (05 Dec 2018 09:03), Max: 36.6 (05 Dec 2018 00:04)  T(F): 96.8 (05 Dec 2018 09:03), Max: 97.9 (05 Dec 2018 00:04)  HR: 101 (05 Dec 2018 09:03) (68 - 101)  BP: 158/95 (05 Dec 2018 09:03) (122/57 - 158/95)  BP(mean): --  RR: 18 (05 Dec 2018 09:03) (16 - 18)  SpO2: 96% (05 Dec 2018 00:04) (95% - 96%)      PHYSICAL EXAM: Alert & awake  GENERAL: NAD, well-groomed, well-developed  HEAD:  Atraumatic, Normocephalic  CHEST/LUNG: Clear   HEART: S1S2+  ABDOMEN: Soft, Nontender  EXTREMITIES:  no calf tenderness    NERVOUS SYSTEM:  Cranial Nerves 2-12 intact [  ] Abnormal  [  ]  ROM: WFL all extremities [x  ]  Abnormal [  ]  Motor Strength: WFL all extremities  [ x ]  Abnormal [  ]  Sensation: intact to light touch [x  ] Abnormal [  ]  Reflexes: Symmetric [  ]  Abnormal [  ]    FUNCTIONAL STATUS:  Bed Mobility: Independent [  ]  Supervision [  ]  Needs Assistance [x  ]  N/A [  ]  Transfers: Independent [  ]  Supervision [  ]  Needs Assistance [x  ]  N/A [  ]   Ambulation: Independent [  ]  Supervision [  ]  Needs Assistance [  ]  N/A [  ]  ADL: Independent [  ] Requires Assistance [  ] N/A [  ]      LABS:                        11.4   3.34  )-----------( 70       ( 04 Dec 2018 19:55 )             34.5     12    146  |  107  |  19  ----------------------------<  89  4.0   |  27  |  0.8    Ca    9.3      04 Dec 2018 19:55    TPro  6.5  /  Alb  3.8  /  TBili  0.3  /  DBili  x   /  AST  17  /  ALT  6   /  AlkPhos  72  12-    PT/INR - ( 04 Dec 2018 19:55 )   PT: 12.10 sec;   INR: 1.12 ratio         PTT - ( 04 Dec 2018 19:55 )  PTT:26.5 sec      RADIOLOGY & ADDITIONAL STUDIES:    Assesment:

## 2022-09-04 ENCOUNTER — EMERGENCY (EMERGENCY)
Facility: HOSPITAL | Age: 77
LOS: 0 days | Discharge: NURSING, REHAB OR LONG-TERM CARE | End: 2022-09-05
Attending: EMERGENCY MEDICINE | Admitting: EMERGENCY MEDICINE

## 2022-09-04 VITALS
DIASTOLIC BLOOD PRESSURE: 63 MMHG | SYSTOLIC BLOOD PRESSURE: 145 MMHG | RESPIRATION RATE: 22 BRPM | OXYGEN SATURATION: 100 % | HEART RATE: 79 BPM | TEMPERATURE: 98 F | HEIGHT: 62 IN | WEIGHT: 169.98 LBS

## 2022-09-04 VITALS — HEART RATE: 80 BPM | OXYGEN SATURATION: 100 %

## 2022-09-04 DIAGNOSIS — Z79.02 LONG TERM (CURRENT) USE OF ANTITHROMBOTICS/ANTIPLATELETS: ICD-10-CM

## 2022-09-04 DIAGNOSIS — Z88.8 ALLERGY STATUS TO OTHER DRUGS, MEDICAMENTS AND BIOLOGICAL SUBSTANCES STATUS: ICD-10-CM

## 2022-09-04 DIAGNOSIS — Z88.0 ALLERGY STATUS TO PENICILLIN: ICD-10-CM

## 2022-09-04 DIAGNOSIS — Z98.891 HISTORY OF UTERINE SCAR FROM PREVIOUS SURGERY: Chronic | ICD-10-CM

## 2022-09-04 DIAGNOSIS — F31.9 BIPOLAR DISORDER, UNSPECIFIED: ICD-10-CM

## 2022-09-04 DIAGNOSIS — K94.23 GASTROSTOMY MALFUNCTION: ICD-10-CM

## 2022-09-04 DIAGNOSIS — Z86.2 PERSONAL HISTORY OF DISEASES OF THE BLOOD AND BLOOD-FORMING ORGANS AND CERTAIN DISORDERS INVOLVING THE IMMUNE MECHANISM: ICD-10-CM

## 2022-09-04 DIAGNOSIS — Z43.1 ENCOUNTER FOR ATTENTION TO GASTROSTOMY: ICD-10-CM

## 2022-09-04 DIAGNOSIS — Z86.69 PERSONAL HISTORY OF OTHER DISEASES OF THE NERVOUS SYSTEM AND SENSE ORGANS: ICD-10-CM

## 2022-09-04 DIAGNOSIS — Z98.890 OTHER SPECIFIED POSTPROCEDURAL STATES: ICD-10-CM

## 2022-09-04 DIAGNOSIS — E03.9 HYPOTHYROIDISM, UNSPECIFIED: ICD-10-CM

## 2022-09-04 DIAGNOSIS — Z87.891 PERSONAL HISTORY OF NICOTINE DEPENDENCE: ICD-10-CM

## 2022-09-04 DIAGNOSIS — Z87.19 PERSONAL HISTORY OF OTHER DISEASES OF THE DIGESTIVE SYSTEM: ICD-10-CM

## 2022-09-04 DIAGNOSIS — J44.9 CHRONIC OBSTRUCTIVE PULMONARY DISEASE, UNSPECIFIED: ICD-10-CM

## 2022-09-04 DIAGNOSIS — E78.5 HYPERLIPIDEMIA, UNSPECIFIED: ICD-10-CM

## 2022-09-04 DIAGNOSIS — Z98.890 OTHER SPECIFIED POSTPROCEDURAL STATES: Chronic | ICD-10-CM

## 2022-09-04 DIAGNOSIS — F25.9 SCHIZOAFFECTIVE DISORDER, UNSPECIFIED: ICD-10-CM

## 2022-09-04 PROCEDURE — 99283 EMERGENCY DEPT VISIT LOW MDM: CPT | Mod: FS

## 2022-09-04 NOTE — ED PROVIDER NOTE - OBJECTIVE STATEMENT
77 yold female to Ed PMhx pseudoseizure, schizoaffective disorder, bipolar disorder, hypothyroid, Copd 77 yold female to Ed PMhx pseudoseizure, schizoaffective disorder, bipolar disorder, hypothyroid, Copd; pt sent from nursing home for evaluation gtube fxn after she pulled on G-tube; pt alert denies pulling on tube; tube still in place; pt denies abdominal pain, fever, chills;

## 2022-09-04 NOTE — ED ADULT TRIAGE NOTE - CHIEF COMPLAINT QUOTE
ROBERTA with complaints of RN accidentally pulled peg tube tonight with trache collar Sent from nursing home

## 2022-09-04 NOTE — ED PROVIDER NOTE - PATIENT PORTAL LINK FT
You can access the FollowMyHealth Patient Portal offered by [facility] by registering at the following website: [email or web address] By joining [email or web address] FollowMyHealth portal, you will also be able to view your health information using other applications (apps) compatible with our system.

## 2022-09-04 NOTE — ED PROVIDER NOTE - CARE PROVIDER_API CALL
LEEANNE SMITH  Internal Medicine  JANAE FALLON, Phys,    Phone: [phone]  Fax: [phone]  Established Patient  Follow Up Time: 4-6 Days

## 2022-09-04 NOTE — ED PROVIDER NOTE - NSICDXPASTSURGICALHX_GEN_ALL_CORE_FT
PAST SURGICAL HISTORY:  H/O:      History of ankle surgery     History of inguinal herniorrhaphy

## 2022-09-04 NOTE — ED ADULT NURSE NOTE - IN THE PAST 12 MONTHS HAVE YOU USED DRUGS OTHER THAN THOSE REQUIRED FOR MEDICAL REASON?
Acute Care - Speech Language Pathology   Swallow Re-Evaluation [facility]   Clinical Swallow Evaluation       Patient Name: Perla Ivan  : 1930  MRN: [id number]  Today's Date: 7/3/2018        Referring Physician: MD Tobias       Admit Date: 2018    Visit Dx:     ICD-10-CM ICD-9-CM   1. Chronic congestive heart failure, unspecified congestive heart failure type (CMS/HCC) I50.9 428.0   2. Declining functional status R53.81 799.3   3. Generalized weakness R53.1 780.79   4. Hypoxemia requiring supplemental oxygen R09.02 799.02    Z99.81    5. History of atrial fibrillation Z86.79 V12.59   6. Impaired functional mobility, balance, gait, and endurance Z74.09 V49.89     Patient Active Problem List   Diagnosis   • Acute on chronic congestive heart failure (CMS/HCC)   • Weakness   • Fall   • Atrial fibrillation, chronic (CMS/HCC)   • Dementia   • Essential hypertension   • Hyperlipidemia   • Hypothyroid   • Pneumonia      Past Medical History:   Diagnosis Date   • Atrial fibrillation (CMS/HCC)    • Atrial fibrillation (CMS/HCC)    • Dementia    • Disease of thyroid gland    • Hyperlipidemia    • Hypertension    • Vertigo      Past Surgical History:   Procedure Laterality Date   • BREAST SURGERY     • CYST REMOVAL      L breat and L ovary          SWALLOW EVALUATION (last 72 hours)      SLP Adult Swallow Evaluation     Row Name 18 1300 18 0945                Rehab Evaluation    Document Type (P)  re-evaluation  -LW evaluation  -AV (r) LW (t) AV (c)       Subjective Information (P)  no complaints  -LW complains of;pain  -AV (r) LW (t) AV (c)       Patient Observations (P)  cooperative;agree to therapy;lethargic  -LW poorly cooperative;lethargic  -AV (r) LW (t) AV (c)       Patient/Family Observations (P)  son present, pt was in and out of wakefullness, easily awakened and responds to commands  -LW pt largely unintelligible 2' cog, dec'd breath support. Son present, not able to receive much hx  from him, he said to wait for his sister  -AV (r) LW (t) AV (c)       Patient Effort (P)  good  -LW fair  -AV (r) LW (t) AV (c)       Comment  -- pt lethargic, reduced cog status  -AV (r) LW (t) AV (c)          General Information    Patient Profile Reviewed (P)  yes  -LW yes  -AV (r) LW (t) AV (c)       Pertinent History Of Current Problem (P)  Adm chronic congestive heart failure, possible Pna?, Hx dementia   -LW Adm chronic congestive heart failure, possible Pna?, Hx dementia   -AV (r) LW (t) AV (c)       Current Method of Nutrition (P)  regular textures;thin liquids  -LW regular textures;thin liquids  -AV (r) LW (t) AV (c)       Precautions/Limitations, Vision (P)  WFL;for purposes of eval  -LW WFL;for purposes of eval  -AV (r) LW (t) AV (c)       Precautions/Limitations, Hearing (P)  WFL  -LW WFL  -AV (r) LW (t) AV (c)       Prior Level of Function-Communication (P)  cognitive-linguistic impairment   baseline dementia  -LW cognitive-linguistic impairment   baseline dementia  -AV (r) LW (t) AV (c)       Prior Level of Function-Swallowing (P)  no diet consistency restrictions  -LW no diet consistency restrictions  -AV (r) LW (t) AV (c)       Plans/Goals Discussed with (P)  patient;family  -LW patient;family  -AV (r) LW (t) AV (c)       Barriers to Rehab (P)  medically complex;cognitive status  -LW medically complex;cognitive status  -AV (r) LW (t) AV (c)       Patient's Goals for Discharge (P)  patient did not state  -LW patient did not state  -AV (r) LW (t) AV (c)       Family Goals for Discharge (P)  family did not state  -LW  --          Pain Assessment    Additional Documentation  -- Pain Scale: FACES Pre/Post-Treatment (Group)  -AV (r) LW (t) AV (c)          Pain Scale: FACES Pre/Post-Treatment    Pain: FACES Scale, Pretreatment  -- 6-->hurts even more  -AV (r) LW (t) AV (c)       Pain: FACES Scale, Post-Treatment  -- 6-->hurts even more  -AV (r) LW (t) AV (c)       Pre/Post Treatment Pain Comment  -- feet  and legs  -AV (r) LW (t) AV (c)          Oral Motor and Function    Dentition Assessment (P)  upper dentures/partial in place  -LW upper dentures/partial in place  -AV (r) LW (t) AV (c)       Secretion Management (P)  WNL/WFL  -LW WNL/WFL  -AV (r) LW (t) AV (c)       Mucosal Quality (P)  moist, healthy  -LW moist, healthy  -AV (r) LW (t) AV (c)       Volitional Swallow (P)  weak  -LW weak  -AV (r) LW (t) AV (c)       Volitional Cough (P)  weak;reduced respiratory support  -LW weak;reduced respiratory support  -AV (r) LW (t) AV (c)          Oral Musculature and Cranial Nerve Assessment    Oral Motor General Assessment (P)  WFL  -LW unable to assess  -AV (r) LW (t) AV (c)          General Eating/Swallowing Observations    Respiratory Support Currently in Use (P)  nasal cannula  -LW nasal cannula  -AV (r) LW (t) AV (c)       Eating/Swallowing Skills (P)  fed by SLP;self-fed;unsafe self-feeding skills observed  -LW fed by SLP  -AV (r) LW (t) AV (c)       Positioning During Eating (P)  upright in bed  -LW upright in bed  -AV (r) LW (t) AV (c)       Utensils Used (P)  cup  -LW spoon;cup  -AV (r) LW (t) AV (c)       Consistencies Trialed (P)  pureed;regular textures;thin liquids  -LW pureed;thin liquids;nectar/syrup-thick liquids  -AV (r) LW (t) AV (c)          Clinical Swallow Eval    Oral Prep Phase (P)  WFL  -LW impaired  -AV (r) LW (t) AV (c)       Oral Transit (P)  WFL  -LW  --       Oral Residue (P)  WFL  -LW  --       Pharyngeal Phase (P)  no overt signs/symptoms of pharyngeal impairment  -LW suspected pharyngeal impairment   hard to assess 2' cog status  -AV (r) LW (t) AV (c)       Esophageal Phase (P)  suspected esophageal impairment  -LW  --       Clinical Swallow Evaluation Summary (P)  Pt re-evaluated by BS this pm. Pt more alert and cooperative, still in and out of wakefulness. Easily aroused and followed commands w/ multiple repetitions. Pt exhibted no overt s/s of aspiration w/ any trial. Pt exhibited to  "\"snore\" multiple times during the eval, even when awake (suspect possible velopharyngeal incompetence) and burp repetitively during mastication and throughout eval. REC: soft, chopped, thin 2' cog status and ease per son. Meds w/ puree. Aspiration precautions. Oral Care BID and PRN.   -LW Pt seen for BS this AM. Cog status made BS difficult to assess. Pt administerd thins (tsp and cup), nectar, and puree. Cough w/ tsp thins only. Multiple Swallows w/ cup thins only. No s/s w/ any other consistency. Pt made \"snorting sound\" throughout eval. Possible velopharyngeal incompetency. Will re-assess this pm in hope that cog status may improve. REC: diet as is, meds crushed w/ puree/pudding, aspiration precautions.  -AV (r) LW (t) AV (c)          Oral Prep Concerns    Oral Prep Concerns  -- anterior loss;incomplete or weak lip closure around spoon  -AV (r) LW (t) AV (c)       Incomplete or Weak Lip Closure Around Spoon  -- thin  -AV (r) LW (t) AV (c)       Anterior Loss  -- thin  -AV (r) LW (t) AV (c)          Pharyngeal Phase Concerns    Pharyngeal Phase Concerns  -- cough;multiple swallows  -AV (r) LW (t) AV (c)       Multiple Swallows  -- thin   cup  -AV (r) LW (t) AV (c)       Cough  -- thin   tsp  -AV (r) LW (t) AV (c)          Esophageal Phase Concerns    Esophageal Phase Concerns (P)  belching  -LW  --       Belching (P)  all consistencies  -LW  --          Clinical Impression    SLP Swallowing Diagnosis (P)  functional oral phase;functional pharyngeal phase   still monitor for possible pharyngeal dysfunction  -LW mild;oral dysfunction;suspected pharyngeal dysfunction  -AV (r) LW (t) AV (c)       Functional Impact (P)  risk of aspiration/pneumonia  -LW risk of aspiration/pneumonia  -AV (r) LW (t) AV (c)       Rehab Potential/Prognosis, Swallowing (P)  good, to achieve stated therapy goals  -LW good, to achieve stated therapy goals  -AV (r) LW (t) AV (c)       Criteria for Skilled Therapeutic Interventions Met (P)  " demonstrates skilled criteria  -LW demonstrates skilled criteria  -AV (r) LW (t) AV (c)          Recommendations    Therapy Frequency (Swallow) (P)  evaluation only;PRN  -LW evaluation only  -AV (r) LW (t) AV (c)       Predicted Duration Therapy Intervention (Days) (P)  until discharge  -LW until discharge  -AV (r) LW (t) AV (c)       SLP Diet Recommendation (P)  soft textures;chopped;thin liquids  -LW regular textures;thin liquids  -AV (r) LW (t) AV (c)       Recommended Diagnostics  -- reassess via clinical swallow evaluation  -AV (r) LW (t) AV (c)       Recommended Precautions and Strategies (P)  upright posture during/after eating;small bites of food and sips of liquid  -LW upright posture during/after eating;small bites of food and sips of liquid  -AV (r) LW (t) AV (c)       SLP Rec. for Method of Medication Administration (P)  meds crushed;with pudding or applesauce  -LW meds crushed;with pudding or applesauce  -AV (r) LW (t) AV (c)       Monitor for Signs of Aspiration (P)  yes;cough;gurgly voice;throat clearing;pneumonia;notify SLP if any concerns  -LW yes;cough;gurgly voice;throat clearing;pneumonia;notify SLP if any concerns  -AV (r) LW (t) AV (c)       Anticipated Dischage Disposition (P)  unknown  -LW unknown  -AV (r) LW (t) AV (c)          Swallow Goals (SLP)    Oral Nutrition/Hydration Goal Selection (SLP) (P)  oral nutrition/hydration, SLP goal 1  -LW  --       Swallow Management Recall Goal Selection (SLP) (P)  swallow management recall, SLP goal 1  -LW  --       Swallow Compensatory Strategies Goal Selection (SLP) (P)  swallow compensatory strategies, SLP goal 1  -LW  --       Additional Documentation (P)  swallow compensatory strategies goal selection (SLP)  -LW  --          Oral Nutrition/Hydration Goal 1 (SLP)    Oral Nutrition/Hydration Goal 1, SLP (P)  LTG: Pt will tolerate diet of soft/chopped and thins w/o s/s of aspiration w/ 100% accuracy and minmal cueing.   -LW  --       Time Frame (Oral  Nutrition/Hydration Goal 1, SLP) (P)  by discharge  -LW  --       Progress/Outcomes (Oral Nutrition/Hydration Goal 1, SLP) (P)  goal ongoing  -LW  --          Swallow Compensatory Strategies Goal 1 (SLP)    Activity (Swallow Compensatory Strategies/Techniques Goal 1, SLP) (P)  small bites;small cup sips;during meal intake  -LW  --       [place]/Accuracy (Swallow Compensatory Strategies/Techniques Goal 1, SLP) (P)  with minimal cues (75-90% accuracy)  -LW  --       Time Frame (Swallow Compensatory Strategies/Techniques Goal 1, SLP) (P)  short term goal (STG);by discharge  -LW  --       Progress/Outcomes (Swallow Compensatory Strategies/Techniques Goal 1, SLP) (P)  goal ongoing  -LW  --         User Key  (r) = Recorded By, (t) = Taken By, (c) = Cosigned By    Initials Name Effective Dates    AV Tammie Luciano, MS CCC-SLP 04/03/18 -     LW Katey Dixon, Speech Therapy Student 05/14/18 -         EDUCATION  The patient has been educated in the following areas:   Dysphagia (Swallowing Impairment) Modified Diet Instruction.    SLP Recommendation and Plan  SLP Swallowing Diagnosis: (P) functional oral phase, functional pharyngeal phase (still monitor for possible pharyngeal dysfunction)  SLP Diet Recommendation: (P) soft textures, chopped, thin liquids  Recommended Precautions and Strategies: (P) upright posture during/after eating, small bites of food and sips of liquid     Monitor for Signs of Aspiration: (P) yes, cough, gurgly voice, throat clearing, pneumonia, notify SLP if any concerns  Recommended Diagnostics: reassess via clinical swallow evaluation  Criteria for Skilled Therapeutic Interventions Met: (P) demonstrates skilled criteria  Anticipated Dischage Disposition: (P) unknown  Rehab Potential/Prognosis, Swallowing: (P) good, to achieve stated therapy goals  Therapy Frequency (Swallow): (P) evaluation only, PRN  Predicted Duration Therapy Intervention (Days): (P) until discharge       Plan of Care  Reviewed With: (P) patient, son  Plan of Care Review  Plan of Care Reviewed With: (P) patient, son  Progress: (P) improving          SLP GOALS     Row Name 07/03/18 1300             Oral Nutrition/Hydration Goal 1 (SLP)    Oral Nutrition/Hydration Goal 1, SLP (P)  LTG: Pt will tolerate diet of soft/chopped and thins w/o s/s of aspiration w/ 100% accuracy and minmal cueing.   -LW      Time Frame (Oral Nutrition/Hydration Goal 1, SLP) (P)  by discharge  -LW      Progress/Outcomes (Oral Nutrition/Hydration Goal 1, SLP) (P)  goal ongoing  -LW         Swallow Compensatory Strategies Goal 1 (SLP)    Activity (Swallow Compensatory Strategies/Techniques Goal 1, SLP) (P)  small bites;small cup sips;during meal intake  -LW      [place]/Accuracy (Swallow Compensatory Strategies/Techniques Goal 1, SLP) (P)  with minimal cues (75-90% accuracy)  -LW      Time Frame (Swallow Compensatory Strategies/Techniques Goal 1, SLP) (P)  short term goal (STG);by discharge  -LW      Progress/Outcomes (Swallow Compensatory Strategies/Techniques Goal 1, SLP) (P)  goal ongoing  -LW        User Key  (r) = Recorded By, (t) = Taken By, (c) = Cosigned By    Initials Name Provider Type    SOMMER Dixon, Speech Therapy Student Speech Therapy Student               Time Calculation:         Time Calculation- SLP     Row Name 07/03/18 1342 07/03/18 1020          Time Calculation- SLP    SLP Start Time (P)  1300  -LW 0945  -AV (r) LW (t) AV (c)     SLP Received On (P)  07/03/18  -LW 07/03/18  -AV (r) LW (t) AV (c)       User Key  (r) = Recorded By, (t) = Taken By, (c) = Cosigned By    Initials Name Provider Type    PATRICIO Luciano MS CCC-SLP Speech and Language Pathologist     Katey Dixon, Speech Therapy Student Speech Therapy Student          Therapy Charges for Today     Code Description Service Date Service Provider Modifiers Qty    [id number] [facility] ST EVAL ORAL PHARYNG SWALLOW 2 7/3/2018 Katey Dixon, Speech Therapy Student GN 1     [id number] [facility] ORAL PHARYNG SWALLOW 2 7/3/2018 Katey Dixon, Speech Therapy Student GN 1               Katey Dixon, Speech Therapy Student  7/3/2018   No

## 2022-09-04 NOTE — ED PROVIDER NOTE - PHYSICAL EXAMINATION
Constitutional: Well developed, well nourished. NAD  Head: Normocephalic, atraumatic.  Eyes: PERRL, EOMI.  ENT: No nasal discharge. Mucous membranes dry.  Neck:  trach collar noted  Cardiovascular:   Regular rate and rhythm.    Pulmonary:   Lungs clear to auscultation bilaterally.   Abdominal: Soft. Nondistended. No rebound, guarding, rigidity. 16 fr gtube noted in place - flushed with 120 cc water; flushed easily; + gi content return on aspiration;  Extremities. Pelvis stable. No lower extremity edema, symmetric calves.  Skin: No rashes, cyanosis.  Neuro: AAOx2. No focal neurological deficits.  Psych: Normal mood. Normal affect.

## 2022-09-04 NOTE — ED PROVIDER NOTE - CLINICAL SUMMARY MEDICAL DECISION MAKING FREE TEXT BOX
Abdomen soft and non tender.  Gastrostomy tube is not displaced.  Flushes easily and gastric contents obtained on pull back.  D/C back to [facility].

## 2022-09-04 NOTE — ED PROVIDER NOTE - NSICDXPASTMEDICALHX_GEN_ALL_CORE_FT
PAST MEDICAL HISTORY:  Anemia     Bipolar disorder     COPD (chronic obstructive pulmonary disease)     CVA (cerebral vascular accident)     Glaucoma, unspecified glaucoma type, unspecified laterality     HLD (hyperlipidemia)     Pseudoseizure     Schizoaffective disorder, depressive type     Vertigo

## 2022-09-04 NOTE — ED PROVIDER NOTE - NS ED ATTENDING STATEMENT MOD
This was a shared visit with the ARINA. I reviewed and verified the documentation and independently performed the documented:

## 2022-09-05 ENCOUNTER — EMERGENCY (EMERGENCY)
Facility: HOSPITAL | Age: 77
LOS: 0 days | Discharge: NURSING, REHAB OR LONG-TERM CARE | End: 2022-09-05
Attending: STUDENT IN AN ORGANIZED HEALTH CARE EDUCATION/TRAINING PROGRAM | Admitting: STUDENT IN AN ORGANIZED HEALTH CARE EDUCATION/TRAINING PROGRAM

## 2022-09-05 VITALS
TEMPERATURE: 97 F | HEIGHT: 62 IN | RESPIRATION RATE: 16 BRPM | HEART RATE: 83 BPM | OXYGEN SATURATION: 99 % | SYSTOLIC BLOOD PRESSURE: 120 MMHG | DIASTOLIC BLOOD PRESSURE: 62 MMHG

## 2022-09-05 VITALS — OXYGEN SATURATION: 100 %

## 2022-09-05 DIAGNOSIS — Z87.19 PERSONAL HISTORY OF OTHER DISEASES OF THE DIGESTIVE SYSTEM: ICD-10-CM

## 2022-09-05 DIAGNOSIS — K94.23 GASTROSTOMY MALFUNCTION: ICD-10-CM

## 2022-09-05 DIAGNOSIS — J44.9 CHRONIC OBSTRUCTIVE PULMONARY DISEASE, UNSPECIFIED: ICD-10-CM

## 2022-09-05 DIAGNOSIS — Z86.73 PERSONAL HISTORY OF TRANSIENT ISCHEMIC ATTACK (TIA), AND CEREBRAL INFARCTION WITHOUT RESIDUAL DEFICITS: ICD-10-CM

## 2022-09-05 DIAGNOSIS — Z98.890 OTHER SPECIFIED POSTPROCEDURAL STATES: Chronic | ICD-10-CM

## 2022-09-05 DIAGNOSIS — E78.5 HYPERLIPIDEMIA, UNSPECIFIED: ICD-10-CM

## 2022-09-05 DIAGNOSIS — Z87.891 PERSONAL HISTORY OF NICOTINE DEPENDENCE: ICD-10-CM

## 2022-09-05 DIAGNOSIS — R42 DIZZINESS AND GIDDINESS: ICD-10-CM

## 2022-09-05 DIAGNOSIS — Z98.891 HISTORY OF UTERINE SCAR FROM PREVIOUS SURGERY: Chronic | ICD-10-CM

## 2022-09-05 DIAGNOSIS — Z88.0 ALLERGY STATUS TO PENICILLIN: ICD-10-CM

## 2022-09-05 DIAGNOSIS — Z86.2 PERSONAL HISTORY OF DISEASES OF THE BLOOD AND BLOOD-FORMING ORGANS AND CERTAIN DISORDERS INVOLVING THE IMMUNE MECHANISM: ICD-10-CM

## 2022-09-05 DIAGNOSIS — H40.9 UNSPECIFIED GLAUCOMA: ICD-10-CM

## 2022-09-05 DIAGNOSIS — Z79.02 LONG TERM (CURRENT) USE OF ANTITHROMBOTICS/ANTIPLATELETS: ICD-10-CM

## 2022-09-05 DIAGNOSIS — Z88.8 ALLERGY STATUS TO OTHER DRUGS, MEDICAMENTS AND BIOLOGICAL SUBSTANCES STATUS: ICD-10-CM

## 2022-09-05 DIAGNOSIS — F44.5 CONVERSION DISORDER WITH SEIZURES OR CONVULSIONS: ICD-10-CM

## 2022-09-05 DIAGNOSIS — F31.9 BIPOLAR DISORDER, UNSPECIFIED: ICD-10-CM

## 2022-09-05 PROCEDURE — 74018 RADEX ABDOMEN 1 VIEW: CPT | Mod: 26

## 2022-09-05 PROCEDURE — 99283 EMERGENCY DEPT VISIT LOW MDM: CPT | Mod: FS

## 2022-09-05 RX ORDER — DIATRIZOATE MEGLUMINE 180 MG/ML
30 INJECTION, SOLUTION INTRAVESICAL ONCE
Refills: 0 | Status: COMPLETED | OUTPATIENT
Start: 2022-09-05 | End: 2022-09-05

## 2022-09-05 RX ADMIN — DIATRIZOATE MEGLUMINE 30 MILLILITER(S): 180 INJECTION, SOLUTION INTRAVESICAL at 05:44

## 2022-09-05 NOTE — ED ADULT TRIAGE NOTE - ESI TRIAGE ACUITY LEVEL, MLM
To Do:  End of Shift Summary  For vital signs and complete assessments, please see documentation flowsheets.     Pertinent assessments: Incisional pain reported 2/10. Taking scheduled tylenol for pain.  Patient stood at bedside.   Denies nausea or SOB.  On Room air.  Capno in place. Marm[place]jo in place.     Major Shift Events patient stood at bedside.  Tolerated clear liquid diet well.    Treatment Plan: Cipro and flagyl    Discharge Readiness: Medically active  Expected Discharge Date: TBD  Discharge Disposition: Home with Self care  Barriers/Criteria for discharge    3

## 2022-09-05 NOTE — ED ADULT TRIAGE NOTE - CHIEF COMPLAINT QUOTE
pt returns to ED from NH after DC 2 hours ago. as per EMS, NH called them back insisting Gtube that was confirmed by xray is not in place.

## 2022-09-05 NOTE — ED ADULT NURSE NOTE - OBJECTIVE STATEMENT
Pt received from [facility] for [facility]hab for G tube problem, pt was seen and earlier in ED for G tube placement issue cleared and dc back to facility, Pt g tube dislodge on arrival, redness to abdomen wall near g tube insertion site, minimal clear gastric tube liquid leaking, MD assessing pt at bedside Pt received from [facility] for Rehab for G tube problem, pt was seen and earlier in ED for G tube placement issue cleared and dc back to facility, Pt g tube dislodge on arrival, redness to abdomen wall near g tube insertion site, minimal clear gastric tube liquid leaking, Pt trach to o2, placed to wall O2, suction set up at bedside, in no  distress, MD assessing pt at bedside

## 2022-09-05 NOTE — ED PROVIDER NOTE - PHYSICAL EXAMINATION
Constitutional: Well developed, well nourished. NAD  Head: Normocephalic, atraumatic.  Eyes: PERRL, EOMI.  ENT: No nasal discharge. Mucous membranes dry.  Neck: Supple. Painless ROM. trach coller noted  Cardiovascular:  Regular rate and rhythm.    Pulmonary:  Lungs clear to auscultation bilaterally.    Abdominal: Soft. Gtube replaced with 18 fr emily; abd non tender;   Extremities. Pelvis stable. No lower extremity edema, symmetric calves.  Skin: No rashes, cyanosis.  Neuro: AAOx3. No focal neurological deficits.  Psych: Normal mood. Normal affect.

## 2022-09-05 NOTE — ED ADULT NURSE NOTE - ISOLATION TYPE:
Detail Level: Detailed
Quality 226: Preventive Care And Screening: Tobacco Use: Screening And Cessation Intervention: Patient screened for tobacco use and is an ex/non-smoker
Quality 111:Pneumonia Vaccination Status For Older Adults: Pneumococcal vaccine administered on or after patient’s 60th birthday and before the end of the measurement period
Quality 130: Documentation Of Current Medications In The Medical Record: Current Medications Documented
None

## 2022-09-05 NOTE — ED PROVIDER NOTE - PATIENT PORTAL LINK FT
You can access the FollowMyHealth Patient Portal offered by [facility] by registering at the following website: [email or web address]. By joining [employer]’s FollowMyHealth portal, you will also be able to view your health information using other applications (apps) compatible with our system. You can access the FollowMyHealth Patient Portal offered by [facility] by registering at the following website: [email or web address]. By joining [employer]’s FollowMyHealth portal, you will also be able to view your health information using other applications (apps) compatible with our system.

## 2022-09-05 NOTE — ED ADULT NURSE NOTE - NSIMPLEMENTINTERV_GEN_ALL_ED
Implemented All Fall Risk Interventions:  [place] to call system. Call bell, personal items and telephone within reach. Instruct patient to call for assistance. Room bathroom lighting operational. Non-slip footwear when patient is off stretcher. Physically safe environment: no spills, clutter or unnecessary equipment. Stretcher in lowest position, wheels locked, appropriate side rails in place. Provide visual cue, wrist band, yellow gown, etc. Monitor gait and stability. Monitor for mental status changes and reorient to person, place, and time. Review medications for side effects contributing to fall risk. Reinforce activity limits and safety measures with patient and family.

## 2022-09-05 NOTE — ED PROVIDER NOTE - OBJECTIVE STATEMENT
77 yold female to Ed sent for gtube replacement; pt seen here earlier with gtube in place flushed successfully but on return to NH tube completely out; Old tube 16fr;  replaced with 18 fr and balloon inflated with 10cc ns; gastrograffin injected and xray done;

## 2022-09-05 NOTE — ED PROVIDER NOTE - CLINICAL SUMMARY MEDICAL DECISION MAKING FREE TEXT BOX
69-year-old female past medical history as previously noted sent to the ER for G-tube replacement.  Patient was seen in the ER earlier with a successful G-tube placement but on return nasogastric tube was completely out.  Patient tolerated procedure well will discharge    Constitutional: Well developed, well nourished. NAD  Head: Normocephalic, atraumatic.  Eyes: PERRL, EOMI.  ENT: No nasal discharge. Mucous membranes dry.  Neck: Supple. Painless ROM. trach coller noted  Cardiovascular:  Regular rate and rhythm.    Pulmonary:  Lungs clear to auscultation bilaterally.    Abdominal: Soft. Gtube replaced with 18 fr emily; abd non tender;   Extremitis. Pelvis stable. No lower extremity edema, symmetric calves.  Skin: No rashes, cyanosis.  Neuro: AAOx3. No focal neurological deficits.  Psych: Normal mood. Normal affect.

## 2022-09-05 NOTE — ED ADULT NURSE REASSESSMENT NOTE - NS ED NURSE REASSESS COMMENT FT1
Pt G tube, replaced by Provider with 18 fr and balloon inflated with 10ml NS, ; gastrografin injected and xray studies completed at bedside done; Pt trach to O2 kostas even unlabored o2 sat 100%, in no respiratory distress, PEG site cleanse and dry dressing applied at G tube insertion site. Pt incontinent of stool, maren care rendered, bed linen changed, Dispo pending

## 2022-10-20 NOTE — ED ADULT TRIAGE NOTE - ACCOMPANIED BY
[facility] Call Center    Phone Message    May a detailed message be left on voicemail: yes     Reason for Call: Other: Patient called to make an appt for Top Surgery (F to M) consult appt.  Patient starts a new job next week and will have insurance in 2 weeks, per patient.  Please follow up with patient at [phone].  Thank you!     Action Taken: Message routed to:  [facility]    Travel Screening: Not Applicable                                                                        
Self
The patient is a 72y Male complaining of chest pain.

## 2022-11-28 NOTE — ED ADULT TRIAGE NOTE - GLASGOW COMA SCALE: BEST VERBAL RESPONSE, MLM
Subjective   Patient ID: Yair is a 4 year old male.    Chief Complaint   Patient presents with   • Conjunctivitis     Pt has had red, irritated eyes since Saturday; mom bought OTC pink eye drops from [employer] and has been using them but no relief and pt states they are hurting him; denies any recent illness     Mother states that child has been having mucus to the eyes and irritated red eye since Saturday.  She tried over-the-counter medication for the eyedrops for the eyes but it was not working.  Patient states that he has been having pain and irritation to the eyes.  Mother has seen mucus and crusting to both eyelashes of both eyes.  No other symptoms.        Past Medical History:   Diagnosis Date   • No known problems        MEDICATIONS:  Current Outpatient Medications   Medication Sig   • tobramycin (TOBREX) 0.3 % ophthalmic solution Place 1 drop into both eyes every 4 hours for 7 days.   • Ibuprofen (MOTRIN CHILDRENS PO)      No current facility-administered medications for this visit.       ALLERGIES:  ALLERGIES:  No Known Allergies    PAST SURGICAL HISTORY:  Past Surgical History:   Procedure Laterality Date   • Cyst removal  2021    on head       FAMILY HISTORY:  Family History   Problem Relation Age of Onset   • Patient is unaware of any medical problems Mother    • Patient is unaware of any medical problems Father    • Hypertension Maternal Grandmother        SOCIAL HISTORY:  Social History     Tobacco Use   • Smoking status: Passive Smoke Exposure - Never Smoker         Patient's medications, allergies, past medical, surgical, and social history  were reviewed and updated as appropriate.    Review of Systems   Eyes: Positive for discharge.   All other systems reviewed and are negative.      Objective   Physical Exam  Vitals and nursing note reviewed.   Constitutional:       General: He is active.      Appearance: Normal appearance. He is well-developed and normal weight.   HENT:      Head:  Normocephalic and atraumatic.      Right Ear: Tympanic membrane, ear canal and external ear normal.      Left Ear: Tympanic membrane, ear canal and external ear normal.      Nose: Nose normal.      Mouth/Throat:      Mouth: Mucous membranes are moist.      Pharynx: Oropharynx is clear.      Neck: Normal range of motion and neck supple.   Eyes:      General:         Right eye: Discharge present.         Left eye: Discharge present.     Extraocular Movements: Extraocular movements intact.      Conjunctiva/sclera: Conjunctivae normal.      Pupils: Pupils are equal, round, and reactive to light.   Cardiovascular:      Rate and Rhythm: Normal rate and regular rhythm.   Pulmonary:      Effort: Pulmonary effort is normal.      Breath sounds: Normal breath sounds.   Abdominal:      General: Abdomen is flat. Bowel sounds are normal.      Palpations: Abdomen is soft.   Musculoskeletal:         General: Normal range of motion.   Skin:     General: Skin is warm and dry.      Capillary Refill: Capillary refill takes less than 2 seconds.   Neurological:      General: No focal deficit present.      Mental Status: He is alert and oriented for age.   Psychiatric:         Mood and Affect: Mood normal.         Behavior: Behavior normal.         Thought Content: Thought content normal.         Judgment: Judgment normal.           Medical decision making:  Multiple differential diagnoses were considered. The patient was apprised of diagnostic and treatment options including alternate modes of care, in addition to risks and benefits, for this medical condition. Based on this discussion the patient agrees with this chosen diagnostic and treatment plan.         Vitals:    11/28/22 0903   BP: 108/66   BP Location: LUE - Left upper extremity   Patient Position: Sitting   Cuff Size: Pediatric   Pulse: 103   Resp: 28   Temp: 97.3 °F (36.3 °C)   TempSrc: Oral   SpO2: 100%   Weight: 19 kg (41 lb 14.2 oz)        Assessment     Problem List Items  Addressed This Visit    None     Visit Diagnoses     Conjunctivitis of both eyes, unspecified conjunctivitis type    -  Primary    Relevant Medications    tobramycin (TOBREX) 0.3 % ophthalmic solution           Diagnoses that have been ruled out:   None   Diagnoses that are still under consideration:   None   Final diagnoses:   1. Conjunctivitis of both eyes, unspecified conjunctivitis type        Treatment:    Plan:  Use warm moist washcloths to wipe away the mucus from the eyes  You can also use baby shampoo to wash away the eyes  Children's Tylenol every 4 hours as needed for pain  Use the eyedrops to both eyes as directed  He needs to be off school until Wednesday and may return to school on Wednesday  Good handwashing        Instructions provided as documented in the AVS.    Thank you for visiting [facility]   (V5) oriented

## 2023-04-12 NOTE — ED ADULT NURSE NOTE - NSFALLRSKASSISTTYPE_ED_ALL_ED
Focus note    FHT is showing recurrent late  decelerations with moderate variability.     SVE 5/80/-1    Discussed risks, benefits, and alternatives to FSE and IUPC placement. Risks include fetal, vaginal, cervical, placental, and uterine injury. Benefits include accurate monitoring of fetal heart tones, ability to assess adequacy of contractions, and ability to perform amnio-infusion if indicated. Alternatives include continued attempts of external monitoring.    After informed consent was obtained, FSE and IUPC were placed.     Amnio infusion per protocol started.      updated     Casie Monroe MD   PGY 1, Obstetrics & Gynecology  4/12/2023     I saw Ms Samuel and reviewed her strip.  I agree with the above resident note,  Assessment and plan with the notation that some of the decels are variables.  Melisa Stephens MD    Standing/Walking/Toileting

## 2023-07-10 NOTE — ED PROVIDER NOTE - FAMILY HISTORY
I spoke with patient let her know we received refill request. Patient mentioned just had medication refilled does not need it at this time. I also reminded patient she is due for ov at the end of Dec just wanted to give her a courtesy reminder since doctor is booking out. Patient mentioned was in the ER on 06/12/23 for A-fib does have appt with Dr Robles on 07/12/2023, will see if she may need to follow up with Dr Mckay sooner depending on what the cardiologist says otherwise we will see her in Dec.   No pertinent family history in first degree relatives

## 2023-08-14 NOTE — ED ADULT NURSE NOTE - NSSEPSISSUSPECTED_ED_A_ED
Masking is now optional at our facility. Masks will be provided at entrances for those who wish to use one. If you would like teammates to wear a mask just please request this. Our rooms are relatively small and if more than two visitors are coming we may ask that some individuals wait in the waiting room to minimize overcrowding.      No

## 2023-09-17 ENCOUNTER — INPATIENT (INPATIENT)
Facility: HOSPITAL | Age: 78
LOS: 8 days | Discharge: NURSING, REHAB OR LONG-TERM CARE | DRG: 870 | End: 2023-09-26
Attending: INTERNAL MEDICINE | Admitting: INTERNAL MEDICINE
Payer: MEDICARE

## 2023-09-17 VITALS — OXYGEN SATURATION: 100 % | RESPIRATION RATE: 40 BRPM | HEART RATE: 129 BPM

## 2023-09-17 DIAGNOSIS — Z98.890 OTHER SPECIFIED POSTPROCEDURAL STATES: Chronic | ICD-10-CM

## 2023-09-17 DIAGNOSIS — Z98.891 HISTORY OF UTERINE SCAR FROM PREVIOUS SURGERY: Chronic | ICD-10-CM

## 2023-09-17 DIAGNOSIS — A41.9 SEPSIS, UNSPECIFIED ORGANISM: ICD-10-CM

## 2023-09-17 LAB
ALBUMIN SERPL ELPH-MCNC: 2.1 G/DL — LOW (ref 3.5–5.2)
ALP SERPL-CCNC: 156 U/L — HIGH (ref 30–115)
ALT FLD-CCNC: 20 U/L — SIGNIFICANT CHANGE UP (ref 0–41)
ANION GAP SERPL CALC-SCNC: 10 MMOL/L — SIGNIFICANT CHANGE UP (ref 7–14)
APPEARANCE UR: ABNORMAL
APTT BLD: 24.3 SEC — LOW (ref 27–39.2)
APTT BLD: 28.5 SEC — SIGNIFICANT CHANGE UP (ref 27–39.2)
AST SERPL-CCNC: 38 U/L — SIGNIFICANT CHANGE UP (ref 0–41)
BACTERIA # UR AUTO: ABNORMAL /HPF
BASE EXCESS BLDV CALC-SCNC: 4.1 MMOL/L — HIGH (ref -2–3)
BASOPHILS # BLD AUTO: 0.02 K/UL — SIGNIFICANT CHANGE UP (ref 0–0.2)
BASOPHILS NFR BLD AUTO: 0.1 % — SIGNIFICANT CHANGE UP (ref 0–1)
BILIRUB SERPL-MCNC: 0.3 MG/DL — SIGNIFICANT CHANGE UP (ref 0.2–1.2)
BILIRUB UR-MCNC: NEGATIVE — SIGNIFICANT CHANGE UP
BLD GP AB SCN SERPL QL: SIGNIFICANT CHANGE UP
BUN SERPL-MCNC: 16 MG/DL — SIGNIFICANT CHANGE UP (ref 10–20)
CA-I SERPL-SCNC: 1.23 MMOL/L — SIGNIFICANT CHANGE UP (ref 1.15–1.33)
CALCIUM SERPL-MCNC: 7.9 MG/DL — LOW (ref 8.4–10.5)
CAST: 7 /LPF — HIGH (ref 0–4)
CHLORIDE SERPL-SCNC: 100 MMOL/L — SIGNIFICANT CHANGE UP (ref 98–110)
CO2 SERPL-SCNC: 27 MMOL/L — SIGNIFICANT CHANGE UP (ref 17–32)
COD CRY URNS QL: PRESENT
COLOR SPEC: YELLOW — SIGNIFICANT CHANGE UP
CREAT SERPL-MCNC: 0.6 MG/DL — LOW (ref 0.7–1.5)
CRP SERPL-MCNC: 89.2 MG/L — HIGH
D DIMER BLD IA.RAPID-MCNC: 2844 NG/ML DDU — HIGH
DIFF PNL FLD: ABNORMAL
EGFR: 91 ML/MIN/1.73M2 — SIGNIFICANT CHANGE UP
EOSINOPHIL # BLD AUTO: 0.03 K/UL — SIGNIFICANT CHANGE UP (ref 0–0.7)
EOSINOPHIL NFR BLD AUTO: 0.2 % — SIGNIFICANT CHANGE UP (ref 0–8)
FIBRINOGEN PPP-MCNC: 626 MG/DL — HIGH (ref 204.4–570.6)
GAS PNL BLDV: 135 MMOL/L — LOW (ref 136–145)
GAS PNL BLDV: SIGNIFICANT CHANGE UP
GAS PNL BLDV: SIGNIFICANT CHANGE UP
GLUCOSE SERPL-MCNC: 141 MG/DL — HIGH (ref 70–99)
GLUCOSE UR QL: NEGATIVE MG/DL — SIGNIFICANT CHANGE UP
HCO3 BLDV-SCNC: 30 MMOL/L — HIGH (ref 22–29)
HCT VFR BLD CALC: 26.6 % — LOW (ref 37–47)
HCT VFR BLDA CALC: 26 % — LOW (ref 39–51)
HGB BLD CALC-MCNC: 8.8 G/DL — LOW (ref 12.6–17.4)
HGB BLD-MCNC: 8.5 G/DL — LOW (ref 12–16)
HYALINE CASTS # UR AUTO: 7 /LPF — HIGH (ref 0–4)
IMM GRANULOCYTES NFR BLD AUTO: 2.1 % — HIGH (ref 0.1–0.3)
INR BLD: 1.16 RATIO — SIGNIFICANT CHANGE UP (ref 0.65–1.3)
INR BLD: 1.25 RATIO — SIGNIFICANT CHANGE UP (ref 0.65–1.3)
KETONES UR-MCNC: NEGATIVE MG/DL — SIGNIFICANT CHANGE UP
LACTATE BLDV-MCNC: 4.8 MMOL/L — CRITICAL HIGH (ref 0.5–2)
LACTATE SERPL-SCNC: 3.7 MMOL/L — HIGH (ref 0.7–2)
LACTATE SERPL-SCNC: 4.7 MMOL/L — CRITICAL HIGH (ref 0.7–2)
LACTATE SERPL-SCNC: 5.1 MMOL/L — CRITICAL HIGH (ref 0.7–2)
LACTATE SERPL-SCNC: 6.3 MMOL/L — CRITICAL HIGH (ref 0.7–2)
LEUKOCYTE ESTERASE UR-ACNC: ABNORMAL
LYMPHOCYTES # BLD AUTO: 0.75 K/UL — LOW (ref 1.2–3.4)
LYMPHOCYTES # BLD AUTO: 4.4 % — LOW (ref 20.5–51.1)
MCHC RBC-ENTMCNC: 31.4 PG — HIGH (ref 27–31)
MCHC RBC-ENTMCNC: 32 G/DL — SIGNIFICANT CHANGE UP (ref 32–37)
MCV RBC AUTO: 98.2 FL — SIGNIFICANT CHANGE UP (ref 81–99)
MONOCYTES # BLD AUTO: 2.13 K/UL — HIGH (ref 0.1–0.6)
MONOCYTES NFR BLD AUTO: 12.4 % — HIGH (ref 1.7–9.3)
MRSA PCR RESULT.: NEGATIVE — SIGNIFICANT CHANGE UP
NEUTROPHILS # BLD AUTO: 13.94 K/UL — HIGH (ref 1.4–6.5)
NEUTROPHILS NFR BLD AUTO: 80.8 % — HIGH (ref 42.2–75.2)
NITRITE UR-MCNC: NEGATIVE — SIGNIFICANT CHANGE UP
NRBC # BLD: 0 /100 WBCS — SIGNIFICANT CHANGE UP (ref 0–0)
PCO2 BLDV: 49 MMHG — HIGH (ref 39–42)
PH BLDV: 7.39 — SIGNIFICANT CHANGE UP (ref 7.32–7.43)
PH UR: 6.5 — SIGNIFICANT CHANGE UP (ref 5–8)
PLATELET # BLD AUTO: 82 K/UL — LOW (ref 130–400)
PMV BLD: 9.5 FL — SIGNIFICANT CHANGE UP (ref 7.4–10.4)
PO2 BLDV: 47 MMHG — SIGNIFICANT CHANGE UP
POTASSIUM BLDV-SCNC: 3.8 MMOL/L — SIGNIFICANT CHANGE UP (ref 3.5–5.1)
POTASSIUM SERPL-MCNC: 3.6 MMOL/L — SIGNIFICANT CHANGE UP (ref 3.5–5)
POTASSIUM SERPL-SCNC: 3.6 MMOL/L — SIGNIFICANT CHANGE UP (ref 3.5–5)
PROT SERPL-MCNC: 5.2 G/DL — LOW (ref 6–8)
PROT UR-MCNC: SIGNIFICANT CHANGE UP MG/DL
PROTHROM AB SERPL-ACNC: 13.3 SEC — HIGH (ref 9.95–12.87)
PROTHROM AB SERPL-ACNC: 14.3 SEC — HIGH (ref 9.95–12.87)
RBC # BLD: 2.71 M/UL — LOW (ref 4.2–5.4)
RBC # FLD: 16.5 % — HIGH (ref 11.5–14.5)
RBC CASTS # UR COMP ASSIST: 10 /HPF — HIGH (ref 0–4)
SAO2 % BLDV: 79.7 % — SIGNIFICANT CHANGE UP
SODIUM SERPL-SCNC: 137 MMOL/L — SIGNIFICANT CHANGE UP (ref 135–146)
SP GR SPEC: 1.01 — SIGNIFICANT CHANGE UP (ref 1–1.03)
SQUAMOUS # UR AUTO: 7 /HPF — HIGH (ref 0–5)
TROPONIN T SERPL-MCNC: 0.02 NG/ML — HIGH
TROPONIN T SERPL-MCNC: 0.03 NG/ML — CRITICAL HIGH
UROBILINOGEN FLD QL: 0.2 MG/DL — SIGNIFICANT CHANGE UP (ref 0.2–1)
WBC # BLD: 17.24 K/UL — HIGH (ref 4.8–10.8)
WBC # FLD AUTO: 17.24 K/UL — HIGH (ref 4.8–10.8)
WBC UR QL: 139 /HPF — HIGH (ref 0–5)

## 2023-09-17 PROCEDURE — 94003 VENT MGMT INPAT SUBQ DAY: CPT

## 2023-09-17 PROCEDURE — 83540 ASSAY OF IRON: CPT

## 2023-09-17 PROCEDURE — 87070 CULTURE OTHR SPECIMN AEROBIC: CPT

## 2023-09-17 PROCEDURE — 83550 IRON BINDING TEST: CPT

## 2023-09-17 PROCEDURE — 84145 PROCALCITONIN (PCT): CPT

## 2023-09-17 PROCEDURE — 74177 CT ABD & PELVIS W/CONTRAST: CPT | Mod: 26,MA

## 2023-09-17 PROCEDURE — 87641 MR-STAPH DNA AMP PROBE: CPT

## 2023-09-17 PROCEDURE — 80053 COMPREHEN METABOLIC PANEL: CPT

## 2023-09-17 PROCEDURE — 85384 FIBRINOGEN ACTIVITY: CPT

## 2023-09-17 PROCEDURE — 87640 STAPH A DNA AMP PROBE: CPT

## 2023-09-17 PROCEDURE — 83735 ASSAY OF MAGNESIUM: CPT

## 2023-09-17 PROCEDURE — 80164 ASSAY DIPROPYLACETIC ACD TOT: CPT

## 2023-09-17 PROCEDURE — 93970 EXTREMITY STUDY: CPT | Mod: 26

## 2023-09-17 PROCEDURE — 85379 FIBRIN DEGRADATION QUANT: CPT

## 2023-09-17 PROCEDURE — 86900 BLOOD TYPING SEROLOGIC ABO: CPT

## 2023-09-17 PROCEDURE — 85730 THROMBOPLASTIN TIME PARTIAL: CPT

## 2023-09-17 PROCEDURE — 82746 ASSAY OF FOLIC ACID SERUM: CPT

## 2023-09-17 PROCEDURE — 85027 COMPLETE CBC AUTOMATED: CPT

## 2023-09-17 PROCEDURE — 85025 COMPLETE CBC W/AUTO DIFF WBC: CPT

## 2023-09-17 PROCEDURE — 84484 ASSAY OF TROPONIN QUANT: CPT

## 2023-09-17 PROCEDURE — 71045 X-RAY EXAM CHEST 1 VIEW: CPT

## 2023-09-17 PROCEDURE — 86140 C-REACTIVE PROTEIN: CPT

## 2023-09-17 PROCEDURE — 84295 ASSAY OF SERUM SODIUM: CPT

## 2023-09-17 PROCEDURE — 36415 COLL VENOUS BLD VENIPUNCTURE: CPT

## 2023-09-17 PROCEDURE — 85018 HEMOGLOBIN: CPT

## 2023-09-17 PROCEDURE — 87077 CULTURE AEROBIC IDENTIFY: CPT

## 2023-09-17 PROCEDURE — 71045 X-RAY EXAM CHEST 1 VIEW: CPT | Mod: 26

## 2023-09-17 PROCEDURE — 93306 TTE W/DOPPLER COMPLETE: CPT

## 2023-09-17 PROCEDURE — 94640 AIRWAY INHALATION TREATMENT: CPT

## 2023-09-17 PROCEDURE — 87184 SC STD DISK METHOD PER PLATE: CPT

## 2023-09-17 PROCEDURE — 71260 CT THORAX DX C+: CPT | Mod: 26,MA

## 2023-09-17 PROCEDURE — 86901 BLOOD TYPING SEROLOGIC RH(D): CPT

## 2023-09-17 PROCEDURE — 82607 VITAMIN B-12: CPT

## 2023-09-17 PROCEDURE — 0225U NFCT DS DNA&RNA 21 SARSCOV2: CPT

## 2023-09-17 PROCEDURE — 84132 ASSAY OF SERUM POTASSIUM: CPT

## 2023-09-17 PROCEDURE — 36556 INSERT NON-TUNNEL CV CATH: CPT | Mod: RT

## 2023-09-17 PROCEDURE — 82728 ASSAY OF FERRITIN: CPT

## 2023-09-17 PROCEDURE — 82803 BLOOD GASES ANY COMBINATION: CPT

## 2023-09-17 PROCEDURE — 93010 ELECTROCARDIOGRAM REPORT: CPT

## 2023-09-17 PROCEDURE — 81001 URINALYSIS AUTO W/SCOPE: CPT

## 2023-09-17 PROCEDURE — 94002 VENT MGMT INPAT INIT DAY: CPT

## 2023-09-17 PROCEDURE — 93970 EXTREMITY STUDY: CPT

## 2023-09-17 PROCEDURE — 82330 ASSAY OF CALCIUM: CPT

## 2023-09-17 PROCEDURE — 99285 EMERGENCY DEPT VISIT HI MDM: CPT | Mod: 25

## 2023-09-17 PROCEDURE — 85014 HEMATOCRIT: CPT

## 2023-09-17 PROCEDURE — 84466 ASSAY OF TRANSFERRIN: CPT

## 2023-09-17 PROCEDURE — 83605 ASSAY OF LACTIC ACID: CPT

## 2023-09-17 PROCEDURE — 87186 SC STD MICRODIL/AGAR DIL: CPT

## 2023-09-17 PROCEDURE — 94799 UNLISTED PULMONARY SVC/PX: CPT

## 2023-09-17 PROCEDURE — 94760 N-INVAS EAR/PLS OXIMETRY 1: CPT

## 2023-09-17 PROCEDURE — 86850 RBC ANTIBODY SCREEN: CPT

## 2023-09-17 PROCEDURE — 83935 ASSAY OF URINE OSMOLALITY: CPT

## 2023-09-17 PROCEDURE — 85610 PROTHROMBIN TIME: CPT

## 2023-09-17 RX ORDER — LEVOTHYROXINE SODIUM 125 MCG
1 TABLET ORAL
Qty: 0 | Refills: 0 | DISCHARGE

## 2023-09-17 RX ORDER — SENNA PLUS 8.6 MG/1
2 TABLET ORAL AT BEDTIME
Refills: 0 | Status: DISCONTINUED | OUTPATIENT
Start: 2023-09-17 | End: 2023-09-26

## 2023-09-17 RX ORDER — VALPROIC ACID (AS SODIUM SALT) 250 MG/5ML
250 SOLUTION, ORAL ORAL THREE TIMES A DAY
Refills: 0 | Status: DISCONTINUED | OUTPATIENT
Start: 2023-09-17 | End: 2023-09-26

## 2023-09-17 RX ORDER — LATANOPROST 0.05 MG/ML
1 SOLUTION/ DROPS OPHTHALMIC; TOPICAL
Qty: 0 | Refills: 0 | DISCHARGE

## 2023-09-17 RX ORDER — BACITRACIN ZINC 500 UNIT/G
1 OINTMENT IN PACKET (EA) TOPICAL
Refills: 0 | Status: DISCONTINUED | OUTPATIENT
Start: 2023-09-17 | End: 2023-09-26

## 2023-09-17 RX ORDER — FERROUS SULFATE 325(65) MG
5 TABLET ORAL
Refills: 0 | DISCHARGE

## 2023-09-17 RX ORDER — HEPARIN SODIUM 5000 [USP'U]/ML
5000 INJECTION INTRAVENOUS; SUBCUTANEOUS EVERY 8 HOURS
Refills: 0 | Status: DISCONTINUED | OUTPATIENT
Start: 2023-09-17 | End: 2023-09-18

## 2023-09-17 RX ORDER — CHLORHEXIDINE GLUCONATE 213 G/1000ML
15 SOLUTION TOPICAL
Refills: 0 | DISCHARGE

## 2023-09-17 RX ORDER — SODIUM CHLORIDE 9 MG/ML
1 INJECTION INTRAMUSCULAR; INTRAVENOUS; SUBCUTANEOUS
Refills: 0 | DISCHARGE

## 2023-09-17 RX ORDER — CEFEPIME 1 G/1
2000 INJECTION, POWDER, FOR SOLUTION INTRAMUSCULAR; INTRAVENOUS ONCE
Refills: 0 | Status: COMPLETED | OUTPATIENT
Start: 2023-09-17 | End: 2023-09-17

## 2023-09-17 RX ORDER — MIRTAZAPINE 45 MG/1
1 TABLET, ORALLY DISINTEGRATING ORAL
Qty: 0 | Refills: 0 | DISCHARGE

## 2023-09-17 RX ORDER — VALPROIC ACID (AS SODIUM SALT) 250 MG/5ML
5 SOLUTION, ORAL ORAL
Refills: 0 | DISCHARGE

## 2023-09-17 RX ORDER — SODIUM CHLORIDE 9 MG/ML
2400 INJECTION, SOLUTION INTRAVENOUS ONCE
Refills: 0 | Status: COMPLETED | OUTPATIENT
Start: 2023-09-17 | End: 2023-09-17

## 2023-09-17 RX ORDER — ATORVASTATIN CALCIUM 80 MG/1
80 TABLET, FILM COATED ORAL AT BEDTIME
Refills: 0 | Status: DISCONTINUED | OUTPATIENT
Start: 2023-09-17 | End: 2023-09-26

## 2023-09-17 RX ORDER — ACETAMINOPHEN 500 MG
650 TABLET ORAL EVERY 6 HOURS
Refills: 0 | Status: DISCONTINUED | OUTPATIENT
Start: 2023-09-17 | End: 2023-09-26

## 2023-09-17 RX ORDER — AZTREONAM 2 G
2000 VIAL (EA) INJECTION ONCE
Refills: 0 | Status: COMPLETED | OUTPATIENT
Start: 2023-09-17 | End: 2023-09-17

## 2023-09-17 RX ORDER — METRONIDAZOLE 500 MG
TABLET ORAL
Refills: 0 | Status: DISCONTINUED | OUTPATIENT
Start: 2023-09-17 | End: 2023-09-17

## 2023-09-17 RX ORDER — CHLORHEXIDINE GLUCONATE 213 G/1000ML
15 SOLUTION TOPICAL
Refills: 0 | Status: DISCONTINUED | OUTPATIENT
Start: 2023-09-17 | End: 2023-09-26

## 2023-09-17 RX ORDER — BACITRACIN ZINC 500 UNIT/G
1 OINTMENT IN PACKET (EA) TOPICAL
Refills: 0 | DISCHARGE

## 2023-09-17 RX ORDER — IPRATROPIUM BROMIDE 0.2 MG/ML
2 SOLUTION, NON-ORAL INHALATION
Qty: 0 | Refills: 0 | DISCHARGE

## 2023-09-17 RX ORDER — ATORVASTATIN CALCIUM 80 MG/1
0 TABLET, FILM COATED ORAL
Qty: 0 | Refills: 0 | DISCHARGE

## 2023-09-17 RX ORDER — CEFEPIME 1 G/1
2000 INJECTION, POWDER, FOR SOLUTION INTRAMUSCULAR; INTRAVENOUS EVERY 8 HOURS
Refills: 0 | Status: DISCONTINUED | OUTPATIENT
Start: 2023-09-17 | End: 2023-09-17

## 2023-09-17 RX ORDER — AZTREONAM 2 G
VIAL (EA) INJECTION
Refills: 0 | Status: DISCONTINUED | OUTPATIENT
Start: 2023-09-17 | End: 2023-09-17

## 2023-09-17 RX ORDER — SENNA PLUS 8.6 MG/1
1 TABLET ORAL
Refills: 0 | DISCHARGE

## 2023-09-17 RX ORDER — VANCOMYCIN HCL 1 G
1000 VIAL (EA) INTRAVENOUS EVERY 12 HOURS
Refills: 0 | Status: DISCONTINUED | OUTPATIENT
Start: 2023-09-17 | End: 2023-09-18

## 2023-09-17 RX ORDER — SODIUM CHLORIDE 9 MG/ML
1 INJECTION INTRAMUSCULAR; INTRAVENOUS; SUBCUTANEOUS
Refills: 0 | Status: DISCONTINUED | OUTPATIENT
Start: 2023-09-17 | End: 2023-09-26

## 2023-09-17 RX ORDER — METRONIDAZOLE 500 MG
500 TABLET ORAL ONCE
Refills: 0 | Status: DISCONTINUED | OUTPATIENT
Start: 2023-09-17 | End: 2023-09-17

## 2023-09-17 RX ORDER — LEVOTHYROXINE SODIUM 125 MCG
100 TABLET ORAL DAILY
Refills: 0 | Status: DISCONTINUED | OUTPATIENT
Start: 2023-09-17 | End: 2023-09-26

## 2023-09-17 RX ORDER — LEVOTHYROXINE SODIUM 125 MCG
1 TABLET ORAL
Refills: 0 | DISCHARGE

## 2023-09-17 RX ORDER — MIRTAZAPINE 45 MG/1
15 TABLET, ORALLY DISINTEGRATING ORAL DAILY
Refills: 0 | Status: DISCONTINUED | OUTPATIENT
Start: 2023-09-17 | End: 2023-09-26

## 2023-09-17 RX ORDER — AZTREONAM 2 G
2000 VIAL (EA) INJECTION EVERY 8 HOURS
Refills: 0 | Status: DISCONTINUED | OUTPATIENT
Start: 2023-09-17 | End: 2023-09-17

## 2023-09-17 RX ORDER — CEFEPIME 1 G/1
2000 INJECTION, POWDER, FOR SOLUTION INTRAMUSCULAR; INTRAVENOUS EVERY 8 HOURS
Refills: 0 | Status: ACTIVE | OUTPATIENT
Start: 2023-09-18 | End: 2024-08-16

## 2023-09-17 RX ORDER — NYSTATIN CREAM 100000 [USP'U]/G
1 CREAM TOPICAL
Refills: 0 | DISCHARGE

## 2023-09-17 RX ORDER — VANCOMYCIN HCL 1 G
VIAL (EA) INTRAVENOUS
Refills: 0 | Status: DISCONTINUED | OUTPATIENT
Start: 2023-09-17 | End: 2023-09-17

## 2023-09-17 RX ORDER — VANCOMYCIN HCL 1 G
1000 VIAL (EA) INTRAVENOUS ONCE
Refills: 0 | Status: COMPLETED | OUTPATIENT
Start: 2023-09-17 | End: 2023-09-17

## 2023-09-17 RX ORDER — ASCORBIC ACID 60 MG
5 TABLET,CHEWABLE ORAL
Refills: 0 | DISCHARGE

## 2023-09-17 RX ORDER — METRONIDAZOLE 500 MG
500 TABLET ORAL EVERY 8 HOURS
Refills: 0 | Status: DISCONTINUED | OUTPATIENT
Start: 2023-09-17 | End: 2023-09-17

## 2023-09-17 RX ORDER — FUROSEMIDE 40 MG
1 TABLET ORAL
Refills: 0 | DISCHARGE

## 2023-09-17 RX ORDER — LATANOPROST 0.05 MG/ML
1 SOLUTION/ DROPS OPHTHALMIC; TOPICAL AT BEDTIME
Refills: 0 | Status: DISCONTINUED | OUTPATIENT
Start: 2023-09-17 | End: 2023-09-26

## 2023-09-17 RX ORDER — ATORVASTATIN CALCIUM 80 MG/1
1 TABLET, FILM COATED ORAL
Refills: 0 | DISCHARGE

## 2023-09-17 RX ORDER — BRIVARACETAM 25 MG/1
50 TABLET, FILM COATED ORAL
Refills: 0 | Status: DISCONTINUED | OUTPATIENT
Start: 2023-09-17 | End: 2023-09-18

## 2023-09-17 RX ORDER — VIBEGRON 75 MG/1
1 TABLET, FILM COATED ORAL
Refills: 0 | DISCHARGE

## 2023-09-17 RX ORDER — IPRATROPIUM/ALBUTEROL SULFATE 18-103MCG
3 AEROSOL WITH ADAPTER (GRAM) INHALATION EVERY 6 HOURS
Refills: 0 | Status: DISCONTINUED | OUTPATIENT
Start: 2023-09-17 | End: 2023-09-26

## 2023-09-17 RX ORDER — NOREPINEPHRINE BITARTRATE/D5W 8 MG/250ML
0.05 PLASTIC BAG, INJECTION (ML) INTRAVENOUS
Qty: 16 | Refills: 0 | Status: DISCONTINUED | OUTPATIENT
Start: 2023-09-17 | End: 2023-09-22

## 2023-09-17 RX ORDER — SOLIFENACIN SUCCINATE 10 MG/1
1 TABLET ORAL
Qty: 0 | Refills: 0 | DISCHARGE

## 2023-09-17 RX ORDER — RISPERIDONE 4 MG/1
1 TABLET ORAL
Qty: 0 | Refills: 0 | DISCHARGE

## 2023-09-17 RX ORDER — CEFEPIME 1 G/1
INJECTION, POWDER, FOR SOLUTION INTRAMUSCULAR; INTRAVENOUS
Refills: 0 | Status: DISCONTINUED | OUTPATIENT
Start: 2023-09-17 | End: 2023-09-17

## 2023-09-17 RX ORDER — DOCUSATE SODIUM 100 MG
1 CAPSULE ORAL
Qty: 0 | Refills: 0 | DISCHARGE

## 2023-09-17 RX ORDER — IPRATROPIUM BROMIDE 0.2 MG/ML
2 SOLUTION, NON-ORAL INHALATION
Refills: 0 | DISCHARGE

## 2023-09-17 RX ORDER — LATANOPROST 0.05 MG/ML
1 SOLUTION/ DROPS OPHTHALMIC; TOPICAL
Refills: 0 | DISCHARGE

## 2023-09-17 RX ORDER — CLOPIDOGREL BISULFATE 75 MG/1
1 TABLET, FILM COATED ORAL
Qty: 0 | Refills: 0 | DISCHARGE

## 2023-09-17 RX ORDER — FAMOTIDINE 10 MG/ML
1 INJECTION INTRAVENOUS
Refills: 0 | DISCHARGE

## 2023-09-17 RX ORDER — LACTULOSE 10 G/15ML
30 SOLUTION ORAL
Qty: 0 | Refills: 0 | DISCHARGE

## 2023-09-17 RX ORDER — FERROUS SULFATE 325(65) MG
300 TABLET ORAL DAILY
Refills: 0 | Status: DISCONTINUED | OUTPATIENT
Start: 2023-09-17 | End: 2023-09-26

## 2023-09-17 RX ORDER — MIRTAZAPINE 45 MG/1
1 TABLET, ORALLY DISINTEGRATING ORAL
Refills: 0 | DISCHARGE

## 2023-09-17 RX ORDER — IPRATROPIUM BROMIDE 0.2 MG/ML
1 SOLUTION, NON-ORAL INHALATION EVERY 6 HOURS
Refills: 0 | Status: DISCONTINUED | OUTPATIENT
Start: 2023-09-17 | End: 2023-09-17

## 2023-09-17 RX ORDER — BRIVARACETAM 25 MG/1
5 TABLET, FILM COATED ORAL
Refills: 0 | DISCHARGE

## 2023-09-17 RX ORDER — GABAPENTIN 400 MG/1
1 CAPSULE ORAL
Qty: 0 | Refills: 0 | DISCHARGE

## 2023-09-17 RX ORDER — FAMOTIDINE 10 MG/ML
20 INJECTION INTRAVENOUS
Refills: 0 | Status: DISCONTINUED | OUTPATIENT
Start: 2023-09-17 | End: 2023-09-26

## 2023-09-17 RX ADMIN — CHLORHEXIDINE GLUCONATE 15 MILLILITER(S): 213 SOLUTION TOPICAL at 18:10

## 2023-09-17 RX ADMIN — Medication 650 MILLIGRAM(S): at 22:54

## 2023-09-17 RX ADMIN — Medication 250 MILLIGRAM(S): at 23:06

## 2023-09-17 RX ADMIN — Medication 250 MILLIGRAM(S): at 12:13

## 2023-09-17 RX ADMIN — CEFEPIME 100 MILLIGRAM(S): 1 INJECTION, POWDER, FOR SOLUTION INTRAMUSCULAR; INTRAVENOUS at 20:12

## 2023-09-17 RX ADMIN — Medication 250 MILLIGRAM(S): at 22:55

## 2023-09-17 RX ADMIN — FAMOTIDINE 20 MILLIGRAM(S): 10 INJECTION INTRAVENOUS at 22:55

## 2023-09-17 RX ADMIN — SENNA PLUS 2 TABLET(S): 8.6 TABLET ORAL at 22:55

## 2023-09-17 RX ADMIN — SODIUM CHLORIDE 1 GRAM(S): 9 INJECTION INTRAMUSCULAR; INTRAVENOUS; SUBCUTANEOUS at 22:54

## 2023-09-17 RX ADMIN — Medication 3.57 MICROGRAM(S)/KG/MIN: at 23:50

## 2023-09-17 RX ADMIN — HEPARIN SODIUM 5000 UNIT(S): 5000 INJECTION INTRAVENOUS; SUBCUTANEOUS at 22:55

## 2023-09-17 RX ADMIN — ATORVASTATIN CALCIUM 80 MILLIGRAM(S): 80 TABLET, FILM COATED ORAL at 22:55

## 2023-09-17 RX ADMIN — SODIUM CHLORIDE 2400 MILLILITER(S): 9 INJECTION, SOLUTION INTRAVENOUS at 12:13

## 2023-09-17 RX ADMIN — LATANOPROST 1 DROP(S): 0.05 SOLUTION/ DROPS OPHTHALMIC; TOPICAL at 22:54

## 2023-09-17 RX ADMIN — Medication 1 APPLICATION(S): at 18:10

## 2023-09-17 RX ADMIN — Medication 100 MILLIGRAM(S): at 12:36

## 2023-09-17 RX ADMIN — Medication 650 MILLIGRAM(S): at 23:55

## 2023-09-17 NOTE — H&P ADULT - NSHPLABSRESULTS_GEN_ALL_CORE
8.5    .24 )-----------( 82       ( 17 Sep 2023 11:38 )             26.6           137  |  100  |  16  ----------------------------<  141<H>  3.6   |  27  |  0.6<L>    Ca    7.9<L>      17 Sep 2023 11:38    TPro  5.2<L>  /  Alb  2.1<L>  /  TBili  0.3  /  DBili  x   /  AST  38  /  ALT  20  /  AlkPhos  156<H>                Urinalysis Basic - ( 17 Sep 2023 11:52 )    Color: Yellow / Appearance: Cloudy / S.012 / pH: x  Gluc: x / Ketone: Negative mg/dL  / Bili: Negative / Urobili: 0.2 mg/dL   Blood: x / Protein: Trace mg/dL / Nitrite: Negative   Leuk Esterase: Large / RBC: 10 /HPF /  /HPF   Sq Epi: x / Non Sq Epi: 7 /HPF / Bacteria: Few /HPF        PT/INR - ( 17 Sep 2023 16:21 )   PT: 13.30 sec;   INR: 1.16 ratio         PTT - ( 17 Sep 2023 16:21 )  PTT:28.5 sec    Lactate Trend   @ 16:21 Lactate:4.7    @ 13:55 Lactate:6.3    @ 11:38 Lactate:5.1       CARDIAC MARKERS ( 17 Sep 2023 11:38 )  x     / 0.02 ng/mL / x     / x     / x            CAPILLARY BLOOD GLUCOSE

## 2023-09-17 NOTE — H&P ADULT - NSHPPHYSICALEXAM_GEN_ALL_CORE
PHYSICAL EXAM:  GENERAL: critically ill patient, on tracheostomy   EYES: pale skin  ENMT: No tonsillar erythema, exudates, or enlargement; Moist mucous membranes  NECK: Supple, No JVD, Normal thyroid  HEART: Regular rate and rhythm; No murmurs, rubs, or gallops, Normal S1 S2   RESPIRATORY: decreased air entry bilaterally with crackles mainly on left lung    ABDOMEN: Soft, distended abdomen, non tender with ascites   NEUROLOGY: Opening her eyes, withdraw to pain, no other purposeful movement   EXTREMITIES:  diffuse bilateral upper and lower limb edema, ascites  SKIN: warm thin skin, bilateral diffuse skin lesion

## 2023-09-17 NOTE — ED PROVIDER NOTE - PHYSICAL EXAMINATION
CONSTITUTIONAL: chronically ill appearing  SKIN: Warm dry, normal skin turgor  HEAD: NCAT  EYES: EOMI,  ENT: normal pharynx  NECK: Supple;  CARD: RRR.  RESP: clear to ausculation b/l.  ABD: soft, non-tender, non-distended, no rebound or guarding.  EXT: contracted extremities.   NEURO: limited s/2 trach to vent status

## 2023-09-17 NOTE — H&P ADULT - ASSESSMENT
a 78 year old female with past medical history of tracheostomy due to acute hypoxemic respiratory failure from COVID 2020, Hx of staph bacteremia, Candida auris, PEG tube placement due to oropharyngeal dysphagia, hypothyroidism, bradycardia, C5 compression deformity, pancytopenia, bipolar disorder, glaucoma, schizoaffective disorder, COPD, HLD, CVA, seizure disorder, GERD, S/P left hip ORIF.     Presenting from [facility] for evaluation of episodes of bradycardia tachycardia, fever, low blood pressure. found to be hypotensive in the field requiring two pushes of epinephrine.   in the ED found to be hypotensive, started on bolus of LR, right femoral line was inserted, given 1 dose of vancomycin and aztreonam.       # Septic shock due to pneumonia and UTI   # Hx of COPD   # Hx of acute hypoxemic respiratory failure S/P tracheostomy   # Hx of staphylococcus bacteremia, candida auris   # Hx of PEG due to oropharyngeal dysphagia   - Home vent settings: AC mode 400/20/40%/5  - Currently on Levophed 0.05, Vent settings 400/20/40%/10  - CT chest showed right lower lobar atelectasis with mucus plug, possible underlying aspiration pneumonia   - UA is positive   - Start Duoneb   - Start Vancomycin, cefepime and Levaquin   - Urine and blood cultures are pending   - send MRSA and RVP nares  - Start Steroids if wheezing  - wean     # Seizure disorder  - Continue home valproic acid and brivaracetam    # Hypothyroidism  - Continue home levothyroxine      # Schizoaffective disorder  - Continue home mirtazapine     # Glaucoma  - Continue home latanoprost     # GERD   - Continue home famotidine 20X2     # Hx of CVA  # hyperlipidemia   - Continue home atorvastatin     # Chronic hyponatremia  - Continue home Sodium tabs 1gX2    # Overactive bladder  - On home Vibegron  - was retaining in ED, Davis's catheter placed       Impression  # Septic shock due to pneumonia and UTI   # Hx of COPD   # Hx of acute hypoxemic respiratory failure S/P tracheostomy   # Hx of staphylococcus bacteremia, candida auris   # Hx of PEG due to oropharyngeal dysphagia   # Hypothyroidism  # Chronic Hyponatremia   # Pancytopenia, anemia  # Glaucoma  # GERD  # Seizure disorder  # Hx of CVA   # Hyperlipidemia     Assessment & Plan     #Neuro: Avoid sedation, continue home antiepileptic medications    #Cardiovascular: Obtain TTE, maintain MAP> 65, start Levophed as needed, Hold home Lasix. Trend troponin.     #PULMONARY: Tracheostomy care, keep saturation between 92-96%, D-dimer 2600, follow up LL duplex, start Duoneb, if wheezing start Solumedrol, CT chest noted, daily CXR, ABG in AM      #GI: GI prophylaxis, PEG feeding     #INFECTIOUS DISEASE: Start Cefepime, Levaquin and Vancomycin, send MRSA nares, RVP, follow up urine and blood cultures, trend lactate     #RENAL: Correct lytes as needed, continue sodium tabs 1gX2    #Endocrine: continue levothyroxine     #Hematology: Follow up CBC, heparin for prophylaxis, negative DIC, keep active type and screen     #MSK: bilateral upper and lower limb edema, obtain Duplex to rule out DVT      #Skin: care as needed      #Code Status: Full code  Davis's catheter 9/17  right femoral central 9/17

## 2023-09-17 NOTE — ED PROVIDER NOTE - CLINICAL SUMMARY MEDICAL DECISION MAKING FREE TEXT BOX
78-year-old female presenting today for evaluation of hypotension, fevers and tachycardia.  Patient found to be septic during ED stay.  Central and is placed secondary to lack of access.  Patient was given broad-spectrum antibiotics.  Source is likely pneumonia and urine.  Patient admitted to ICU for further evaluation and care.    Attending Statement: I have personally provided the amount of critical care time documented below excluding time spent on separate procedures.     Critical Care Time Spent (min) Must be 30 or more minutes to qualify: 35.

## 2023-09-17 NOTE — ED PROVIDER NOTE - PROGRESS NOTE DETAILS
pk: sepsis workup initiated, emergent CVC placed in the right fem. abx and fluids started. will start pressors if bp drops after 2L IVF. pk: ct chest abd pelvis, urine and labs reviewed, bp stable after fluids, admitted to ICU

## 2023-09-17 NOTE — ED ADULT NURSE NOTE - NSFALLHARMRISKINTERV_ED_ALL_ED

## 2023-09-17 NOTE — PATIENT PROFILE ADULT - FALL HARM RISK - HARM RISK INTERVENTIONS

## 2023-09-17 NOTE — ED PROVIDER NOTE - NSICDXPASTMEDICALHX_GEN_ALL_CORE_FT
PAST MEDICAL HISTORY:  Anemia     Bipolar disorder     COPD (chronic obstructive pulmonary disease)     CVA (cerebral vascular accident)     Glaucoma, unspecified glaucoma type, unspecified laterality     HLD (hyperlipidemia)     Pseudoseizure     Schizoaffective disorder, depressive type     Vertigo      Senait Santo  (RN)  2019 03:18:59

## 2023-09-17 NOTE — ED PROCEDURE NOTE - NS ED ATTENDING STATEMENT MOD
Operative/Inv Procedure Report
Surgery Date: 03/07/18
Name of Procedure:
D&C hysteroscopy NovaSure ablation
Pre-Operative Diagnosis:
Menorrhagia
Post-Operative Diagnosis:
Same
Estimated Blood Loss: scant
Surgeon/Assistant:
Juan Norris MD
 
Anesthesia: laryngeal mask airway, local monitored anesthesi
 
Operative/Procedure Note
Note:
The patient was brought to the operating room placed on the OR table in the 
dorsal supine position.  She was given adequate anesthesia and repositioned into
modified dorsal lithotomy.  She is prepped and draped in usual sterile fashion. 
A weighted speculum was inserted into the vagina and with the help of a Elise 
retractor single-tooth tenaculum was attached to the anterior lip.  Cervix was 
injected with 1% lidocaine to have cc in each quadrant.  An endocervical 
curettage was performed revealing a small amount of tissue.  The uterus is then 
sounded to 8 cm.  The cervix was then serially dilated to accommodate the 
hysteroscope hysteroscope was placed in the fundus and the saline infusion was 
activated.  No polyps or fibroids are noted hysteroscope was removed.  Cervix 
was further dilated and sharp curettage followed revealing a moderate amount 
tissue.  At this point the NovaSure was placed into the uterus and opened.  Seal
was tested and noted to be accurate and the procedure started at 66 W of power. 
It lasted less than 90 seconds.  At the end of the procedure the instrument was 
removed and noted to be intact hemostasis was good and all instruments removed 
from the vagina.  The patient was awakened and sent to recovery in good 
condition.
 
All needle, sponge, and is recalcitrant correct at the end of the procedure 2.
Attending with

## 2023-09-17 NOTE — ED PROVIDER NOTE - OBJECTIVE STATEMENT
Patient is a 78-year-old female past medical history of COPD prior CVA hyperlipidemia pseudoseizures schizoaffective disorder vertigo and anemia trach to vent presenting from [facility] for evaluation of episodes of bradycardia tachycardia, fever, low blood pressure. found to be hypotensive in the field requiring two doses push epi. o/w further history unable to be obtained.

## 2023-09-17 NOTE — H&P ADULT - HISTORY OF PRESENT ILLNESS
a 78 year old female with past medical history of tracheostomy due to acute hypoxemic respiratory failure from COVID 2020, Hx of staph bacteremia, Candida auris, PEG tube placement due to oropharyngeal dysphagia, hypothyroidism, bradycardia, C5 compression deformity, pancytopenia, bipolar disorder, glaucoma, schizoaffective disorder, COPD, HLD, CVA, seizure disorder, GERD, S/P left hip ORIF.     Presenting from [facility] for evaluation of episodes of bradycardia tachycardia, fever, low blood pressure. found to be hypotensive in the field requiring two pushes of epinephrine.   in the ED found to be hypotensive, started on bolus of LR, right femoral line was inserted, given 1 dose of vancomycin and aztreonam.   Didn't require pressors.  last lactate 6  admitted to ICU for further evaluation.

## 2023-09-18 LAB
ALBUMIN SERPL ELPH-MCNC: 2.5 G/DL — LOW (ref 3.5–5.2)
ALP SERPL-CCNC: 148 U/L — HIGH (ref 30–115)
ALT FLD-CCNC: 21 U/L — SIGNIFICANT CHANGE UP (ref 0–41)
ANION GAP SERPL CALC-SCNC: 9 MMOL/L — SIGNIFICANT CHANGE UP (ref 7–14)
APPEARANCE UR: CLEAR — SIGNIFICANT CHANGE UP
AST SERPL-CCNC: 36 U/L — SIGNIFICANT CHANGE UP (ref 0–41)
BASE EXCESS BLDA CALC-SCNC: 7.1 MMOL/L — HIGH (ref -2–3)
BASOPHILS # BLD AUTO: 0.05 K/UL — SIGNIFICANT CHANGE UP (ref 0–0.2)
BASOPHILS NFR BLD AUTO: 0.2 % — SIGNIFICANT CHANGE UP (ref 0–1)
BILIRUB SERPL-MCNC: 0.5 MG/DL — SIGNIFICANT CHANGE UP (ref 0.2–1.2)
BILIRUB UR-MCNC: ABNORMAL
BUN SERPL-MCNC: 17 MG/DL — SIGNIFICANT CHANGE UP (ref 10–20)
CALCIUM SERPL-MCNC: 8.7 MG/DL — SIGNIFICANT CHANGE UP (ref 8.4–10.5)
CHLORIDE SERPL-SCNC: 97 MMOL/L — LOW (ref 98–110)
CO2 SERPL-SCNC: 30 MMOL/L — SIGNIFICANT CHANGE UP (ref 17–32)
COLOR SPEC: SIGNIFICANT CHANGE UP
CREAT SERPL-MCNC: 0.6 MG/DL — LOW (ref 0.7–1.5)
DIFF PNL FLD: NEGATIVE — SIGNIFICANT CHANGE UP
EGFR: 91 ML/MIN/1.73M2 — SIGNIFICANT CHANGE UP
EOSINOPHIL # BLD AUTO: 0.3 K/UL — SIGNIFICANT CHANGE UP (ref 0–0.7)
EOSINOPHIL NFR BLD AUTO: 1.5 % — SIGNIFICANT CHANGE UP (ref 0–8)
FERRITIN SERPL-MCNC: 475 NG/ML — HIGH (ref 13–330)
FOLATE SERPL-MCNC: 15.9 NG/ML — SIGNIFICANT CHANGE UP
GLUCOSE SERPL-MCNC: 95 MG/DL — SIGNIFICANT CHANGE UP (ref 70–99)
GLUCOSE UR QL: NEGATIVE MG/DL — SIGNIFICANT CHANGE UP
HCO3 BLDA-SCNC: 32 MMOL/L — HIGH (ref 21–28)
HCT VFR BLD CALC: 26.4 % — LOW (ref 37–47)
HGB BLD-MCNC: 8.5 G/DL — LOW (ref 12–16)
HOROWITZ INDEX BLDA+IHG-RTO: 40 — SIGNIFICANT CHANGE UP
IMM GRANULOCYTES NFR BLD AUTO: 2.2 % — HIGH (ref 0.1–0.3)
IRON SATN MFR SERPL: 14 % — LOW (ref 15–50)
IRON SATN MFR SERPL: 27 UG/DL — LOW (ref 35–150)
KETONES UR-MCNC: ABNORMAL MG/DL
LEUKOCYTE ESTERASE UR-ACNC: ABNORMAL
LYMPHOCYTES # BLD AUTO: 0.8 K/UL — LOW (ref 1.2–3.4)
LYMPHOCYTES # BLD AUTO: 3.9 % — LOW (ref 20.5–51.1)
MAGNESIUM SERPL-MCNC: 1.2 MG/DL — LOW (ref 1.8–2.4)
MCHC RBC-ENTMCNC: 31.6 PG — HIGH (ref 27–31)
MCHC RBC-ENTMCNC: 32.2 G/DL — SIGNIFICANT CHANGE UP (ref 32–37)
MCV RBC AUTO: 98.1 FL — SIGNIFICANT CHANGE UP (ref 81–99)
MONOCYTES # BLD AUTO: 1.11 K/UL — HIGH (ref 0.1–0.6)
MONOCYTES NFR BLD AUTO: 5.4 % — SIGNIFICANT CHANGE UP (ref 1.7–9.3)
MRSA PCR RESULT.: NEGATIVE — SIGNIFICANT CHANGE UP
NEUTROPHILS # BLD AUTO: 17.96 K/UL — HIGH (ref 1.4–6.5)
NEUTROPHILS NFR BLD AUTO: 86.8 % — HIGH (ref 42.2–75.2)
NITRITE UR-MCNC: NEGATIVE — SIGNIFICANT CHANGE UP
NRBC # BLD: 0 /100 WBCS — SIGNIFICANT CHANGE UP (ref 0–0)
OSMOLALITY UR: 402 MOS/KG — SIGNIFICANT CHANGE UP (ref 50–1200)
PCO2 BLDA: 45 MMHG — SIGNIFICANT CHANGE UP (ref 25–48)
PH BLDA: 7.46 — HIGH (ref 7.35–7.45)
PH UR: 5.5 — SIGNIFICANT CHANGE UP (ref 5–8)
PLATELET # BLD AUTO: 89 K/UL — LOW (ref 130–400)
PMV BLD: 9.1 FL — SIGNIFICANT CHANGE UP (ref 7.4–10.4)
PO2 BLDA: 61 MMHG — LOW (ref 83–108)
POTASSIUM SERPL-MCNC: 3.5 MMOL/L — SIGNIFICANT CHANGE UP (ref 3.5–5)
POTASSIUM SERPL-SCNC: 3.5 MMOL/L — SIGNIFICANT CHANGE UP (ref 3.5–5)
PROCALCITONIN SERPL-MCNC: 5.17 NG/ML — HIGH (ref 0.02–0.1)
PROT SERPL-MCNC: 5.3 G/DL — LOW (ref 6–8)
PROT UR-MCNC: 30 MG/DL
RAPID RVP RESULT: SIGNIFICANT CHANGE UP
RBC # BLD: 2.69 M/UL — LOW (ref 4.2–5.4)
RBC # FLD: 17.1 % — HIGH (ref 11.5–14.5)
SAO2 % BLDA: 92.3 % — LOW (ref 94–98)
SARS-COV-2 RNA SPEC QL NAA+PROBE: SIGNIFICANT CHANGE UP
SODIUM SERPL-SCNC: 136 MMOL/L — SIGNIFICANT CHANGE UP (ref 135–146)
SP GR SPEC: >1.03 — HIGH (ref 1–1.03)
TIBC SERPL-MCNC: 190 UG/DL — LOW (ref 220–430)
TRANSFERRIN SERPL-MCNC: 172 MG/DL — LOW (ref 200–360)
TROPONIN T SERPL-MCNC: 0.03 NG/ML — CRITICAL HIGH
UIBC SERPL-MCNC: 163 UG/DL — SIGNIFICANT CHANGE UP (ref 110–370)
UROBILINOGEN FLD QL: 0.2 MG/DL — SIGNIFICANT CHANGE UP (ref 0.2–1)
VIT B12 SERPL-MCNC: >2000 PG/ML — HIGH (ref 232–1245)
WBC # BLD: 20.68 K/UL — HIGH (ref 4.8–10.8)
WBC # FLD AUTO: 20.68 K/UL — HIGH (ref 4.8–10.8)

## 2023-09-18 PROCEDURE — 93306 TTE W/DOPPLER COMPLETE: CPT | Mod: 26

## 2023-09-18 PROCEDURE — 71045 X-RAY EXAM CHEST 1 VIEW: CPT | Mod: 26

## 2023-09-18 PROCEDURE — 99291 CRITICAL CARE FIRST HOUR: CPT

## 2023-09-18 RX ORDER — MAGNESIUM SULFATE 500 MG/ML
2 VIAL (ML) INJECTION ONCE
Refills: 0 | Status: COMPLETED | OUTPATIENT
Start: 2023-09-18 | End: 2023-09-18

## 2023-09-18 RX ORDER — BRIVARACETAM 25 MG/1
50 TABLET, FILM COATED ORAL
Refills: 0 | Status: DISCONTINUED | OUTPATIENT
Start: 2023-09-18 | End: 2023-09-26

## 2023-09-18 RX ORDER — CHLORHEXIDINE GLUCONATE 213 G/1000ML
1 SOLUTION TOPICAL
Refills: 0 | Status: DISCONTINUED | OUTPATIENT
Start: 2023-09-18 | End: 2023-09-26

## 2023-09-18 RX ORDER — POTASSIUM CHLORIDE 20 MEQ
40 PACKET (EA) ORAL EVERY 4 HOURS
Refills: 0 | Status: COMPLETED | OUTPATIENT
Start: 2023-09-18 | End: 2023-09-18

## 2023-09-18 RX ORDER — ENOXAPARIN SODIUM 100 MG/ML
40 INJECTION SUBCUTANEOUS EVERY 24 HOURS
Refills: 0 | Status: DISCONTINUED | OUTPATIENT
Start: 2023-09-18 | End: 2023-09-26

## 2023-09-18 RX ADMIN — CEFEPIME 100 MILLIGRAM(S): 1 INJECTION, POWDER, FOR SOLUTION INTRAMUSCULAR; INTRAVENOUS at 11:59

## 2023-09-18 RX ADMIN — SODIUM CHLORIDE 1 GRAM(S): 9 INJECTION INTRAMUSCULAR; INTRAVENOUS; SUBCUTANEOUS at 17:40

## 2023-09-18 RX ADMIN — Medication 250 MILLIGRAM(S): at 06:22

## 2023-09-18 RX ADMIN — SODIUM CHLORIDE 1 GRAM(S): 9 INJECTION INTRAMUSCULAR; INTRAVENOUS; SUBCUTANEOUS at 09:39

## 2023-09-18 RX ADMIN — MIRTAZAPINE 15 MILLIGRAM(S): 45 TABLET, ORALLY DISINTEGRATING ORAL at 11:59

## 2023-09-18 RX ADMIN — ATORVASTATIN CALCIUM 80 MILLIGRAM(S): 80 TABLET, FILM COATED ORAL at 21:17

## 2023-09-18 RX ADMIN — Medication 40 MILLIEQUIVALENT(S): at 17:41

## 2023-09-18 RX ADMIN — Medication 25 GRAM(S): at 07:52

## 2023-09-18 RX ADMIN — CHLORHEXIDINE GLUCONATE 15 MILLILITER(S): 213 SOLUTION TOPICAL at 06:22

## 2023-09-18 RX ADMIN — Medication 650 MILLIGRAM(S): at 10:14

## 2023-09-18 RX ADMIN — Medication 250 MILLIGRAM(S): at 21:17

## 2023-09-18 RX ADMIN — SENNA PLUS 2 TABLET(S): 8.6 TABLET ORAL at 21:17

## 2023-09-18 RX ADMIN — ENOXAPARIN SODIUM 40 MILLIGRAM(S): 100 INJECTION SUBCUTANEOUS at 09:39

## 2023-09-18 RX ADMIN — FAMOTIDINE 20 MILLIGRAM(S): 10 INJECTION INTRAVENOUS at 17:41

## 2023-09-18 RX ADMIN — Medication 100 MICROGRAM(S): at 06:23

## 2023-09-18 RX ADMIN — Medication 40 MILLIEQUIVALENT(S): at 09:39

## 2023-09-18 RX ADMIN — Medication 650 MILLIGRAM(S): at 22:34

## 2023-09-18 RX ADMIN — CEFEPIME 100 MILLIGRAM(S): 1 INJECTION, POWDER, FOR SOLUTION INTRAMUSCULAR; INTRAVENOUS at 20:20

## 2023-09-18 RX ADMIN — FAMOTIDINE 20 MILLIGRAM(S): 10 INJECTION INTRAVENOUS at 09:39

## 2023-09-18 RX ADMIN — Medication 1 APPLICATION(S): at 17:40

## 2023-09-18 RX ADMIN — Medication 250 MILLIGRAM(S): at 14:53

## 2023-09-18 RX ADMIN — HEPARIN SODIUM 5000 UNIT(S): 5000 INJECTION INTRAVENOUS; SUBCUTANEOUS at 06:23

## 2023-09-18 RX ADMIN — Medication 650 MILLIGRAM(S): at 21:07

## 2023-09-18 RX ADMIN — LATANOPROST 1 DROP(S): 0.05 SOLUTION/ DROPS OPHTHALMIC; TOPICAL at 22:03

## 2023-09-18 RX ADMIN — Medication 1 APPLICATION(S): at 06:22

## 2023-09-18 RX ADMIN — CHLORHEXIDINE GLUCONATE 15 MILLILITER(S): 213 SOLUTION TOPICAL at 17:40

## 2023-09-18 RX ADMIN — Medication 40 MILLIEQUIVALENT(S): at 14:54

## 2023-09-18 RX ADMIN — Medication 650 MILLIGRAM(S): at 10:44

## 2023-09-18 RX ADMIN — CHLORHEXIDINE GLUCONATE 1 APPLICATION(S): 213 SOLUTION TOPICAL at 06:21

## 2023-09-18 RX ADMIN — CEFEPIME 100 MILLIGRAM(S): 1 INJECTION, POWDER, FOR SOLUTION INTRAMUSCULAR; INTRAVENOUS at 04:19

## 2023-09-18 RX ADMIN — Medication 25 GRAM(S): at 14:53

## 2023-09-18 RX ADMIN — Medication 300 MILLIGRAM(S): at 11:59

## 2023-09-18 NOTE — PROGRESS NOTE ADULT - ASSESSMENT
Assessment  GEOVANY ROMERO is a 78y woman with a medical history significant for Severe COVID, seizuire disorder, pancytopenia, prior Candida auris infection who presented initially with hypotension, fever, and tachycardia, and is now in the critical care unit for septic shock due to pneumonia.       Plan  # Septic shock due to pneumonia and UTI   # Type II NSTEMI  # Hx of COPD   # Hx of acute hypoxemic respiratory failure S/P tracheostomy   # Hx of staphylococcus bacteremia, candida auris   # Hx of PEG due to oropharyngeal dysphagia   # Hypothyroidism  # Chronic Hyponatremia   # Pancytopenia, anemia  # Glaucoma  # GERD  # Seizure disorder  # Hx of CVA   # Hyperlipidemia       CNS:  Sedation: no sedation  Continue AEDs  Unable to obtain medication from [facility]. Pharmacy has ordered the medication and the pt will receive the medication tomorrow morning.  Otherwise, neuro status at baseline    HEENT:  Oral care  tracheostomy care    CVS  wean levophed as tolerated to maintain MAP > 65  IVF boluses as tolerated for hypotension    PULMONARY  HOB @ 45 degrees, aspiration precautions  Decreased PEEP to 8cm and will increase FiO2 to maintain spo2 >92%    GASTROINTESTINAL  GI prophylaxis: Pepcid BID  C/w tube feedings but consulted dietary for modifications as [facility] tube formula unavailable at the hospital  BM: on senna 2 tabs through PEG tube    GENITOURINARY/RENAL  mccauley in situ, trial of void tomorrow, I/O monitoring    INFECTIOUS  F/U blood and UCX  Continue empiric broad spectrum Abx: Cefepime & Levofloxacin  MRSA nares (-) hence vanc discontinued    HEMATOLOGIC  DVT PPX lovenox SQ    ENDOCRINE  Follow up FS.  Insulin protocol as needed.  BG goal 140-180  Continue PTA levothyroxine    MSK/DERM  bedrest    F/U mag level, F/U serum valproate, F/U nutrition eval Assessment  GEOVANY ROMERO is a 78y woman with a medical history significant for Severe COVID, seizuire disorder, pancytopenia, prior Candida auris infection who presented initially with hypotension, fever, and tachycardia, and is now in the critical care unit for septic shock due to pneumonia.       Plan  # Septic shock due to pneumonia and UTI   # Type II NSTEMI  # Hx of COPD   # Hx of acute hypoxemic respiratory failure S/P tracheostomy   # Hx of staphylococcus bacteremia, candida auris   # Hx of PEG due to oropharyngeal dysphagia   # Hypothyroidism  # Chronic Hyponatremia   # Pancytopenia, anemia  # Glaucoma  # GERD  # Seizure disorder  # Hx of CVA   # Hyperlipidemia       CNS:  Sedation: no sedation  Continue AEDs  Unable to obtain medication from [facility]. Pharmacy has ordered the medication and the pt will receive the medication tomorrow morning.  Otherwise, neuro status at baseline    HEENT:  Oral care  tracheostomy care    CVS  wean levophed as tolerated to maintain MAP > 65  IVF boluses as tolerated for hypotension    PULMONARY  HOB @ 45 degrees, aspiration precautions  Decreased PEEP to 8cm and will increase FiO2 to maintain spo2 >92%    GASTROINTESTINAL  GI prophylaxis: Pepcid BID  C/w tube feedings but consulted dietary for modifications as [facility] tube formula unavailable at the hospital  BM: on senna 2 tabs through PEG tube    GENITOURINARY/RENAL  mccauley in situ, trial of void tomorrow, I/O monitoring    INFECTIOUS  F/U blood and UCX  Continue empiric broad spectrum Abx: Cefepime & Levofloxacin  MRSA nares (-) hence vanc discontinued    HEMATOLOGIC  DVT PPX lovenox SQ  Duplex shows no signs of DVT even though D dimer is raised    ENDOCRINE  Follow up FS.  Insulin protocol as needed.  BG goal 140-180  Continue PTA levothyroxine    MSK/DERM  bedrest    F/U mag level, F/U serum valproate, F/U nutrition eval

## 2023-09-18 NOTE — PROGRESS NOTE ADULT - SUBJECTIVE AND OBJECTIVE BOX
24H events:    Patient is a 78y old Female who presents with a chief complaint of Hypotension (18 Sep 2023 09:14)    Primary diagnosis of Sepsis       Today is hospital day 1d.      PAST MEDICAL & SURGICAL HISTORY  CVA (cerebral vascular accident)    HLD (hyperlipidemia)    COPD (chronic obstructive pulmonary disease)    Pseudoseizure    Vertigo    Schizoaffective disorder, depressive type    Anemia    Glaucoma, unspecified glaucoma type, unspecified laterality    Bipolar disorder    History of inguinal herniorrhaphy    History of ankle surgery    H/O:       SOCIAL HISTORY:  Negative for smoking/alcohol/drug use.     ALLERGIES:  Tegretol (Unknown)  penicillin (Unknown)  Keppra (Unknown)  Dilantin (Unknown)  phenobarbital (Unknown)    MEDICATIONS:  STANDING MEDICATIONS  albuterol/ipratropium for Nebulization 3 milliLiter(s) Nebulizer every 6 hours  atorvastatin 80 milliGRAM(s) Oral at bedtime  bacitracin   Ointment 1 Application(s) Topical two times a day  brivaracetam Oral Solution 50 milliGRAM(s) Oral two times a day  cefepime   IVPB 2000 milliGRAM(s) IV Intermittent every 8 hours  chlorhexidine 0.12% Liquid 15 milliLiter(s) Oral Mucosa two times a day  chlorhexidine 2% Cloths 1 Application(s) Topical <User Schedule>  enoxaparin Injectable 40 milliGRAM(s) SubCutaneous every 24 hours  famotidine    Tablet 20 milliGRAM(s) Oral two times a day  ferrous    sulfate Liquid 300 milliGRAM(s) Enteral Tube daily  latanoprost 0.005% Ophthalmic Solution 1 Drop(s) Both EYES at bedtime  levoFLOXacin IVPB 750 milliGRAM(s) IV Intermittent every 24 hours  levothyroxine 100 MICROGram(s) Oral daily  mirtazapine 15 milliGRAM(s) Oral daily  norepinephrine Infusion 0.05 MICROgram(s)/kG/Min IV Continuous <Continuous>  potassium chloride    Tablet ER 40 milliEquivalent(s) Oral every 4 hours  senna 2 Tablet(s) Oral at bedtime  sodium chloride 1 Gram(s) Oral two times a day  valproic  acid Syrup 250 milliGRAM(s) Oral three times a day    PRN MEDICATIONS  acetaminophen     Tablet .. 650 milliGRAM(s) Oral every 6 hours PRN    VITALS:   T(F): 100.2  HR: 84  BP: 96/48  RR: 25  SpO2: 99%    LABS:                        8.5    20.68 )-----------( 89       ( 18 Sep 2023 04:20 )             26.4         136  |  97<L>  |  17  ----------------------------<  95  3.5   |  30  |  0.6<L>    Ca    8.7      18 Sep 2023 04:20  Mg     1.2         TPro  5.3<L>  /  Alb  2.5<L>  /  TBili  0.5  /  DBili  x   /  AST  36  /  ALT  21  /  AlkPhos  148<H>      PT/INR - ( 17 Sep 2023 16:21 )   PT: 13.30 sec;   INR: 1.16 ratio         PTT - ( 17 Sep 2023 16:21 )  PTT:28.5 sec  Urinalysis Basic - ( 18 Sep 2023 04:20 )    Color: Dark Yellow / Appearance: Clear / SG: >1.030 / pH: x  Gluc: 95 mg/dL / Ketone: Trace mg/dL  / Bili: Small / Urobili: 0.2 mg/dL   Blood: x / Protein: 30 mg/dL / Nitrite: Negative   Leuk Esterase: Trace / RBC: 2 /HPF / WBC 36 /HPF   Sq Epi: x / Non Sq Epi: 2 /HPF / Bacteria: Negative /HPF      ABG - ( 18 Sep 2023 04:12 )  pH, Arterial: 7.46  pH, Blood: x     /  pCO2: 45    /  pO2: 61    / HCO3: 32    / Base Excess: 7.1   /  SaO2: 92.3              Troponin T, Serum: 0.03 ng/mL *HH* (23 @ 04:20)  Troponin T, Serum: 0.03 ng/mL *HH* (23 @ 20:39)  Lactate, Blood: 3.7 mmol/L *H* (23 @ 20:39)  Lactate, Blood: 4.7 mmol/L *HH* (23 @ 16:21)  Lactate, Blood: 6.3 mmol/L *HH* (23 @ 13:55)      CARDIAC MARKERS ( 18 Sep 2023 04:20 )  x     / 0.03 ng/mL / x     / x     / x      CARDIAC MARKERS ( 17 Sep 2023 20:39 )  x     / 0.03 ng/mL / x     / x     / x      CARDIAC MARKERS ( 17 Sep 2023 11:38 )  x     / 0.02 ng/mL / x     / x     / x            PHYSICAL EXAM:  GENERAL: NAD  NECK: Supple, No JVD, Normal thyroid  NERVOUS SYSTEM: Awake but tracheostomy present, Pt at baseline  CHEST/LUNG: Rt sided crackles and rhonchi present  HEART: Regular rate and rhythm; No murmurs, rubs, or gallops  ABDOMEN: Soft, Nontender, Nondistended  EXTREMITIES:  + Peripheral Pulses, No clubbing, cyanosis, or edema

## 2023-09-18 NOTE — PROGRESS NOTE ADULT - SUBJECTIVE AND OBJECTIVE BOX
Patient is a 78y old  Female who presents with a chief complaint of Hypotension (17 Sep 2023 16:46)        Over Night Events:    No acute events  Remains on levo  febrile overnight    ROS:     Unable to assess ROS: patient trached.        PHYSICAL EXAM    ICU Vital Signs Last 24 Hrs  T(C): 37.1 (18 Sep 2023 08:01), Max: 38.3 (17 Sep 2023 23:00)  T(F): 98.8 (18 Sep 2023 08:01), Max: 101 (17 Sep 2023 23:00)  HR: 90 (18 Sep 2023 09:03) (81 - 130)  BP: 147/65 (18 Sep 2023 08:01) (79/46 - 147/65)  BP(mean): 94 (18 Sep 2023 08:01) (52 - 94)  ABP: --  ABP(mean): --  RR: 36 (18 Sep 2023 08:01) (18 - 40)  SpO2: 98% (18 Sep 2023 09:03) (96% - 100%)    O2 Parameters below as of 18 Sep 2023 08:01  Patient On (Oxygen Delivery Method): ventilator    O2 Concentration (%): 40    Constitutional: no acute distress, well nourished well developed  Neuro: moving all 4 limbs spontaneously.  Sedated.  HEENT: NCAT, anicteric, trache in place  Neck: no visible lymphadenopathy or goiter  Pulm: synchronous with ventilator, coarse bilateral breath sounds anteriorly  Cardiac: extremities appear pink and well-perfused.  regular rhythm and rate, no murmur detected  Abdomen: non-distended  Extremities: trace dependent edema  Skin: no visible rashes or lesions      09-17-23 @ 07:01  -  09-18-23 @ 07:00  --------------------------------------------------------  IN:    IV PiggyBack: 350 mL    Jevity 1.2: 130 mL    Norepinephrine: 108.5 mL  Total IN: 588.5 mL    OUT:    Indwelling Catheter - Urethral (mL): 1365 mL  Total OUT: 1365 mL    Total NET: -776.5 mL      09-18-23 @ 07:01  -  09-18-23 @ 09:15  --------------------------------------------------------  IN:    IV PiggyBack: 50 mL    Jevity 1.2: 90 mL    Norepinephrine: 13 mL  Total IN: 153 mL    OUT:    Indwelling Catheter - Urethral (mL): 15 mL  Total OUT: 15 mL    Total NET: 138 mL          LABS:                            8.5    20.68 )-----------( 89       ( 18 Sep 2023 04:20 )             26.4                                               09-18    136  |  97<L>  |  17  ----------------------------<  95  3.5   |  30  |  0.6<L>    Ca    8.7      18 Sep 2023 04:20  Mg     1.2     09-18    TPro  5.3<L>  /  Alb  2.5<L>  /  TBili  0.5  /  DBili  x   /  AST  36  /  ALT  21  /  AlkPhos  148<H>  09-18      PT/INR - ( 17 Sep 2023 16:21 )   PT: 13.30 sec;   INR: 1.16 ratio         PTT - ( 17 Sep 2023 16:21 )  PTT:28.5 sec                                       Urinalysis Basic - ( 18 Sep 2023 04:20 )    Color: Dark Yellow / Appearance: Clear / SG: >1.030 / pH: x  Gluc: 95 mg/dL / Ketone: Trace mg/dL  / Bili: Small / Urobili: 0.2 mg/dL   Blood: x / Protein: 30 mg/dL / Nitrite: Negative   Leuk Esterase: Trace / RBC: 2 /HPF / WBC 36 /HPF   Sq Epi: x / Non Sq Epi: 2 /HPF / Bacteria: Negative /HPF        CARDIAC MARKERS ( 18 Sep 2023 04:20 )  x     / 0.03 ng/mL / x     / x     / x      CARDIAC MARKERS ( 17 Sep 2023 20:39 )  x     / 0.03 ng/mL / x     / x     / x      CARDIAC MARKERS ( 17 Sep 2023 11:38 )  x     / 0.02 ng/mL / x     / x     / x                                                LIVER FUNCTIONS - ( 18 Sep 2023 04:20 )  Alb: 2.5 g/dL / Pro: 5.3 g/dL / ALK PHOS: 148 U/L / ALT: 21 U/L / AST: 36 U/L / GGT: x                                                                                               Mode: AC/ CMV (Assist Control/ Continuous Mandatory Ventilation)  RR (machine): 20  TV (machine): 400  FiO2: 40  PEEP: 10  ITime: 0.8  MAP: 16  PIP: 29                                      ABG - ( 18 Sep 2023 04:12 )  pH, Arterial: 7.46  pH, Blood: x     /  pCO2: 45    /  pO2: 61    / HCO3: 32    / Base Excess: 7.1   /  SaO2: 92.3                MEDICATIONS  (STANDING):  albuterol/ipratropium for Nebulization 3 milliLiter(s) Nebulizer every 6 hours  atorvastatin 80 milliGRAM(s) Oral at bedtime  bacitracin   Ointment 1 Application(s) Topical two times a day  brivaracetam Oral Solution 50 milliGRAM(s) Oral two times a day  cefepime   IVPB 2000 milliGRAM(s) IV Intermittent every 8 hours  chlorhexidine 0.12% Liquid 15 milliLiter(s) Oral Mucosa two times a day  chlorhexidine 2% Cloths 1 Application(s) Topical <User Schedule>  famotidine    Tablet 20 milliGRAM(s) Oral two times a day  ferrous    sulfate Liquid 300 milliGRAM(s) Enteral Tube daily  heparin   Injectable 5000 Unit(s) SubCutaneous every 8 hours  latanoprost 0.005% Ophthalmic Solution 1 Drop(s) Both EYES at bedtime  levoFLOXacin IVPB 750 milliGRAM(s) IV Intermittent every 24 hours  levothyroxine 100 MICROGram(s) Oral daily  mirtazapine 15 milliGRAM(s) Oral daily  norepinephrine Infusion 0.05 MICROgram(s)/kG/Min (3.57 mL/Hr) IV Continuous <Continuous>  potassium chloride    Tablet ER 40 milliEquivalent(s) Oral every 4 hours  senna 2 Tablet(s) Oral at bedtime  sodium chloride 1 Gram(s) Oral two times a day  valproic  acid Syrup 250 milliGRAM(s) Oral three times a day  vancomycin  IVPB 1000 milliGRAM(s) IV Intermittent every 12 hours    MEDICATIONS  (PRN):  acetaminophen     Tablet .. 650 milliGRAM(s) Oral every 6 hours PRN Temp greater or equal to 38C (100.4F), Moderate Pain (4 - 6)      New X-rays reviewed:       ECHO reviewed    CXR interpreted by me:    9/18  images and radiologist read reviewed, by my read demonstrating RLL infiltrate, stable from prior

## 2023-09-19 LAB
ACANTHOCYTES BLD QL SMEAR: SLIGHT — SIGNIFICANT CHANGE UP
ALBUMIN SERPL ELPH-MCNC: 2.2 G/DL — LOW (ref 3.5–5.2)
ALP SERPL-CCNC: 153 U/L — HIGH (ref 30–115)
ALT FLD-CCNC: 21 U/L — SIGNIFICANT CHANGE UP (ref 0–41)
ANION GAP SERPL CALC-SCNC: 10 MMOL/L — SIGNIFICANT CHANGE UP (ref 7–14)
ANISOCYTOSIS BLD QL: SLIGHT — SIGNIFICANT CHANGE UP
AST SERPL-CCNC: 34 U/L — SIGNIFICANT CHANGE UP (ref 0–41)
BASE EXCESS BLDA CALC-SCNC: 4.9 MMOL/L — HIGH (ref -2–3)
BASOPHILS # BLD AUTO: 0 K/UL — SIGNIFICANT CHANGE UP (ref 0–0.2)
BASOPHILS # BLD AUTO: 0.02 K/UL — SIGNIFICANT CHANGE UP (ref 0–0.2)
BASOPHILS NFR BLD AUTO: 0 % — SIGNIFICANT CHANGE UP (ref 0–1)
BASOPHILS NFR BLD AUTO: 0.2 % — SIGNIFICANT CHANGE UP (ref 0–1)
BILIRUB SERPL-MCNC: 0.3 MG/DL — SIGNIFICANT CHANGE UP (ref 0.2–1.2)
BUN SERPL-MCNC: 15 MG/DL — SIGNIFICANT CHANGE UP (ref 10–20)
BURR CELLS BLD QL SMEAR: PRESENT — SIGNIFICANT CHANGE UP
CALCIUM SERPL-MCNC: 8 MG/DL — LOW (ref 8.4–10.5)
CHLORIDE SERPL-SCNC: 99 MMOL/L — SIGNIFICANT CHANGE UP (ref 98–110)
CO2 SERPL-SCNC: 25 MMOL/L — SIGNIFICANT CHANGE UP (ref 17–32)
CREAT SERPL-MCNC: 0.6 MG/DL — LOW (ref 0.7–1.5)
DACRYOCYTES BLD QL SMEAR: SLIGHT — SIGNIFICANT CHANGE UP
EGFR: 91 ML/MIN/1.73M2 — SIGNIFICANT CHANGE UP
EOSINOPHIL # BLD AUTO: 0.46 K/UL — SIGNIFICANT CHANGE UP (ref 0–0.7)
EOSINOPHIL # BLD AUTO: 0.5 K/UL — SIGNIFICANT CHANGE UP (ref 0–0.7)
EOSINOPHIL NFR BLD AUTO: 4.6 % — SIGNIFICANT CHANGE UP (ref 0–8)
EOSINOPHIL NFR BLD AUTO: 5.1 % — SIGNIFICANT CHANGE UP (ref 0–8)
GLUCOSE SERPL-MCNC: 146 MG/DL — HIGH (ref 70–99)
GRAM STN FLD: SIGNIFICANT CHANGE UP
HCO3 BLDA-SCNC: 29 MMOL/L — HIGH (ref 21–28)
HCT VFR BLD CALC: 24.4 % — LOW (ref 37–47)
HCT VFR BLD CALC: 24.9 % — LOW (ref 37–47)
HGB BLD-MCNC: 7.8 G/DL — LOW (ref 12–16)
HGB BLD-MCNC: 8.1 G/DL — LOW (ref 12–16)
HOROWITZ INDEX BLDA+IHG-RTO: 40 — SIGNIFICANT CHANGE UP
IMM GRANULOCYTES NFR BLD AUTO: 4.9 % — HIGH (ref 0.1–0.3)
LYMPHOCYTES # BLD AUTO: 1.07 K/UL — LOW (ref 1.2–3.4)
LYMPHOCYTES # BLD AUTO: 1.42 K/UL — SIGNIFICANT CHANGE UP (ref 1.2–3.4)
LYMPHOCYTES # BLD AUTO: 11.8 % — LOW (ref 20.5–51.1)
LYMPHOCYTES # BLD AUTO: 13.2 % — LOW (ref 20.5–51.1)
MACROCYTES BLD QL: SLIGHT — SIGNIFICANT CHANGE UP
MAGNESIUM SERPL-MCNC: 1.7 MG/DL — LOW (ref 1.8–2.4)
MANUAL SMEAR VERIFICATION: SIGNIFICANT CHANGE UP
MCHC RBC-ENTMCNC: 31.3 PG — HIGH (ref 27–31)
MCHC RBC-ENTMCNC: 31.5 PG — HIGH (ref 27–31)
MCHC RBC-ENTMCNC: 32 G/DL — SIGNIFICANT CHANGE UP (ref 32–37)
MCHC RBC-ENTMCNC: 32.5 G/DL — SIGNIFICANT CHANGE UP (ref 32–37)
MCV RBC AUTO: 96.1 FL — SIGNIFICANT CHANGE UP (ref 81–99)
MCV RBC AUTO: 98.4 FL — SIGNIFICANT CHANGE UP (ref 81–99)
MONOCYTES # BLD AUTO: 0.53 K/UL — SIGNIFICANT CHANGE UP (ref 0.1–0.6)
MONOCYTES # BLD AUTO: 1.12 K/UL — HIGH (ref 0.1–0.6)
MONOCYTES NFR BLD AUTO: 10.4 % — HIGH (ref 1.7–9.3)
MONOCYTES NFR BLD AUTO: 5.9 % — SIGNIFICANT CHANGE UP (ref 1.7–9.3)
MYELOCYTES NFR BLD: 0.8 % — HIGH (ref 0–0)
NEUTROPHILS # BLD AUTO: 6.7 K/UL — HIGH (ref 1.4–6.5)
NEUTROPHILS # BLD AUTO: 7.17 K/UL — HIGH (ref 1.4–6.5)
NEUTROPHILS NFR BLD AUTO: 66.7 % — SIGNIFICANT CHANGE UP (ref 42.2–75.2)
NEUTROPHILS NFR BLD AUTO: 67.2 % — SIGNIFICANT CHANGE UP (ref 42.2–75.2)
NEUTS BAND # BLD: 6.7 % — HIGH (ref 0–6)
NRBC # BLD: 0 /100 WBCS — SIGNIFICANT CHANGE UP (ref 0–0)
NRBC # BLD: 1 /100 — HIGH (ref 0–0)
NRBC # BLD: SIGNIFICANT CHANGE UP /100 WBCS (ref 0–0)
PCO2 BLDA: 41 MMHG — SIGNIFICANT CHANGE UP (ref 25–48)
PH BLDA: 7.46 — HIGH (ref 7.35–7.45)
PLAT MORPH BLD: ABNORMAL
PLATELET # BLD AUTO: 101 K/UL — LOW (ref 130–400)
PLATELET # BLD AUTO: 89 K/UL — LOW (ref 130–400)
PMV BLD: 9.1 FL — SIGNIFICANT CHANGE UP (ref 7.4–10.4)
PMV BLD: 9.2 FL — SIGNIFICANT CHANGE UP (ref 7.4–10.4)
PO2 BLDA: 160 MMHG — HIGH (ref 83–108)
POIKILOCYTOSIS BLD QL AUTO: SLIGHT — SIGNIFICANT CHANGE UP
POLYCHROMASIA BLD QL SMEAR: SLIGHT — SIGNIFICANT CHANGE UP
POTASSIUM SERPL-MCNC: 4.6 MMOL/L — SIGNIFICANT CHANGE UP (ref 3.5–5)
POTASSIUM SERPL-SCNC: 4.6 MMOL/L — SIGNIFICANT CHANGE UP (ref 3.5–5)
PROT SERPL-MCNC: 5.3 G/DL — LOW (ref 6–8)
RBC # BLD: 2.48 M/UL — LOW (ref 4.2–5.4)
RBC # BLD: 2.59 M/UL — LOW (ref 4.2–5.4)
RBC # FLD: 17.1 % — HIGH (ref 11.5–14.5)
RBC # FLD: 17.1 % — HIGH (ref 11.5–14.5)
RBC BLD AUTO: ABNORMAL
SAO2 % BLDA: 98.5 % — HIGH (ref 94–98)
SMUDGE CELLS # BLD: PRESENT — SIGNIFICANT CHANGE UP
SODIUM SERPL-SCNC: 134 MMOL/L — LOW (ref 135–146)
SPECIMEN SOURCE: SIGNIFICANT CHANGE UP
VALPROATE SERPL-MCNC: 43 UG/ML — LOW (ref 50–100)
VARIANT LYMPHS # BLD: 2.5 % — SIGNIFICANT CHANGE UP (ref 0–5)
WBC # BLD: 10.76 K/UL — SIGNIFICANT CHANGE UP (ref 4.8–10.8)
WBC # BLD: 9.06 K/UL — SIGNIFICANT CHANGE UP (ref 4.8–10.8)
WBC # FLD AUTO: 10.76 K/UL — SIGNIFICANT CHANGE UP (ref 4.8–10.8)
WBC # FLD AUTO: 9.06 K/UL — SIGNIFICANT CHANGE UP (ref 4.8–10.8)

## 2023-09-19 PROCEDURE — 99291 CRITICAL CARE FIRST HOUR: CPT

## 2023-09-19 PROCEDURE — 71045 X-RAY EXAM CHEST 1 VIEW: CPT | Mod: 26

## 2023-09-19 RX ORDER — FUROSEMIDE 40 MG
20 TABLET ORAL DAILY
Refills: 0 | Status: DISCONTINUED | OUTPATIENT
Start: 2023-09-19 | End: 2023-09-26

## 2023-09-19 RX ORDER — MAGNESIUM OXIDE 400 MG ORAL TABLET 241.3 MG
400 TABLET ORAL
Refills: 0 | Status: COMPLETED | OUTPATIENT
Start: 2023-09-19 | End: 2023-09-20

## 2023-09-19 RX ORDER — NYSTATIN CREAM 100000 [USP'U]/G
1 CREAM TOPICAL ONCE
Refills: 0 | Status: COMPLETED | OUTPATIENT
Start: 2023-09-19 | End: 2023-09-20

## 2023-09-19 RX ADMIN — Medication 250 MILLIGRAM(S): at 13:52

## 2023-09-19 RX ADMIN — ENOXAPARIN SODIUM 40 MILLIGRAM(S): 100 INJECTION SUBCUTANEOUS at 09:48

## 2023-09-19 RX ADMIN — MAGNESIUM OXIDE 400 MG ORAL TABLET 400 MILLIGRAM(S): 241.3 TABLET ORAL at 17:21

## 2023-09-19 RX ADMIN — Medication 250 MILLIGRAM(S): at 05:50

## 2023-09-19 RX ADMIN — Medication 100 MICROGRAM(S): at 05:50

## 2023-09-19 RX ADMIN — Medication 650 MILLIGRAM(S): at 13:57

## 2023-09-19 RX ADMIN — Medication 250 MILLIGRAM(S): at 21:15

## 2023-09-19 RX ADMIN — CHLORHEXIDINE GLUCONATE 15 MILLILITER(S): 213 SOLUTION TOPICAL at 17:22

## 2023-09-19 RX ADMIN — FAMOTIDINE 20 MILLIGRAM(S): 10 INJECTION INTRAVENOUS at 05:50

## 2023-09-19 RX ADMIN — CHLORHEXIDINE GLUCONATE 15 MILLILITER(S): 213 SOLUTION TOPICAL at 05:49

## 2023-09-19 RX ADMIN — Medication 20 MILLIGRAM(S): at 09:48

## 2023-09-19 RX ADMIN — CEFEPIME 100 MILLIGRAM(S): 1 INJECTION, POWDER, FOR SOLUTION INTRAMUSCULAR; INTRAVENOUS at 11:34

## 2023-09-19 RX ADMIN — LATANOPROST 1 DROP(S): 0.05 SOLUTION/ DROPS OPHTHALMIC; TOPICAL at 21:15

## 2023-09-19 RX ADMIN — Medication 1 APPLICATION(S): at 17:20

## 2023-09-19 RX ADMIN — SODIUM CHLORIDE 1 GRAM(S): 9 INJECTION INTRAMUSCULAR; INTRAVENOUS; SUBCUTANEOUS at 17:21

## 2023-09-19 RX ADMIN — Medication 650 MILLIGRAM(S): at 14:00

## 2023-09-19 RX ADMIN — CEFEPIME 100 MILLIGRAM(S): 1 INJECTION, POWDER, FOR SOLUTION INTRAMUSCULAR; INTRAVENOUS at 21:14

## 2023-09-19 RX ADMIN — Medication 1 APPLICATION(S): at 05:49

## 2023-09-19 RX ADMIN — Medication 300 MILLIGRAM(S): at 11:35

## 2023-09-19 RX ADMIN — SODIUM CHLORIDE 1 GRAM(S): 9 INJECTION INTRAMUSCULAR; INTRAVENOUS; SUBCUTANEOUS at 05:50

## 2023-09-19 RX ADMIN — CHLORHEXIDINE GLUCONATE 1 APPLICATION(S): 213 SOLUTION TOPICAL at 06:07

## 2023-09-19 RX ADMIN — CEFEPIME 100 MILLIGRAM(S): 1 INJECTION, POWDER, FOR SOLUTION INTRAMUSCULAR; INTRAVENOUS at 04:25

## 2023-09-19 RX ADMIN — MIRTAZAPINE 15 MILLIGRAM(S): 45 TABLET, ORALLY DISINTEGRATING ORAL at 11:35

## 2023-09-19 RX ADMIN — FAMOTIDINE 20 MILLIGRAM(S): 10 INJECTION INTRAVENOUS at 17:20

## 2023-09-19 RX ADMIN — ATORVASTATIN CALCIUM 80 MILLIGRAM(S): 80 TABLET, FILM COATED ORAL at 21:15

## 2023-09-19 RX ADMIN — BRIVARACETAM 50 MILLIGRAM(S): 25 TABLET, FILM COATED ORAL at 17:20

## 2023-09-19 NOTE — PROGRESS NOTE ADULT - SUBJECTIVE AND OBJECTIVE BOX
Patient is a 78y old  Female who presents with a chief complaint of Hypotension (18 Sep 2023 12:37)        Over Night Events: Remains on MV. On levophed 0.13.      ROS:     All pertinent ROS are negative except HPI         PHYSICAL EXAM    ICU Vital Signs Last 24 Hrs  T(C): 36.4 (19 Sep 2023 04:00), Max: 38.6 (18 Sep 2023 10:00)  T(F): 97.6 (19 Sep 2023 04:00), Max: 101.5 (18 Sep 2023 10:00)  HR: 79 (19 Sep 2023 06:00) (66 - 98)  BP: 150/66 (19 Sep 2023 06:00) (96/48 - 165/67)  BP(mean): 95 (19 Sep 2023 06:00) (69 - 97)  RR: 27 (19 Sep 2023 06:00) (20 - 36)  SpO2: 100% (19 Sep 2023 06:00) (95% - 100%)    O2 Parameters below as of 18 Sep 2023 19:00  Patient On (Oxygen Delivery Method): ventilator    O2 Concentration (%): 40        CONSTITUTIONAL:  NAD    ENT:   Trach    EYES:   Pupils equal    CARDIAC:   Normal rate,   Regular rhythm.      RESPIRATORY:   No wheezing  Bilateral BS  Normal chest expansion  Not tachypneic,  No use of accessory muscles    GASTROINTESTINAL:  Abdomen soft,   Non-tender,   No guarding,   + BS    MUSCULOSKELETAL:   Range of motion is not limited,  No clubbing, cyanosis    NEUROLOGICAL:   At baseline    PSYCHIATRIC:   No apparent risk to self or others.          09-18-23 @ 07:01  -  09-19-23 @ 07:00  --------------------------------------------------------  IN:    Enteral Tube Flush: 810 mL    Free Water: 400 mL    IV PiggyBack: 500 mL    Jevity 1.2: 1190 mL    Norepinephrine: 251.9 mL  Total IN: 3151.9 mL    OUT:    Indwelling Catheter - Urethral (mL): 980 mL  Total OUT: 980 mL    Total NET: 2171.9 mL          LABS:                            8.5    20.68 )-----------( 89       ( 18 Sep 2023 04:20 )             26.4                                               09-18    136  |  97<L>  |  17  ----------------------------<  95  3.5   |  30  |  0.6<L>    Ca    8.7      18 Sep 2023 04:20  Mg     1.2     09-18    TPro  5.3<L>  /  Alb  2.5<L>  /  TBili  0.5  /  DBili  x   /  AST  36  /  ALT  21  /  AlkPhos  148<H>  09-18      PT/INR - ( 17 Sep 2023 16:21 )   PT: 13.30 sec;   INR: 1.16 ratio         PTT - ( 17 Sep 2023 16:21 )  PTT:28.5 sec                                       Urinalysis Basic - ( 18 Sep 2023 04:20 )    Color: Dark Yellow / Appearance: Clear / SG: >1.030 / pH: x  Gluc: 95 mg/dL / Ketone: Trace mg/dL  / Bili: Small / Urobili: 0.2 mg/dL   Blood: x / Protein: 30 mg/dL / Nitrite: Negative   Leuk Esterase: Trace / RBC: 2 /HPF / WBC 36 /HPF   Sq Epi: x / Non Sq Epi: 2 /HPF / Bacteria: Negative /HPF        CARDIAC MARKERS ( 18 Sep 2023 04:20 )  x     / 0.03 ng/mL / x     / x     / x      CARDIAC MARKERS ( 17 Sep 2023 20:39 )  x     / 0.03 ng/mL / x     / x     / x      CARDIAC MARKERS ( 17 Sep 2023 11:38 )  x     / 0.02 ng/mL / x     / x     / x                                                LIVER FUNCTIONS - ( 18 Sep 2023 04:20 )  Alb: 2.5 g/dL / Pro: 5.3 g/dL / ALK PHOS: 148 U/L / ALT: 21 U/L / AST: 36 U/L / GGT: x                                                  Culture - Sputum (collected 18 Sep 2023 12:20)  Source: Trach Asp Tracheal Aspirate  Gram Stain (19 Sep 2023 02:35):    Moderate polymorphonuclear leukocytes per low power field    Rare Squamous epithelial cells per low power field    Moderate Gram Negative Rods per oil power field    Few Gram Positive Rods per oil power field    Culture - Blood (collected 17 Sep 2023 11:38)  Source: .Blood Blood-Peripheral  Preliminary Report (18 Sep 2023 20:02):    No growth at 24 hours    Culture - Blood (collected 17 Sep 2023 11:38)  Source: .Blood Blood-Peripheral  Preliminary Report (18 Sep 2023 20:02):    No growth at 24 hours                                                   Mode: AC/ CMV (Assist Control/ Continuous Mandatory Ventilation)  RR (machine): 20  TV (machine): 400  FiO2: 40  PEEP: 8  ITime: 1  MAP: 11  PIP: 23                                      ABG - ( 19 Sep 2023 03:33 )  pH, Arterial: 7.46  pH, Blood: x     /  pCO2: 41    /  pO2: 160   / HCO3: 29    / Base Excess: 4.9   /  SaO2: 98.5                MEDICATIONS  (STANDING):  albuterol/ipratropium for Nebulization 3 milliLiter(s) Nebulizer every 6 hours  atorvastatin 80 milliGRAM(s) Oral at bedtime  bacitracin   Ointment 1 Application(s) Topical two times a day  brivaracetam Oral Solution 50 milliGRAM(s) Oral two times a day  cefepime   IVPB 2000 milliGRAM(s) IV Intermittent every 8 hours  chlorhexidine 0.12% Liquid 15 milliLiter(s) Oral Mucosa two times a day  chlorhexidine 2% Cloths 1 Application(s) Topical <User Schedule>  enoxaparin Injectable 40 milliGRAM(s) SubCutaneous every 24 hours  famotidine    Tablet 20 milliGRAM(s) Oral two times a day  ferrous    sulfate Liquid 300 milliGRAM(s) Enteral Tube daily  latanoprost 0.005% Ophthalmic Solution 1 Drop(s) Both EYES at bedtime  levoFLOXacin IVPB 750 milliGRAM(s) IV Intermittent every 24 hours  levothyroxine 100 MICROGram(s) Oral daily  mirtazapine 15 milliGRAM(s) Oral daily  norepinephrine Infusion 0.05 MICROgram(s)/kG/Min (3.57 mL/Hr) IV Continuous <Continuous>  senna 2 Tablet(s) Oral at bedtime  sodium chloride 1 Gram(s) Oral two times a day  valproic  acid Syrup 250 milliGRAM(s) Oral three times a day    MEDICATIONS  (PRN):  acetaminophen     Tablet .. 650 milliGRAM(s) Oral every 6 hours PRN Temp greater or equal to 38C (100.4F), Moderate Pain (4 - 6)      New X-rays reviewed:                                                                                  ECHO    CXR interpreted by me:

## 2023-09-19 NOTE — DIETITIAN INITIAL EVALUATION ADULT - NS FNS DIET ORDER
Diet, NPO with Tube Feed:   Tube Feeding Modality: Gastrostomy  Jevity 1.2 Anastacio  Continuous  Starting Tube Feed Rate {mL per Hour}: 20  Increase Tube Feed Rate by (mL): 10     Every hour  Until Goal Tube Feed Rate (mL per Hour): 50  Tube Feed Duration (in Hours): 24  Tube Feed Start Time: 00:15  Free Water Flush  Free Water Flush Instructions:  200 ml water flushes every 4 hours (50ml/hr)  Liquid Protein Supplement     Qty per Day:  1 (09-18-23 @ 00:07) [Active]

## 2023-09-19 NOTE — DIETITIAN INITIAL EVALUATION ADULT - OTHER CALCULATIONS
Using ABW: ENERGY: PSE 1571.36 (Ve 10.9; Tmax 36.5 C) vs. 3389-0605 kcal/day (15-20 kcal/kg); PROTEIN:  g/day (1.2-1.4 g/kg); FLUID: 1mL/kcal team to adjust prn -- with consideration for age, BMI, intubated, critical illness

## 2023-09-19 NOTE — DIETITIAN INITIAL EVALUATION ADULT - ORAL INTAKE PTA/DIET HISTORY
Pt unable to participate in nutrition assessment interview; intubated. Hx limited to medical chart at this time.

## 2023-09-19 NOTE — DIETITIAN INITIAL EVALUATION ADULT - NAME AND PHONE
Odalis [phone] or TEAMS    Nutrition Intervention: TF regimen; Nutrition Monitoring: Diet order, weights, labs, NFPF, body composition, BM and tolerance to TF regimen

## 2023-09-19 NOTE — PROGRESS NOTE ADULT - SUBJECTIVE AND OBJECTIVE BOX
24H events:    Patient is a 78y old Female who presents with a chief complaint of Sepsis     (19 Sep 2023 09:30)    Primary diagnosis of Sepsis       Today is hospital day 2d.      PAST MEDICAL & SURGICAL HISTORY  CVA (cerebral vascular accident)    HLD (hyperlipidemia)    COPD (chronic obstructive pulmonary disease)    Pseudoseizure    Vertigo    Schizoaffective disorder, depressive type    Anemia    Glaucoma, unspecified glaucoma type, unspecified laterality    Bipolar disorder    History of inguinal herniorrhaphy    History of ankle surgery    H/O:       SOCIAL HISTORY:  Negative for smoking/alcohol/drug use.     ALLERGIES:  Tegretol (Unknown)  penicillin (Unknown)  Keppra (Unknown)  Dilantin (Unknown)  phenobarbital (Unknown)    MEDICATIONS:  STANDING MEDICATIONS  albuterol/ipratropium for Nebulization 3 milliLiter(s) Nebulizer every 6 hours  atorvastatin 80 milliGRAM(s) Oral at bedtime  bacitracin   Ointment 1 Application(s) Topical two times a day  brivaracetam Oral Solution 50 milliGRAM(s) Oral two times a day  cefepime   IVPB 2000 milliGRAM(s) IV Intermittent every 8 hours  chlorhexidine 0.12% Liquid 15 milliLiter(s) Oral Mucosa two times a day  chlorhexidine 2% Cloths 1 Application(s) Topical <User Schedule>  enoxaparin Injectable 40 milliGRAM(s) SubCutaneous every 24 hours  famotidine    Tablet 20 milliGRAM(s) Oral two times a day  ferrous    sulfate Liquid 300 milliGRAM(s) Enteral Tube daily  furosemide    Tablet 20 milliGRAM(s) Oral daily  latanoprost 0.005% Ophthalmic Solution 1 Drop(s) Both EYES at bedtime  levoFLOXacin IVPB 750 milliGRAM(s) IV Intermittent every 24 hours  levothyroxine 100 MICROGram(s) Oral daily  mirtazapine 15 milliGRAM(s) Oral daily  norepinephrine Infusion 0.05 MICROgram(s)/kG/Min IV Continuous <Continuous>  senna 2 Tablet(s) Oral at bedtime  sodium chloride 1 Gram(s) Oral two times a day  valproic  acid Syrup 250 milliGRAM(s) Oral three times a day    PRN MEDICATIONS  acetaminophen     Tablet .. 650 milliGRAM(s) Oral every 6 hours PRN    VITALS:   T(F): 99.4  HR: 92  BP: 107/54  RR: 30  SpO2: 100%    LABS:                        8.5    20.68 )-----------( 89       ( 18 Sep 2023 04:20 )             26.4         136  |  97<L>  |  17  ----------------------------<  95  3.5   |  30  |  0.6<L>    Ca    8.7      18 Sep 2023 04:20  Mg     1.2         TPro  5.3<L>  /  Alb  2.5<L>  /  TBili  0.5  /  DBili  x   /  AST  36  /  ALT  21  /  AlkPhos  148<H>      PT/INR - ( 17 Sep 2023 16:21 )   PT: 13.30 sec;   INR: 1.16 ratio         PTT - ( 17 Sep 2023 16:21 )  PTT:28.5 sec  Urinalysis Basic - ( 18 Sep 2023 04:20 )    Color: Dark Yellow / Appearance: Clear / SG: >1.030 / pH: x  Gluc: 95 mg/dL / Ketone: Trace mg/dL  / Bili: Small / Urobili: 0.2 mg/dL   Blood: x / Protein: 30 mg/dL / Nitrite: Negative   Leuk Esterase: Trace / RBC: 2 /HPF / WBC 36 /HPF   Sq Epi: x / Non Sq Epi: 2 /HPF / Bacteria: Negative /HPF      ABG - ( 19 Sep 2023 03:33 )  pH, Arterial: 7.46  pH, Blood: x     /  pCO2: 41    /  pO2: 160   / HCO3: 29    / Base Excess: 4.9   /  SaO2: 98.5                  Culture - Sputum (collected 18 Sep 2023 12:20)  Source: Trach Asp Tracheal Aspirate  Gram Stain (19 Sep 2023 02:35):    Moderate polymorphonuclear leukocytes per low power field    Rare Squamous epithelial cells per low power field    Moderate Gram Negative Rods per oil power field    Few Gram Positive Rods per oil power field    Culture - Blood (collected 17 Sep 2023 11:38)  Source: .Blood Blood-Peripheral  Preliminary Report (18 Sep 2023 20:02):    No growth at 24 hours    Culture - Blood (collected 17 Sep 2023 11:38)  Source: .Blood Blood-Peripheral  Preliminary Report (18 Sep 2023 20:02):    No growth at 24 hours      CARDIAC MARKERS ( 18 Sep 2023 04:20 )  x     / 0.03 ng/mL / x     / x     / x      CARDIAC MARKERS ( 17 Sep 2023 20:39 )  x     / 0.03 ng/mL / x     / x     / x          RADIOLOGY:    PHYSICAL EXAM:  GENERAL: NAD  NECK: Supple, No JVD, Normal thyroid  NERVOUS SYSTEM:  Unable to assess, but as per nursing home pt is at baseline  CHEST/LUNG: Moderate wheezing and rhonchi on the right  HEART: Regular rate and rhythm; No murmurs, rubs, or gallops  ABDOMEN: Soft, Nontender, Nondistended; Bowel sounds present  EXTREMITIES:  + Peripheral Pulses, severe pitting edema present

## 2023-09-19 NOTE — DIETITIAN INITIAL EVALUATION ADULT - NSFNSGIIOFT_GEN_A_CORE
09-18-23 @ 07:01  -  09-19-23 @ 07:00  --------------------------------------------------------  OUT:  Total OUT: 0 mL    Total NET: 1190 mL

## 2023-09-19 NOTE — PROGRESS NOTE ADULT - ASSESSMENT
GEOVANY ROMERO is a 78y woman with a medical history significant for Severe COVID, seizuire disorder, pancytopenia, prior Candida auris infection who presented initially with hypotension, fever, and tachycardia, and is now in the critical care unit for septic shock due to pneumonia.     Impression    # Septic shock due to pneumonia and UTI   # Type II NSTEMI  # Hx of COPD   # Hx of acute hypoxemic respiratory failure S/P tracheostomy   # Hx of staphylococcus bacteremia, candida auris   # Hx of PEG due to oropharyngeal dysphagia   # Hypothyroidism  # Chronic Hyponatremia   # Pancytopenia, anemia  # Glaucoma  # GERD  # Seizure disorder  # Hx of CVA   # Hyperlipidemia     Plan:      CNS:  Sedation: no sedation  Continue AEDs, dose obtained from [facility]  Otherwise, neuro status at baseline    HEENT:  Oral care, trach care.    CARDIOVASCULAR  Vasopressors: levophed, wean as tolerated to maintain MAP > 65  Goal directed volume rescuctiation     PULMONARY  HOB @ 45 degrees, aspiration precautions  Target plateau pressure < 30, driving pressure <15  Titrate FiO2 as tolerated to maintain spO2 > 92%  Long term ventilated, no role for SBT    GASTROINTESTINAL  GI prophylaxis: Pepcid BID  Feeds: TF at goal  BM: regimen PRN    GENITOURINARY/RENAL  Davis: for acute retention, TOV today  Monitor I&O  No acute renal injury, monitor UOP and bolus IVF as needed    INFECTIOUS  Follow-up blood & UCx, and DTA  Continue empiric broad spectrum Abx: Cefepime & Levofloxacin  Discontinue Vanco, MRSA nares (-)  Repeat cultures PRN fever    HEMATOLOGIC  DVT ppx: lovenox SQ    ENDOCRINE  Follow up FS.  Insulin protocol as needed.  BG goal 140-180  Continue PTA levothyroxine    MSK/DERM  bedrest      CODE STATUS: FULL  DISPO: remain in MICU  LINES: R femoral TLC

## 2023-09-19 NOTE — DIETITIAN INITIAL EVALUATION ADULT - PERTINENT MEDS FT
MEDICATIONS  (STANDING):  albuterol/ipratropium for Nebulization 3 milliLiter(s) Nebulizer every 6 hours  atorvastatin 80 milliGRAM(s) Oral at bedtime  bacitracin   Ointment 1 Application(s) Topical two times a day  brivaracetam Oral Solution 50 milliGRAM(s) Oral two times a day  cefepime   IVPB 2000 milliGRAM(s) IV Intermittent every 8 hours  chlorhexidine 0.12% Liquid 15 milliLiter(s) Oral Mucosa two times a day  chlorhexidine 2% Cloths 1 Application(s) Topical <User Schedule>  enoxaparin Injectable 40 milliGRAM(s) SubCutaneous every 24 hours  famotidine    Tablet 20 milliGRAM(s) Oral two times a day  ferrous    sulfate Liquid 300 milliGRAM(s) Enteral Tube daily  furosemide    Tablet 20 milliGRAM(s) Oral daily  latanoprost 0.005% Ophthalmic Solution 1 Drop(s) Both EYES at bedtime  levoFLOXacin IVPB 750 milliGRAM(s) IV Intermittent every 24 hours  levothyroxine 100 MICROGram(s) Oral daily  mirtazapine 15 milliGRAM(s) Oral daily  norepinephrine Infusion 0.05 MICROgram(s)/kG/Min (3.57 mL/Hr) IV Continuous <Continuous>  senna 2 Tablet(s) Oral at bedtime  sodium chloride 1 Gram(s) Oral two times a day  valproic  acid Syrup 250 milliGRAM(s) Oral three times a day    MEDICATIONS  (PRN):  acetaminophen     Tablet .. 650 milliGRAM(s) Oral every 6 hours PRN Temp greater or equal to 38C (100.4F), Moderate Pain (4 - 6)

## 2023-09-19 NOTE — DIETITIAN INITIAL EVALUATION ADULT - ADD RECOMMEND
1. Recommend to adjust TF regimen via PEG: Jevity 1.2, total volume: 1200 mL, continuous. Start rate at 20 mL/hr; Goal rate at 50 mL/hr. ADD no carb prosource twice daily -- provides 120 kcal, 30g protein daily. TF regimen + no carb prosource provides 1560 kcal, 96.6g protein, 968.4 mL free water. Additional water flushes of 50 mL q6hrs -- team to adjust prn. TF regimen provides >85 - <105% of estimated needs.   2. Replete Mg prn    Pt is at high nutrition risk; will f/u in 3-5 days or prn.

## 2023-09-19 NOTE — PROGRESS NOTE ADULT - ASSESSMENT
Assessment  GEOVANY ROMERO is a 78y woman with a medical history significant for Severe COVID, seizuire disorder, pancytopenia, prior Candida auris infection who presented initially with hypotension, fever, and tachycardia, and is now in the critical care unit for septic shock due to pneumonia.       Plan  # Septic shock due to pneumonia and UTI   # Type II NSTEMI  # Hx of COPD   # Hx of acute hypoxemic respiratory failure S/P tracheostomy   # Hx of staphylococcus bacteremia, candida auris   # Hx of PEG due to oropharyngeal dysphagia   # Hypothyroidism  # Chronic Hyponatremia   # Pancytopenia, anemia  # Glaucoma  # GERD  # Seizure disorder  # Hx of CVA   # Hyperlipidemia       CNS:  Sedation: no sedation  Continue AEDs  Pt will receive brivaracetam today.  Otherwise, neuro status at baseline    HEENT:  Oral care  tracheostomy care    CVS  Levophed weaned, pt currently not on levophed  IVF boluses as tolerated for hypotension    PULMONARY  HOB @ 45 degrees, aspiration precautions  C/W PEEP to 8cm and will increase FiO2 to maintain spo2 >92%    GASTROINTESTINAL  GI prophylaxis: Pepcid BID  C/w tube feedings , dietary modified tube feedings as required  BM: on senna 2 tabs through PEG tube    GENITOURINARY/RENAL  mccauley removed, pt is voiding, no retention currently    INFECTIOUS  F/U blood and UCX  Continue empiric broad spectrum Abx: Cefepime & Levofloxacin  MRSA nares (-) hence vanc discontinued    HEMATOLOGIC  DVT PPX lovenox SQ  Duplex shows no signs of DVT even though D dimer is raised    ENDOCRINE  Follow up FS.  Insulin protocol as needed.  BG goal 140-180  Continue PTA levothyroxine    MSK/DERM  bedrest    F/U labs

## 2023-09-19 NOTE — DIETITIAN INITIAL EVALUATION ADULT - PERTINENT LABORATORY DATA
09-18    136  |  97<L>  |  17  ----------------------------<  95  3.5   |  30  |  0.6<L>    Ca    8.7      18 Sep 2023 04:20  Mg     1.2     09-18    TPro  5.3<L>  /  Alb  2.5<L>  /  TBili  0.5  /  DBili  x   /  AST  36  /  ALT  21  /  AlkPhos  148<H>  09-18

## 2023-09-19 NOTE — PROCEDURE NOTE - PROCEDURE DATE TIME, MLM
Detail Level: Zone Detail Level: Detailed Patient Specific Counseling (Will Not Stick From Patient To Patient): Blue light until fall and efudex in fall. 19-Sep-2023 13:47 Detail Level: Simple Patient Specific Counseling (Will Not Stick From Patient To Patient): A total of 1 lesion was treated with liquid nitrogen for 1 freeze-thaw cycle lasting 10-15 seconds, located on the right proximal calf. The patient's consent was obtained including but not limited to risks of pain, crusting, scabbing, blistering, scarring, darker or lighter pigmentary change, recurrence, incomplete removal and infection. I reviewed with the patient in detail post-care instructions. Patient is to avoid picking at any of the treated lesions. Pt may apply Vaseline to crusted or scabbing areas

## 2023-09-19 NOTE — DIETITIAN INITIAL EVALUATION ADULT - OTHER INFO
Pertinent Medical Information: Per H&P, pt is a 77 y/o female with PMHx of tracheostomy due to acute hypoxemic respiratory failure from COVID 2020, Hx of staph bacteremia, Candida auris, PEG tube placement due to oropharyngeal dysphagia, hypothyroidism, bradycardia, C5 compression deformity, pancytopenia, bipolar disorder, glaucoma, schizoaffective disorder, COPD, HLD, CVA, seizure disorder, GERD, S/P left hip ORIF.     Weight hx: UBW unknown. Current dosing weight 76.2 KG. Previous admission weight was  Pertinent Medical Information: Per H&P, pt is a 79 y/o female with PMHx of tracheostomy due to acute hypoxemic respiratory failure from COVID 2020, Hx of staph bacteremia, Candida auris, PEG tube placement due to oropharyngeal dysphagia, hypothyroidism, bradycardia, C5 compression deformity, pancytopenia, bipolar disorder, glaucoma, schizoaffective disorder, COPD, HLD, CVA, seizure disorder, GERD, S/P left hip ORIF.     Weight hx: UBW unknown. Current dosing weight 76.2 KG. Previous admission weight was 53.4 KG on 12/5/18; 29.9% unintentional weight gain in over  Pertinent Medical Information: Per H&P, pt is a 77 y/o female with PMHx of tracheostomy due to acute hypoxemic respiratory failure from COVID 2020, Hx of staph bacteremia, Candida auris, PEG tube placement due to oropharyngeal dysphagia, hypothyroidism, bradycardia, C5 compression deformity, pancytopenia, bipolar disorder, glaucoma, schizoaffective disorder, COPD, HLD, CVA, seizure disorder, GERD, S/P left hip ORIF.     Weight hx: UBW unknown. Current dosing weight 76.2 KG. Previous admission weight was 53.4 KG on 12/5/18; 29.9% unintentional weight gain in over 4 years compared to current dosing weight. Pt does not meet weight criteria for malnutrition at this time.

## 2023-09-20 LAB
-  AMIKACIN: SIGNIFICANT CHANGE UP
-  AMPICILLIN/SULBACTAM: SIGNIFICANT CHANGE UP
-  CEFEPIME: SIGNIFICANT CHANGE UP
-  CEFTAZIDIME: SIGNIFICANT CHANGE UP
-  CIPROFLOXACIN: SIGNIFICANT CHANGE UP
-  GENTAMICIN: SIGNIFICANT CHANGE UP
-  IMIPENEM: SIGNIFICANT CHANGE UP
-  LEVOFLOXACIN: SIGNIFICANT CHANGE UP
-  MEROPENEM: SIGNIFICANT CHANGE UP
-  PIPERACILLIN/TAZOBACTAM: SIGNIFICANT CHANGE UP
-  TOBRAMYCIN: SIGNIFICANT CHANGE UP
-  TRIMETHOPRIM/SULFAMETHOXAZOLE: SIGNIFICANT CHANGE UP
GAS PNL BLDA: SIGNIFICANT CHANGE UP
HCT VFR BLD CALC: 23.5 % — LOW (ref 37–47)
HGB BLD-MCNC: 7.6 G/DL — LOW (ref 12–16)
LACTATE SERPL-SCNC: 1.9 MMOL/L — SIGNIFICANT CHANGE UP (ref 0.7–2)
MCHC RBC-ENTMCNC: 31.7 PG — HIGH (ref 27–31)
MCHC RBC-ENTMCNC: 32.3 G/DL — SIGNIFICANT CHANGE UP (ref 32–37)
MCV RBC AUTO: 97.9 FL — SIGNIFICANT CHANGE UP (ref 81–99)
METHOD TYPE: SIGNIFICANT CHANGE UP
METHOD TYPE: SIGNIFICANT CHANGE UP
NRBC # BLD: 0 /100 WBCS — SIGNIFICANT CHANGE UP (ref 0–0)
PLATELET # BLD AUTO: 127 K/UL — LOW (ref 130–400)
PMV BLD: 9.5 FL — SIGNIFICANT CHANGE UP (ref 7.4–10.4)
RBC # BLD: 2.4 M/UL — LOW (ref 4.2–5.4)
RBC # FLD: 17.2 % — HIGH (ref 11.5–14.5)
WBC # BLD: 9.41 K/UL — SIGNIFICANT CHANGE UP (ref 4.8–10.8)
WBC # FLD AUTO: 9.41 K/UL — SIGNIFICANT CHANGE UP (ref 4.8–10.8)

## 2023-09-20 PROCEDURE — 71045 X-RAY EXAM CHEST 1 VIEW: CPT | Mod: 26

## 2023-09-20 PROCEDURE — 99233 SBSQ HOSP IP/OBS HIGH 50: CPT

## 2023-09-20 RX ORDER — MAGNESIUM OXIDE 400 MG ORAL TABLET 241.3 MG
400 TABLET ORAL
Refills: 0 | Status: COMPLETED | OUTPATIENT
Start: 2023-09-20 | End: 2023-09-21

## 2023-09-20 RX ADMIN — Medication 1 APPLICATION(S): at 17:12

## 2023-09-20 RX ADMIN — NYSTATIN CREAM 1 APPLICATION(S): 100000 CREAM TOPICAL at 09:48

## 2023-09-20 RX ADMIN — Medication 250 MILLIGRAM(S): at 21:26

## 2023-09-20 RX ADMIN — CHLORHEXIDINE GLUCONATE 1 APPLICATION(S): 213 SOLUTION TOPICAL at 05:12

## 2023-09-20 RX ADMIN — Medication 650 MILLIGRAM(S): at 05:28

## 2023-09-20 RX ADMIN — Medication 250 MILLIGRAM(S): at 13:53

## 2023-09-20 RX ADMIN — CEFEPIME 100 MILLIGRAM(S): 1 INJECTION, POWDER, FOR SOLUTION INTRAMUSCULAR; INTRAVENOUS at 04:51

## 2023-09-20 RX ADMIN — SODIUM CHLORIDE 1 GRAM(S): 9 INJECTION INTRAMUSCULAR; INTRAVENOUS; SUBCUTANEOUS at 17:12

## 2023-09-20 RX ADMIN — MIRTAZAPINE 15 MILLIGRAM(S): 45 TABLET, ORALLY DISINTEGRATING ORAL at 11:56

## 2023-09-20 RX ADMIN — Medication 250 MILLIGRAM(S): at 05:10

## 2023-09-20 RX ADMIN — Medication 1 APPLICATION(S): at 05:11

## 2023-09-20 RX ADMIN — LATANOPROST 1 DROP(S): 0.05 SOLUTION/ DROPS OPHTHALMIC; TOPICAL at 21:27

## 2023-09-20 RX ADMIN — Medication 300 MILLIGRAM(S): at 11:56

## 2023-09-20 RX ADMIN — CHLORHEXIDINE GLUCONATE 15 MILLILITER(S): 213 SOLUTION TOPICAL at 17:12

## 2023-09-20 RX ADMIN — CHLORHEXIDINE GLUCONATE 15 MILLILITER(S): 213 SOLUTION TOPICAL at 05:10

## 2023-09-20 RX ADMIN — CEFEPIME 100 MILLIGRAM(S): 1 INJECTION, POWDER, FOR SOLUTION INTRAMUSCULAR; INTRAVENOUS at 21:27

## 2023-09-20 RX ADMIN — ATORVASTATIN CALCIUM 80 MILLIGRAM(S): 80 TABLET, FILM COATED ORAL at 21:27

## 2023-09-20 RX ADMIN — MAGNESIUM OXIDE 400 MG ORAL TABLET 400 MILLIGRAM(S): 241.3 TABLET ORAL at 17:41

## 2023-09-20 RX ADMIN — Medication 650 MILLIGRAM(S): at 12:30

## 2023-09-20 RX ADMIN — Medication 650 MILLIGRAM(S): at 05:08

## 2023-09-20 RX ADMIN — Medication 20 MILLIGRAM(S): at 05:09

## 2023-09-20 RX ADMIN — BRIVARACETAM 50 MILLIGRAM(S): 25 TABLET, FILM COATED ORAL at 06:51

## 2023-09-20 RX ADMIN — Medication 650 MILLIGRAM(S): at 11:55

## 2023-09-20 RX ADMIN — FAMOTIDINE 20 MILLIGRAM(S): 10 INJECTION INTRAVENOUS at 05:10

## 2023-09-20 RX ADMIN — FAMOTIDINE 20 MILLIGRAM(S): 10 INJECTION INTRAVENOUS at 17:11

## 2023-09-20 RX ADMIN — MAGNESIUM OXIDE 400 MG ORAL TABLET 400 MILLIGRAM(S): 241.3 TABLET ORAL at 09:48

## 2023-09-20 RX ADMIN — CEFEPIME 100 MILLIGRAM(S): 1 INJECTION, POWDER, FOR SOLUTION INTRAMUSCULAR; INTRAVENOUS at 11:55

## 2023-09-20 RX ADMIN — ENOXAPARIN SODIUM 40 MILLIGRAM(S): 100 INJECTION SUBCUTANEOUS at 09:47

## 2023-09-20 RX ADMIN — BRIVARACETAM 50 MILLIGRAM(S): 25 TABLET, FILM COATED ORAL at 18:28

## 2023-09-20 RX ADMIN — SODIUM CHLORIDE 1 GRAM(S): 9 INJECTION INTRAMUSCULAR; INTRAVENOUS; SUBCUTANEOUS at 05:09

## 2023-09-20 RX ADMIN — Medication 100 MICROGRAM(S): at 05:09

## 2023-09-20 NOTE — PROGRESS NOTE ADULT - ASSESSMENT
GEOVANY ROMERO is a 78y woman with a medical history significant for Severe COVID, seizuire disorder, pancytopenia, prior Candida auris infection who presented initially with hypotension, fever, and tachycardia, and is now in the critical care unit for septic shock due to pneumonia.     Impression    # Septic shock due to pneumonia and UTI   # Numorous Acinetobacter in the sputum  # Type II NSTEMI  # Hx of COPD   # Hx of acute hypoxemic respiratory failure S/P tracheostomy   # Hx of staphylococcus bacteremia, candida auris   # Hx of PEG due to oropharyngeal dysphagia   # Hypothyroidism  # Chronic Hyponatremia   # Pancytopenia, anemia  # Glaucoma  # GERD  # Seizure disorder  # Hx of CVA   # Hyperlipidemia     Plan:      CNS:  Sedation: no sedation  Continue AEDs, dose obtained from SNF  Otherwise, neuro status at baseline    HEENT:  Oral care, trach care.    CARDIOVASCULAR  Vasopressors: Off pressors, maintain MAP > 65  Goal directed volume rescuctiation     PULMONARY  HOB @ 45 degrees, aspiration precautions  Target plateau pressure < 30, driving pressure <15  Titrate FiO2 as tolerated to maintain spO2 > 92%  Long term ventilated, no role for SBT    GASTROINTESTINAL  GI prophylaxis: Pepcid BID  Feeds: TF at goal  BM: regimen PRN    GENITOURINARY/RENAL  No Davis  Monitor I&O  No acute renal injury, monitor UOP and bolus IVF as needed    INFECTIOUS  Follow-up blood & UCx, and DTA  Continue empiric broad spectrum Abx: Cefepime & Levofloxacin, target 7 day course  Repeat cultures PRN fever    HEMATOLOGIC  DVT ppx: lovenox SQ    ENDOCRINE  Follow up FS.  Insulin protocol as needed.  BG goal 140-180  Continue PTA levothyroxine    MSK/DERM  bedrest      CODE STATUS: FULL  DISPO: SDU GEOVANY ROMERO is a 78y woman with a medical history significant for Severe COVID, seizuire disorder, pancytopenia, prior Candida auris infection who presented initially with hypotension, fever, and tachycardia, and is now in the critical care unit for septic shock due to pneumonia.     Impression    # Septic shock due to pneumonia and UTI   # Numorous Acinetobacter in the sputum  # Type II NSTEMI  # Hx of COPD   # Hx of acute hypoxemic respiratory failure S/P tracheostomy   # Hx of staphylococcus bacteremia, candida auris   # Hx of PEG due to oropharyngeal dysphagia   # Hypothyroidism  # Chronic Hyponatremia   # Pancytopenia, anemia  # Glaucoma  # GERD  # Seizure disorder  # Hx of CVA   # Hyperlipidemia     Plan:      CNS:  Sedation: no sedation  Continue AEDs, dose obtained from SNF  Otherwise, neuro status at baseline    HEENT:  Oral care, trach care.    CARDIOVASCULAR  Vasopressors: Off pressors, maintain MAP > 65    PULMONARY  HOB @ 45 degrees, aspiration precautions  Target plateau pressure < 30, driving pressure <15  Titrate FiO2 as tolerated to maintain spO2 > 92%  Long term ventilated, no role for SBT    GASTROINTESTINAL  GI prophylaxis: Pepcid BID  Feeds: TF at goal  BM: regimen PRN    GENITOURINARY/RENAL  No Davis  Monitor I&O  No acute renal injury, monitor UOP and bolus IVF as needed    INFECTIOUS  Blood and urine culture negative; Acinetobacter in the DTA; follow up sensitivities; ID   Continue empiric broad spectrum Abx: Cefepime & Levofloxacin, target 7 day course    HEMATOLOGIC  DVT ppx: lovenox SQ    ENDOCRINE  Follow up FS.  Insulin protocol as needed.  BG goal 140-180  Continue PTA levothyroxine    MSK/DERM  bedrest      CODE STATUS: FULL  DISPO: 3 A vent unit

## 2023-09-20 NOTE — CHART NOTE - NSCHARTNOTEFT_GEN_A_CORE
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 However the patient has an allergy to penicillin. Called [facility] at [place] ([place]) to request sensitivities to colistin and tetracyclines. I was informed that they run these sensitivities automatically. I placed an ID consult

## 2023-09-20 NOTE — PROGRESS NOTE ADULT - SUBJECTIVE AND OBJECTIVE BOX
Patient is a 78y old  Female who presents with a chief complaint of Septic shanelle (19 Sep 2023 12:37)        Over Night Events: Remains on MV. Off pressors.        ROS:     All pertinent ROS are negative except HPI         PHYSICAL EXAM    ICU Vital Signs Last 24 Hrs  T(C): 36.6 (20 Sep 2023 04:00), Max: 37.4 (19 Sep 2023 12:00)  T(F): 97.8 (20 Sep 2023 04:00), Max: 99.4 (19 Sep 2023 12:00)  HR: 101 (20 Sep 2023 06:00) (76 - 114)  BP: 109/57 (20 Sep 2023 06:00) (93/47 - 163/70)  BP(mean): 76 (20 Sep 2023 06:00) (67 - 101)  ABP: --  ABP(mean): --  RR: 37 (20 Sep 2023 06:00) (21 - 40)  SpO2: 100% (20 Sep 2023 06:00) (95% - 100%)    O2 Parameters below as of 20 Sep 2023 06:00  Patient On (Oxygen Delivery Method): tracheostomy collar  O2 Flow (L/min): 40          CONSTITUTIONAL:  NAD    ENT:   Trach    EYES:   Pupils equal    CARDIAC:   Normal rate,   Regular rhythm.      RESPIRATORY:   No wheezing  Bilateral BS  Normal chest expansion  Not tachypneic,  No use of accessory muscles    GASTROINTESTINAL:  Abdomen soft,   Non-tender,   No guarding,   + BS    MUSCULOSKELETAL:   Range of motion is not limited,  No clubbing, cyanosis    NEUROLOGICAL:   At baseline    PSYCHIATRIC:   No apparent risk to self or others.      09-19-23 @ 07:01  -  09-20-23 @ 07:00  --------------------------------------------------------  IN:    Enteral Tube Flush: 450 mL    Free Water: 300 mL    IV PiggyBack: 250 mL    Jevity 1.2: 1150 mL    Norepinephrine: 23 mL  Total IN: 2173 mL    OUT:    Incontinent per Collection Bag (mL): 2050 mL    Indwelling Catheter - Urethral (mL): 475 mL  Total OUT: 2525 mL    Total NET: -352 mL          LABS:                            8.1    10.76 )-----------( 89       ( 19 Sep 2023 15:27 )             24.9                                               09-19    134<L>  |  99  |  15  ----------------------------<  146<H>  4.6   |  25  |  0.6<L>    Ca    8.0<L>      19 Sep 2023 12:30  Mg     1.7     09-19    TPro  5.3<L>  /  Alb  2.2<L>  /  TBili  0.3  /  DBili  x   /  AST  34  /  ALT  21  /  AlkPhos  153<H>  09-19                                             Urinalysis Basic - ( 19 Sep 2023 12:30 )    Color: x / Appearance: x / SG: x / pH: x  Gluc: 146 mg/dL / Ketone: x  / Bili: x / Urobili: x   Blood: x / Protein: x / Nitrite: x   Leuk Esterase: x / RBC: x / WBC x   Sq Epi: x / Non Sq Epi: x / Bacteria: x                                                  LIVER FUNCTIONS - ( 19 Sep 2023 12:30 )  Alb: 2.2 g/dL / Pro: 5.3 g/dL / ALK PHOS: 153 U/L / ALT: 21 U/L / AST: 34 U/L / GGT: x                                                  Culture - Sputum (collected 18 Sep 2023 12:20)  Source: Trach Asp Tracheal Aspirate  Gram Stain (19 Sep 2023 02:35):    Moderate polymorphonuclear leukocytes per low power field    Rare Squamous epithelial cells per low power field    Moderate Gram Negative Rods per oil power field    Few Gram Positive Rods per oil power field  Preliminary Report (19 Sep 2023 20:49):    Numerous Acinetobacter baumannii/nosocomialis group    Normal Respiratory Sara absent    Culture - Blood (collected 17 Sep 2023 11:38)  Source: .Blood Blood-Peripheral  Preliminary Report (19 Sep 2023 20:01):    No growth at 48 Hours    Culture - Blood (collected 17 Sep 2023 11:38)  Source: .Blood Blood-Peripheral  Preliminary Report (19 Sep 2023 20:01):    No growth at 48 Hours                                                   Mode: AC/ CMV (Assist Control/ Continuous Mandatory Ventilation)  RR (machine): 20  TV (machine): 400  FiO2: 40  PEEP: 8  ITime: 1  MAP: 10  PIP: 31                                      ABG - ( 20 Sep 2023 04:47 )  pH, Arterial: 7.47  pH, Blood: x     /  pCO2: 42    /  pO2: 85    / HCO3: 31    / Base Excess: 6.2   /  SaO2: 97.9                MEDICATIONS  (STANDING):  albuterol/ipratropium for Nebulization 3 milliLiter(s) Nebulizer every 6 hours  atorvastatin 80 milliGRAM(s) Oral at bedtime  bacitracin   Ointment 1 Application(s) Topical two times a day  brivaracetam Oral Solution 50 milliGRAM(s) Oral two times a day  cefepime   IVPB 2000 milliGRAM(s) IV Intermittent every 8 hours  chlorhexidine 0.12% Liquid 15 milliLiter(s) Oral Mucosa two times a day  chlorhexidine 2% Cloths 1 Application(s) Topical <User Schedule>  enoxaparin Injectable 40 milliGRAM(s) SubCutaneous every 24 hours  famotidine    Tablet 20 milliGRAM(s) Oral two times a day  ferrous    sulfate Liquid 300 milliGRAM(s) Enteral Tube daily  furosemide    Tablet 20 milliGRAM(s) Oral daily  latanoprost 0.005% Ophthalmic Solution 1 Drop(s) Both EYES at bedtime  levoFLOXacin IVPB 750 milliGRAM(s) IV Intermittent every 24 hours  levothyroxine 100 MICROGram(s) Oral daily  magnesium oxide 400 milliGRAM(s) Oral two times a day with meals  mirtazapine 15 milliGRAM(s) Oral daily  norepinephrine Infusion 0.05 MICROgram(s)/kG/Min (3.57 mL/Hr) IV Continuous <Continuous>  nystatin Powder 1 Application(s) Topical once  senna 2 Tablet(s) Oral at bedtime  sodium chloride 1 Gram(s) Oral two times a day  valproic  acid Syrup 250 milliGRAM(s) Oral three times a day    MEDICATIONS  (PRN):  acetaminophen     Tablet .. 650 milliGRAM(s) Oral every 6 hours PRN Temp greater or equal to 38C (100.4F), Moderate Pain (4 - 6)      New X-rays reviewed:                                                                                  ECHO    CXR interpreted by me:       Patient is a 78y old  Female who presents with a chief complaint of Septic shanelle (19 Sep 2023 12:37)        Over Night Events: Remains on MV. Off pressors.        ROS:     All pertinent ROS are negative except HPI         PHYSICAL EXAM    ICU Vital Signs Last 24 Hrs  T(C): 36.6 (20 Sep 2023 04:00), Max: 37.4 (19 Sep 2023 12:00)  T(F): 97.8 (20 Sep 2023 04:00), Max: 99.4 (19 Sep 2023 12:00)  HR: 101 (20 Sep 2023 06:00) (76 - 114)  BP: 109/57 (20 Sep 2023 06:00) (93/47 - 163/70)  BP(mean): 76 (20 Sep 2023 06:00) (67 - 101)  ABP: --  ABP(mean): --  RR: 37 (20 Sep 2023 06:00) (21 - 40)  SpO2: 100% (20 Sep 2023 06:00) (95% - 100%)    O2 Parameters below as of 20 Sep 2023 06:00  Patient On (Oxygen Delivery Method): tracheostomy collar  O2 Flow (L/min): 40          CONSTITUTIONAL:  NAD    ENT:   Trach    EYES:   Pupils equal    CARDIAC:   Normal rate,   Regular rhythm.      RESPIRATORY:   No wheezing  Bilateral BS  Normal chest expansion  Not tachypneic,  No use of accessory muscles    GASTROINTESTINAL:  Abdomen soft,   Non-tender,   No guarding,   + BS    MUSCULOSKELETAL:   Range of motion is not limited,  No clubbing, cyanosis    NEUROLOGICAL:   At baseline    PSYCHIATRIC:   No apparent risk to self or others.      09-19-23 @ 07:01  -  09-20-23 @ 07:00  --------------------------------------------------------  IN:    Enteral Tube Flush: 450 mL    Free Water: 300 mL    IV PiggyBack: 250 mL    Jevity 1.2: 1150 mL    Norepinephrine: 23 mL  Total IN: 2173 mL    OUT:    Incontinent per Collection Bag (mL): 2050 mL    Indwelling Catheter - Urethral (mL): 475 mL  Total OUT: 2525 mL    Total NET: -352 mL          LABS:                            8.1    10.76 )-----------( 89       ( 19 Sep 2023 15:27 )             24.9                                               09-19    134<L>  |  99  |  15  ----------------------------<  146<H>  4.6   |  25  |  0.6<L>    Ca    8.0<L>      19 Sep 2023 12:30  Mg     1.7     09-19    TPro  5.3<L>  /  Alb  2.2<L>  /  TBili  0.3  /  DBili  x   /  AST  34  /  ALT  21  /  AlkPhos  153<H>  09-19                                             Urinalysis Basic - ( 19 Sep 2023 12:30 )    Color: x / Appearance: x / SG: x / pH: x  Gluc: 146 mg/dL / Ketone: x  / Bili: x / Urobili: x   Blood: x / Protein: x / Nitrite: x   Leuk Esterase: x / RBC: x / WBC x   Sq Epi: x / Non Sq Epi: x / Bacteria: x                                                  LIVER FUNCTIONS - ( 19 Sep 2023 12:30 )  Alb: 2.2 g/dL / Pro: 5.3 g/dL / ALK PHOS: 153 U/L / ALT: 21 U/L / AST: 34 U/L / GGT: x                                                  Culture - Sputum (collected 18 Sep 2023 12:20)  Source: Trach Asp Tracheal Aspirate  Gram Stain (19 Sep 2023 02:35):    Moderate polymorphonuclear leukocytes per low power field    Rare Squamous epithelial cells per low power field    Moderate Gram Negative Rods per oil power field    Few Gram Positive Rods per oil power field  Preliminary Report (19 Sep 2023 20:49):    Numerous Acinetobacter baumannii/nosocomialis group    Normal Respiratory Sara absent    Culture - Blood (collected 17 Sep 2023 11:38)  Source: .Blood Blood-Peripheral  Preliminary Report (19 Sep 2023 20:01):    No growth at 48 Hours    Culture - Blood (collected 17 Sep 2023 11:38)  Source: .Blood Blood-Peripheral  Preliminary Report (19 Sep 2023 20:01):    No growth at 48 Hours                                                   Mode: AC/ CMV (Assist Control/ Continuous Mandatory Ventilation)  RR (machine): 20  TV (machine): 400  FiO2: 40  PEEP: 8  ITime: 1  MAP: 10  PIP: 31                                      ABG - ( 20 Sep 2023 04:47 )  pH, Arterial: 7.47  pH, Blood: x     /  pCO2: 42    /  pO2: 85    / HCO3: 31    / Base Excess: 6.2   /  SaO2: 97.9                MEDICATIONS  (STANDING):  albuterol/ipratropium for Nebulization 3 milliLiter(s) Nebulizer every 6 hours  atorvastatin 80 milliGRAM(s) Oral at bedtime  bacitracin   Ointment 1 Application(s) Topical two times a day  brivaracetam Oral Solution 50 milliGRAM(s) Oral two times a day  cefepime   IVPB 2000 milliGRAM(s) IV Intermittent every 8 hours  chlorhexidine 0.12% Liquid 15 milliLiter(s) Oral Mucosa two times a day  chlorhexidine 2% Cloths 1 Application(s) Topical <User Schedule>  enoxaparin Injectable 40 milliGRAM(s) SubCutaneous every 24 hours  famotidine    Tablet 20 milliGRAM(s) Oral two times a day  ferrous    sulfate Liquid 300 milliGRAM(s) Enteral Tube daily  furosemide    Tablet 20 milliGRAM(s) Oral daily  latanoprost 0.005% Ophthalmic Solution 1 Drop(s) Both EYES at bedtime  levoFLOXacin IVPB 750 milliGRAM(s) IV Intermittent every 24 hours  levothyroxine 100 MICROGram(s) Oral daily  magnesium oxide 400 milliGRAM(s) Oral two times a day with meals  mirtazapine 15 milliGRAM(s) Oral daily  nystatin Powder 1 Application(s) Topical once  senna 2 Tablet(s) Oral at bedtime  sodium chloride 1 Gram(s) Oral two times a day  valproic  acid Syrup 250 milliGRAM(s) Oral three times a day    MEDICATIONS  (PRN):  acetaminophen     Tablet .. 650 milliGRAM(s) Oral every 6 hours PRN Temp greater or equal to 38C (100.4F), Moderate Pain (4 - 6)      New X-rays reviewed:                                                                                  ECHO    CXR interpreted by me:

## 2023-09-20 NOTE — CHART NOTE - NSCHARTNOTEFT_GEN_A_CORE
Transfer from : CCU  Transfer to     : SDU    A 78 year old female with past medical history of tracheostomy due to acute hypoxemic respiratory failure from COVID , Hx of staph bacteremia, Candida auris, PEG tube placement due to oropharyngeal dysphagia, hypothyroidism, bradycardia, C5 compression deformity, pancytopenia, bipolar disorder, glaucoma, schizoaffective disorder, COPD, HLD, CVA, seizure disorder, GERD, S/P left hip ORIF.Presenting from [facility] for evaluation of episodes of bradycardia tachycardia, fever, low blood pressure. found to be hypotensive in the field requiring two pushes of epinephrine.   In the ED found to be hypotensive, started on bolus of LR, right femoral line was inserted, given 1 dose of vancomycin and aztreonam.     In the CCU the patient was treated for septic shock due to aspiration pneumonia. The pt was on levophed for 3 days and required lower rates of infusions as the days advanced. A trial of void was performed on 23 and it resulted in the pt being able to void, hence the catheter was not reinserted. The pt has been off levophed for more than 24 hrs and is stable to be moved to the step down unit. Her ventilations settings have been stable throughout her admission. Continue with current antibiotic course for a total of 7 days    24H events:    Patient is a 78y old Female who presents with a chief complaint of Hypotension (20 Sep 2023 08:04)    Primary diagnosis of Sepsis       Today is hospital day 3d.      PAST MEDICAL & SURGICAL HISTORY  CVA (cerebral vascular accident)    HLD (hyperlipidemia)    COPD (chronic obstructive pulmonary disease)    Pseudoseizure    Vertigo    Schizoaffective disorder, depressive type    Anemia    Glaucoma, unspecified glaucoma type, unspecified laterality    Bipolar disorder    History of inguinal herniorrhaphy    History of ankle surgery    H/O:       SOCIAL HISTORY:  Negative for smoking/alcohol/drug use.     ALLERGIES:  Tegretol (Unknown)  penicillin (Unknown)  Keppra (Unknown)  Dilantin (Unknown)  phenobarbital (Unknown)    MEDICATIONS:  STANDING MEDICATIONS  albuterol/ipratropium for Nebulization 3 milliLiter(s) Nebulizer every 6 hours  atorvastatin 80 milliGRAM(s) Oral at bedtime  bacitracin   Ointment 1 Application(s) Topical two times a day  brivaracetam Oral Solution 50 milliGRAM(s) Oral two times a day  cefepime   IVPB 2000 milliGRAM(s) IV Intermittent every 8 hours  chlorhexidine 0.12% Liquid 15 milliLiter(s) Oral Mucosa two times a day  chlorhexidine 2% Cloths 1 Application(s) Topical <User Schedule>  enoxaparin Injectable 40 milliGRAM(s) SubCutaneous every 24 hours  famotidine    Tablet 20 milliGRAM(s) Oral two times a day  ferrous    sulfate Liquid 300 milliGRAM(s) Enteral Tube daily  furosemide    Tablet 20 milliGRAM(s) Oral daily  latanoprost 0.005% Ophthalmic Solution 1 Drop(s) Both EYES at bedtime  levoFLOXacin IVPB 750 milliGRAM(s) IV Intermittent every 24 hours  levothyroxine 100 MICROGram(s) Oral daily  mirtazapine 15 milliGRAM(s) Oral daily  norepinephrine Infusion 0.05 MICROgram(s)/kG/Min IV Continuous <Continuous>  senna 2 Tablet(s) Oral at bedtime  sodium chloride 1 Gram(s) Oral two times a day  valproic  acid Syrup 250 milliGRAM(s) Oral three times a day    PRN MEDICATIONS  acetaminophen     Tablet .. 650 milliGRAM(s) Oral every 6 hours PRN    VITALS:   T(F): 98.7  HR: 86  BP: 93/52  RR: 22  SpO2: 100%    LABS:                        8.1    10.76 )-----------( 89       ( 19 Sep 2023 15:27 )             24.9         134<L>  |  99  |  15  ----------------------------<  146<H>  4.6   |  25  |  0.6<L>    Ca    8.0<L>      19 Sep 2023 12:30  Mg     1.7         TPro  5.3<L>  /  Alb  2.2<L>  /  TBili  0.3  /  DBili  x   /  AST  34  /  ALT  21  /  AlkPhos  153<H>        Urinalysis Basic - ( 19 Sep 2023 12:30 )    Color: x / Appearance: x / SG: x / pH: x  Gluc: 146 mg/dL / Ketone: x  / Bili: x / Urobili: x   Blood: x / Protein: x / Nitrite: x   Leuk Esterase: x / RBC: x / WBC x   Sq Epi: x / Non Sq Epi: x / Bacteria: x      ABG - ( 20 Sep 2023 04:47 )  pH, Arterial: 7.47  pH, Blood: x     /  pCO2: 42    /  pO2: 85    / HCO3: 31    / Base Excess: 6.2   /  SaO2: 97.9                  Culture - Sputum (collected 18 Sep 2023 12:20)  Source: Trach Asp Tracheal Aspirate  Gram Stain (19 Sep 2023 02:35):    Moderate polymorphonuclear leukocytes per low power field    Rare Squamous epithelial cells per low power field    Moderate Gram Negative Rods per oil power field    Few Gram Positive Rods per oil power field  Preliminary Report (19 Sep 2023 20:49):    Numerous Acinetobacter baumannii/nosocomialis group    Normal Respiratory Sara absent          RADIOLOGY:    PHYSICAL EXAM:  GENERAL: NAD    NECK: Supple, No JVD, Normal thyroid  NERVOUS SYSTEM:  Pt at baseline  CHEST/LUNG: Mild wheezing and rhonchi  HEART: Regular rate and rhythm; No murmurs, rubs, or gallops  ABDOMEN: Soft, Nontender, Nondistended; Bowel sounds present  EXTREMITIES:  + Peripheral Pulses, UE edema    # Septic shock due to pneumonia and UTI   # Type II NSTEMI  # Hx of COPD   # Hx of acute hypoxemic respiratory failure S/P tracheostomy   HOB @ 45 degrees, aspiration precautions  C/W PEEP to 8cm and will increase FiO2 to maintain spo2 >92%  C/w antibiotics  Levophed weaned, pt currently not on levophed  IVF boluses as tolerated for hypotension  Oral care  tracheostomy care    # Hx of staphylococcus bacteremia, candida auris   Contact precaution    # Hx of PEG due to oropharyngeal dysphagia   C/W tube feeds  Oral care  tracheostomy care    # Hypothyroidism  C/W levothyroxine    # Chronic Hyponatremia===>resolved    # Seizure disorder  # Hx of CVA   Continue AEDs  C/W brivaracetam  Otherwise, neuro status at baseline

## 2023-09-21 LAB
-  COLOSTIN: SIGNIFICANT CHANGE UP
-  MINOCYCLINE: SIGNIFICANT CHANGE UP
-  POLYMYXIN B: SIGNIFICANT CHANGE UP
ALBUMIN SERPL ELPH-MCNC: 2.6 G/DL — LOW (ref 3.5–5.2)
ALP SERPL-CCNC: 169 U/L — HIGH (ref 30–115)
ALT FLD-CCNC: 22 U/L — SIGNIFICANT CHANGE UP (ref 0–41)
ANION GAP SERPL CALC-SCNC: 11 MMOL/L — SIGNIFICANT CHANGE UP (ref 7–14)
AST SERPL-CCNC: 35 U/L — SIGNIFICANT CHANGE UP (ref 0–41)
BASOPHILS # BLD AUTO: 0 K/UL — SIGNIFICANT CHANGE UP (ref 0–0.2)
BASOPHILS NFR BLD AUTO: 0 % — SIGNIFICANT CHANGE UP (ref 0–1)
BILIRUB SERPL-MCNC: 0.3 MG/DL — SIGNIFICANT CHANGE UP (ref 0.2–1.2)
BUN SERPL-MCNC: 13 MG/DL — SIGNIFICANT CHANGE UP (ref 10–20)
CALCIUM SERPL-MCNC: 9 MG/DL — SIGNIFICANT CHANGE UP (ref 8.4–10.5)
CHLORIDE SERPL-SCNC: 97 MMOL/L — LOW (ref 98–110)
CO2 SERPL-SCNC: 26 MMOL/L — SIGNIFICANT CHANGE UP (ref 17–32)
CREAT SERPL-MCNC: 0.5 MG/DL — LOW (ref 0.7–1.5)
EGFR: 95 ML/MIN/1.73M2 — SIGNIFICANT CHANGE UP
EOSINOPHIL # BLD AUTO: 0.19 K/UL — SIGNIFICANT CHANGE UP (ref 0–0.7)
EOSINOPHIL NFR BLD AUTO: 1.7 % — SIGNIFICANT CHANGE UP (ref 0–8)
GLUCOSE SERPL-MCNC: 120 MG/DL — HIGH (ref 70–99)
HCT VFR BLD CALC: 27.2 % — LOW (ref 37–47)
HGB BLD-MCNC: 8.6 G/DL — LOW (ref 12–16)
LYMPHOCYTES # BLD AUTO: 1.07 K/UL — LOW (ref 1.2–3.4)
LYMPHOCYTES # BLD AUTO: 9.6 % — LOW (ref 20.5–51.1)
MAGNESIUM SERPL-MCNC: 1.5 MG/DL — LOW (ref 1.8–2.4)
MCHC RBC-ENTMCNC: 31 PG — SIGNIFICANT CHANGE UP (ref 27–31)
MCHC RBC-ENTMCNC: 31.6 G/DL — LOW (ref 32–37)
MCV RBC AUTO: 98.2 FL — SIGNIFICANT CHANGE UP (ref 81–99)
MONOCYTES # BLD AUTO: 2.41 K/UL — HIGH (ref 0.1–0.6)
MONOCYTES NFR BLD AUTO: 21.7 % — HIGH (ref 1.7–9.3)
NEUTROPHILS # BLD AUTO: 6.48 K/UL — SIGNIFICANT CHANGE UP (ref 1.4–6.5)
NEUTROPHILS NFR BLD AUTO: 58.3 % — SIGNIFICANT CHANGE UP (ref 42.2–75.2)
PLATELET # BLD AUTO: 176 K/UL — SIGNIFICANT CHANGE UP (ref 130–400)
PMV BLD: 9.8 FL — SIGNIFICANT CHANGE UP (ref 7.4–10.4)
POTASSIUM SERPL-MCNC: 5 MMOL/L — SIGNIFICANT CHANGE UP (ref 3.5–5)
POTASSIUM SERPL-SCNC: 5 MMOL/L — SIGNIFICANT CHANGE UP (ref 3.5–5)
PROT SERPL-MCNC: 6 G/DL — SIGNIFICANT CHANGE UP (ref 6–8)
RBC # BLD: 2.77 M/UL — LOW (ref 4.2–5.4)
RBC # FLD: 17.3 % — HIGH (ref 11.5–14.5)
SODIUM SERPL-SCNC: 134 MMOL/L — LOW (ref 135–146)
WBC # BLD: 11.12 K/UL — HIGH (ref 4.8–10.8)
WBC # FLD AUTO: 11.12 K/UL — HIGH (ref 4.8–10.8)

## 2023-09-21 PROCEDURE — 71045 X-RAY EXAM CHEST 1 VIEW: CPT | Mod: 26

## 2023-09-21 PROCEDURE — 99232 SBSQ HOSP IP/OBS MODERATE 35: CPT | Mod: GC

## 2023-09-21 RX ORDER — MINOCYCLINE HYDROCHLORIDE 45 MG/1
TABLET, EXTENDED RELEASE ORAL
Refills: 0 | Status: DISCONTINUED | OUTPATIENT
Start: 2023-09-21 | End: 2023-09-26

## 2023-09-21 RX ORDER — EPINEPHRINE 0.3 MG/.3ML
0.3 INJECTION INTRAMUSCULAR; SUBCUTANEOUS ONCE
Refills: 0 | Status: DISCONTINUED | OUTPATIENT
Start: 2023-09-21 | End: 2023-09-26

## 2023-09-21 RX ORDER — MAGNESIUM SULFATE 500 MG/ML
4 VIAL (ML) INJECTION ONCE
Refills: 0 | Status: COMPLETED | OUTPATIENT
Start: 2023-09-21 | End: 2023-09-21

## 2023-09-21 RX ORDER — DIPHENHYDRAMINE HCL 50 MG
50 CAPSULE ORAL ONCE
Refills: 0 | Status: DISCONTINUED | OUTPATIENT
Start: 2023-09-21 | End: 2023-09-26

## 2023-09-21 RX ORDER — MINOCYCLINE HYDROCHLORIDE 45 MG/1
200 TABLET, EXTENDED RELEASE ORAL ONCE
Refills: 0 | Status: COMPLETED | OUTPATIENT
Start: 2023-09-21 | End: 2023-09-21

## 2023-09-21 RX ORDER — HYDROCORTISONE 20 MG
100 TABLET ORAL ONCE
Refills: 0 | Status: DISCONTINUED | OUTPATIENT
Start: 2023-09-21 | End: 2023-09-26

## 2023-09-21 RX ORDER — MINOCYCLINE HYDROCHLORIDE 45 MG/1
200 TABLET, EXTENDED RELEASE ORAL EVERY 12 HOURS
Refills: 0 | Status: DISCONTINUED | OUTPATIENT
Start: 2023-09-22 | End: 2023-09-26

## 2023-09-21 RX ORDER — AMPICILLIN SODIUM AND SULBACTAM SODIUM 250; 125 MG/ML; MG/ML
1.5 INJECTION, POWDER, FOR SUSPENSION INTRAMUSCULAR; INTRAVENOUS ONCE
Refills: 0 | Status: COMPLETED | OUTPATIENT
Start: 2023-09-21 | End: 2023-09-21

## 2023-09-21 RX ORDER — CEFIDEROCOL SULFATE TOSYLATE 1 G/10ML
1500 INJECTION, POWDER, FOR SOLUTION INTRAVENOUS EVERY 8 HOURS
Refills: 0 | Status: DISCONTINUED | OUTPATIENT
Start: 2023-09-21 | End: 2023-09-22

## 2023-09-21 RX ADMIN — AMPICILLIN SODIUM AND SULBACTAM SODIUM 200 GRAM(S): 250; 125 INJECTION, POWDER, FOR SUSPENSION INTRAMUSCULAR; INTRAVENOUS at 17:02

## 2023-09-21 RX ADMIN — BRIVARACETAM 50 MILLIGRAM(S): 25 TABLET, FILM COATED ORAL at 06:04

## 2023-09-21 RX ADMIN — BRIVARACETAM 50 MILLIGRAM(S): 25 TABLET, FILM COATED ORAL at 18:26

## 2023-09-21 RX ADMIN — FAMOTIDINE 20 MILLIGRAM(S): 10 INJECTION INTRAVENOUS at 06:03

## 2023-09-21 RX ADMIN — CEFEPIME 100 MILLIGRAM(S): 1 INJECTION, POWDER, FOR SOLUTION INTRAMUSCULAR; INTRAVENOUS at 06:02

## 2023-09-21 RX ADMIN — SODIUM CHLORIDE 1 GRAM(S): 9 INJECTION INTRAMUSCULAR; INTRAVENOUS; SUBCUTANEOUS at 17:10

## 2023-09-21 RX ADMIN — MAGNESIUM OXIDE 400 MG ORAL TABLET 400 MILLIGRAM(S): 241.3 TABLET ORAL at 07:53

## 2023-09-21 RX ADMIN — Medication 1 APPLICATION(S): at 17:10

## 2023-09-21 RX ADMIN — CHLORHEXIDINE GLUCONATE 1 APPLICATION(S): 213 SOLUTION TOPICAL at 06:04

## 2023-09-21 RX ADMIN — Medication 300 MILLIGRAM(S): at 11:57

## 2023-09-21 RX ADMIN — Medication 25 GRAM(S): at 07:52

## 2023-09-21 RX ADMIN — CHLORHEXIDINE GLUCONATE 15 MILLILITER(S): 213 SOLUTION TOPICAL at 06:02

## 2023-09-21 RX ADMIN — Medication 20 MILLIGRAM(S): at 06:04

## 2023-09-21 RX ADMIN — ENOXAPARIN SODIUM 40 MILLIGRAM(S): 100 INJECTION SUBCUTANEOUS at 09:59

## 2023-09-21 RX ADMIN — CEFIDEROCOL SULFATE TOSYLATE 33.33 MILLIGRAM(S): 1 INJECTION, POWDER, FOR SOLUTION INTRAVENOUS at 17:19

## 2023-09-21 RX ADMIN — MINOCYCLINE HYDROCHLORIDE 100 MILLIGRAM(S): 45 TABLET, EXTENDED RELEASE ORAL at 15:43

## 2023-09-21 RX ADMIN — Medication 250 MILLIGRAM(S): at 21:02

## 2023-09-21 RX ADMIN — CEFEPIME 100 MILLIGRAM(S): 1 INJECTION, POWDER, FOR SOLUTION INTRAMUSCULAR; INTRAVENOUS at 11:57

## 2023-09-21 RX ADMIN — Medication 250 MILLIGRAM(S): at 06:02

## 2023-09-21 RX ADMIN — Medication 650 MILLIGRAM(S): at 06:33

## 2023-09-21 RX ADMIN — Medication 1 APPLICATION(S): at 06:03

## 2023-09-21 RX ADMIN — FAMOTIDINE 20 MILLIGRAM(S): 10 INJECTION INTRAVENOUS at 17:10

## 2023-09-21 RX ADMIN — Medication 650 MILLIGRAM(S): at 06:05

## 2023-09-21 RX ADMIN — CHLORHEXIDINE GLUCONATE 15 MILLILITER(S): 213 SOLUTION TOPICAL at 17:10

## 2023-09-21 RX ADMIN — ATORVASTATIN CALCIUM 80 MILLIGRAM(S): 80 TABLET, FILM COATED ORAL at 21:02

## 2023-09-21 RX ADMIN — MIRTAZAPINE 15 MILLIGRAM(S): 45 TABLET, ORALLY DISINTEGRATING ORAL at 11:57

## 2023-09-21 RX ADMIN — Medication 100 MICROGRAM(S): at 06:03

## 2023-09-21 RX ADMIN — SENNA PLUS 2 TABLET(S): 8.6 TABLET ORAL at 21:02

## 2023-09-21 RX ADMIN — LATANOPROST 1 DROP(S): 0.05 SOLUTION/ DROPS OPHTHALMIC; TOPICAL at 21:02

## 2023-09-21 RX ADMIN — SODIUM CHLORIDE 1 GRAM(S): 9 INJECTION INTRAMUSCULAR; INTRAVENOUS; SUBCUTANEOUS at 06:03

## 2023-09-21 NOTE — CONSULT NOTE ADULT - SUBJECTIVE AND OBJECTIVE BOX
GEOVANY ROMERO  78y, Female  Allergy: Topamax (Drowsiness)  Tegretol (Unknown)  penicillin (Unknown)  Keppra (Unknown)  Dilantin (Unknown)  phenobarbital (Unknown)      All historical available data reviewed.    HPI:  a 78 year old female with past medical history of tracheostomy due to acute hypoxemic respiratory failure from COVID , Hx of staph bacteremia, Candida auris, PEG tube placement due to oropharyngeal dysphagia, hypothyroidism, bradycardia, C5 compression deformity, pancytopenia, bipolar disorder, glaucoma, schizoaffective disorder, COPD, HLD, CVA, seizure disorder, GERD, S/P left hip ORIF.     Presenting from [facility] for evaluation of episodes of bradycardia tachycardia, fever, low blood pressure. found to be hypotensive in the field requiring two pushes of epinephrine.   in the ED found to be hypotensive, started on bolus of LR, right femoral line was inserted, given 1 dose of vancomycin and aztreonam.   Didn't require pressors.  last lactate 6  admitted to ICU for further evaluation.      (17 Sep 2023 16:46)  ID called fo possible PNA    FAMILY HISTORY:  Family history of cerebrovascular accident (CVA) (Sibling)      PAST MEDICAL & SURGICAL HISTORY:  CVA (cerebral vascular accident)      HLD (hyperlipidemia)      COPD (chronic obstructive pulmonary disease)      Pseudoseizure      Vertigo      Schizoaffective disorder, depressive type      Anemia      Glaucoma, unspecified glaucoma type, unspecified laterality      Bipolar disorder      History of inguinal herniorrhaphy      History of ankle surgery      H/O:             VITALS:  T(F): 98.1, Max: 98.9 (23 @ 08:00)  HR: 80  BP: 95/51  RR: 21Vital Signs Last 24 Hrs  T(C): 36.7 (21 Sep 2023 12:00), Max: 37.2 (21 Sep 2023 08:00)  T(F): 98.1 (21 Sep 2023 12:00), Max: 98.9 (21 Sep 2023 08:00)  HR: 80 (21 Sep 2023 12:00) (77 - 106)  BP: 95/51 (21 Sep 2023 12:00) (95/51 - 130/60)  BP(mean): 70 (21 Sep 2023 12:00) (70 - 87)  RR: 21 (21 Sep 2023 12:00) (21 - 34)  SpO2: 99% (21 Sep 2023 12:00) (99% - 100%)    Parameters below as of 21 Sep 2023 12:00  Patient On (Oxygen Delivery Method): tracheostomy collar  O2 Flow (L/min): 40      TESTS & MEASUREMENTS:                        8.6    11.12 )-----------( 176      ( 21 Sep 2023 04:57 )             27.2         134<L>  |  97<L>  |  13  ----------------------------<  120<H>  5.0   |  26  |  0.5<L>    Ca    9.0      21 Sep 2023 04:57  Mg     1.5         TPro  6.0  /  Alb  2.6<L>  /  TBili  0.3  /  DBili  x   /  AST  35  /  ALT  22  /  AlkPhos  169<H>      LIVER FUNCTIONS - ( 21 Sep 2023 04:57 )  Alb: 2.6 g/dL / Pro: 6.0 g/dL / ALK PHOS: 169 U/L / ALT: 22 U/L / AST: 35 U/L / GGT: x             Culture - Sputum (collected 23 @ 12:20)  Source: Trach Asp Tracheal Aspirate  Gram Stain (23 @ 02:35):    Moderate polymorphonuclear leukocytes per low power field    Rare Squamous epithelial cells per low power field    Moderate Gram Negative Rods per oil power field    Few Gram Positive Rods per oil power field  Preliminary Report (23 @ 16:18):    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)    Normal Respiratory Sara absent  Organism: Acinetobacter baumannii/nosocom group (Carbapenem Resistant)  Acinetobacter baumannii/nosocom group (Carbapenem Resistant) (23 @ 16:18)  Organism: Acinetobacter baumannii/nosocom group (Carbapenem Resistant) (23 @ 16:18)      -  Piperacillin/Tazobactam: R      Method Type: KB  Organism: Acinetobacter baumannii/nosocom group (Carbapenem Resistant) (23 @ 16:18)      -  Levofloxacin: R >4      -  Tobramycin: R >8      -  Gentamicin: R >8      -  Cefepime: R >16      -  Ciprofloxacin: R >2      -  Imipenem: R >8      Method Type: PERLA      -  Meropenem: R >8      -  Ampicillin/Sulbactam: I 16/8      -  Ceftazidime: R >16      -  Amikacin: R >32      -  Trimethoprim/Sulfamethoxazole: R >    Culture - Urine (collected 23 @ 11:52)  Source: Clean Catch Clean Catch (Midstream)  Preliminary Report (23 @ 15:45):    Culture in progress    Culture - Blood (collected 23 @ 11:38)  Source: .Blood Blood-Peripheral  Preliminary Report (23 @ 20:00):    No growth at 72 Hours    Culture - Blood (collected 23 @ 11:38)  Source: .Blood Blood-Peripheral  Preliminary Report (23 @ 20:00):    No growth at 72 Hours      Urinalysis Basic - ( 21 Sep 2023 04:57 )    Color: x / Appearance: x / SG: x / pH: x  Gluc: 120 mg/dL / Ketone: x  / Bili: x / Urobili: x   Blood: x / Protein: x / Nitrite: x   Leuk Esterase: x / RBC: x / WBC x   Sq Epi: x / Non Sq Epi: x / Bacteria: x          RADIOLOGY & ADDITIONAL TESTS:  Personal review of radiological diagnostics performed  Echo and EKG results noted when applicable.     MEDICATIONS:  acetaminophen     Tablet .. 650 milliGRAM(s) Oral every 6 hours PRN  albuterol/ipratropium for Nebulization 3 milliLiter(s) Nebulizer every 6 hours  atorvastatin 80 milliGRAM(s) Oral at bedtime  bacitracin   Ointment 1 Application(s) Topical two times a day  brivaracetam Oral Solution 50 milliGRAM(s) Oral two times a day  cefepime   IVPB 2000 milliGRAM(s) IV Intermittent every 8 hours  chlorhexidine 0.12% Liquid 15 milliLiter(s) Oral Mucosa two times a day  chlorhexidine 2% Cloths 1 Application(s) Topical <User Schedule>  enoxaparin Injectable 40 milliGRAM(s) SubCutaneous every 24 hours  famotidine    Tablet 20 milliGRAM(s) Oral two times a day  ferrous    sulfate Liquid 300 milliGRAM(s) Enteral Tube daily  furosemide    Tablet 20 milliGRAM(s) Oral daily  latanoprost 0.005% Ophthalmic Solution 1 Drop(s) Both EYES at bedtime  levoFLOXacin IVPB 750 milliGRAM(s) IV Intermittent every 24 hours  levothyroxine 100 MICROGram(s) Oral daily  mirtazapine 15 milliGRAM(s) Oral daily  norepinephrine Infusion 0.05 MICROgram(s)/kG/Min IV Continuous <Continuous>  senna 2 Tablet(s) Oral at bedtime  sodium chloride 1 Gram(s) Oral two times a day  valproic  acid Syrup 250 milliGRAM(s) Oral three times a day      ANTIBIOTICS:  cefepime   IVPB 2000 milliGRAM(s) IV Intermittent every 8 hours  levoFLOXacin IVPB 750 milliGRAM(s) IV Intermittent every 24 hours

## 2023-09-21 NOTE — PHARMACOTHERAPY INTERVENTION NOTE - COMMENTS
Contacted micro lab to request cefiderocol, minocycline, and polymyxin B sensitivities for the MDR Acinetobacter baumannii in the 9/18 sputum culture.

## 2023-09-21 NOTE — PHARMACOTHERAPY INTERVENTION NOTE - COMMENTS
Since patient chronically bedbound, recommended rounding SCr to 0.8 mg/dL. When doing this, CrCl is ~56mL/min. Thus, recommended a reduced dose of cefiderocol 1500mg IV q8h.

## 2023-09-21 NOTE — PROGRESS NOTE ADULT - SUBJECTIVE AND OBJECTIVE BOX
Patient is a 78y old  Female who presents with a chief complaint of Hypotension (20 Sep 2023 08:04)        Over Night Events: No pressors. Remains trached on MV.        ROS:     All pertinent ROS are negative except HPI         PHYSICAL EXAM    ICU Vital Signs Last 24 Hrs  T(C): 37.2 (21 Sep 2023 08:00), Max: 37.2 (20 Sep 2023 12:00)  T(F): 98.9 (21 Sep 2023 08:00), Max: 99 (20 Sep 2023 12:00)  HR: 88 (21 Sep 2023 08:59) (77 - 106)  BP: 108/54 (21 Sep 2023 08:00) (90/51 - 130/60)  BP(mean): 78 (21 Sep 2023 08:00) (66 - 87)  RR: 24 (21 Sep 2023 08:00) (21 - 34)  SpO2: 100% (21 Sep 2023 08:59) (99% - 100%)    O2 Parameters below as of 21 Sep 2023 08:00  Patient On (Oxygen Delivery Method): tracheostomy collar  O2 Flow (L/min): 40          CONSTITUTIONAL:  NAD    ENT:   trach    EYES:   Pupils equal,    CARDIAC:   Normal rate,   Regular rhythm.      RESPIRATORY:   No wheezing  Bilateral BS  Normal chest expansion  Not tachypneic,  No use of accessory muscles    GASTROINTESTINAL:  Abdomen soft,   Non-tender,   No guarding,   + BS    MUSCULOSKELETAL:   Range of motion is not limited,  No clubbing, cyanosis    NEUROLOGICAL:   At mental baseline    SKIN:   Warm and dry    PSYCHIATRIC:   No apparent risk to self or others.          09-20-23 @ 07:01  -  09-21-23 @ 07:00  --------------------------------------------------------  IN:    Enteral Tube Flush: 100 mL    Free Water: 200 mL    IV PiggyBack: 300 mL    Jevity 1.2: 1200 mL  Total IN: 1800 mL    OUT:    Incontinent per Collection Bag (mL): 2000 mL  Total OUT: 2000 mL    Total NET: -200 mL      09-21-23 @ 07:01  -  09-21-23 @ 09:07  --------------------------------------------------------  IN:    IV PiggyBack: 100 mL    Jevity 1.2: 100 mL  Total IN: 200 mL    OUT:  Total OUT: 0 mL    Total NET: 200 mL          LABS:                            8.6    11.12 )-----------( 176      ( 21 Sep 2023 04:57 )             27.2                                               09-21    134<L>  |  97<L>  |  13  ----------------------------<  120<H>  5.0   |  26  |  0.5<L>    Ca    9.0      21 Sep 2023 04:57  Mg     1.5     09-21    TPro  6.0  /  Alb  2.6<L>  /  TBili  0.3  /  DBili  x   /  AST  35  /  ALT  22  /  AlkPhos  169<H>  09-21                                             Urinalysis Basic - ( 21 Sep 2023 04:57 )    Color: x / Appearance: x / SG: x / pH: x  Gluc: 120 mg/dL / Ketone: x  / Bili: x / Urobili: x   Blood: x / Protein: x / Nitrite: x   Leuk Esterase: x / RBC: x / WBC x   Sq Epi: x / Non Sq Epi: x / Bacteria: x                                                  LIVER FUNCTIONS - ( 21 Sep 2023 04:57 )  Alb: 2.6 g/dL / Pro: 6.0 g/dL / ALK PHOS: 169 U/L / ALT: 22 U/L / AST: 35 U/L / GGT: x                                                  Culture - Sputum (collected 18 Sep 2023 12:20)  Source: Trach Asp Tracheal Aspirate  Gram Stain (19 Sep 2023 02:35):    Moderate polymorphonuclear leukocytes per low power field    Rare Squamous epithelial cells per low power field    Moderate Gram Negative Rods per oil power field    Few Gram Positive Rods per oil power field  Preliminary Report (20 Sep 2023 16:18):    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)    Normal Respiratory Sara absent  Organism: Acinetobacter baumannii/nosocom group (Carbapenem Resistant)  Acinetobacter baumannii/nosocom group (Carbapenem Resistant) (20 Sep 2023 16:18)  Organism: Acinetobacter baumannii/nosocom group (Carbapenem Resistant) (20 Sep 2023 16:18)  Organism: Acinetobacter baumannii/nosocom group (Carbapenem Resistant) (20 Sep 2023 16:18)                                                   Mode: AC/ CMV (Assist Control/ Continuous Mandatory Ventilation)  RR (machine): 20  TV (machine): 400  FiO2: 40  PEEP: 8  ITime: 0.8  MAP: 10  PIP: 20                                      ABG - ( 20 Sep 2023 04:47 )  pH, Arterial: 7.47  pH, Blood: x     /  pCO2: 42    /  pO2: 85    / HCO3: 31    / Base Excess: 6.2   /  SaO2: 97.9                MEDICATIONS  (STANDING):  albuterol/ipratropium for Nebulization 3 milliLiter(s) Nebulizer every 6 hours  atorvastatin 80 milliGRAM(s) Oral at bedtime  bacitracin   Ointment 1 Application(s) Topical two times a day  brivaracetam Oral Solution 50 milliGRAM(s) Oral two times a day  cefepime   IVPB 2000 milliGRAM(s) IV Intermittent every 8 hours  chlorhexidine 0.12% Liquid 15 milliLiter(s) Oral Mucosa two times a day  chlorhexidine 2% Cloths 1 Application(s) Topical <User Schedule>  enoxaparin Injectable 40 milliGRAM(s) SubCutaneous every 24 hours  famotidine    Tablet 20 milliGRAM(s) Oral two times a day  ferrous    sulfate Liquid 300 milliGRAM(s) Enteral Tube daily  furosemide    Tablet 20 milliGRAM(s) Oral daily  latanoprost 0.005% Ophthalmic Solution 1 Drop(s) Both EYES at bedtime  levoFLOXacin IVPB 750 milliGRAM(s) IV Intermittent every 24 hours  levothyroxine 100 MICROGram(s) Oral daily  mirtazapine 15 milliGRAM(s) Oral daily  norepinephrine Infusion 0.05 MICROgram(s)/kG/Min (3.57 mL/Hr) IV Continuous <Continuous>  senna 2 Tablet(s) Oral at bedtime  sodium chloride 1 Gram(s) Oral two times a day  valproic  acid Syrup 250 milliGRAM(s) Oral three times a day    MEDICATIONS  (PRN):  acetaminophen     Tablet .. 650 milliGRAM(s) Oral every 6 hours PRN Temp greater or equal to 38C (100.4F), Moderate Pain (4 - 6)      New X-rays reviewed:                                                                                  ECHO    CXR interpreted by me:

## 2023-09-21 NOTE — PROGRESS NOTE ADULT - ASSESSMENT
Assessment  GEOVANY ROMERO is a 78y woman with a medical history significant for Severe COVID, seizuire disorder, pancytopenia, prior Candida auris infection who presented initially with hypotension, fever, and tachycardia, and is now in the critical care unit for septic shock due to pneumonia.       Plan  # Septic shock due to pneumonia and UTI   # Type II NSTEMI  # Hx of COPD   # Hx of acute hypoxemic respiratory failure S/P tracheostomy   # Hx of staphylococcus bacteremia, candida auris   # Hx of PEG due to oropharyngeal dysphagia   # Hypothyroidism  # Chronic Hyponatremia   # Pancytopenia, anemia  # Glaucoma  # GERD  # Seizure disorder  # Hx of CVA   # Hyperlipidemia       CNS:  Sedation: no sedation  Continue AEDs  C/W Brivaracetam  Otherwise, neuro status at baseline    HEENT:  Oral care  tracheostomy care    CVS  Levophed weaned, pt currently not on levophed  IVF boluses as tolerated for hypotension    PULMONARY  HOB @ 45 degrees, aspiration precautions  C/W PEEP to 8cm and will increase FiO2 to maintain spo2 >92%    GASTROINTESTINAL  GI prophylaxis: Pepcid BID  C/w tube feedings , dietary modified tube feedings as required  BM: on senna 2 tabs through PEG tube    GENITOURINARY/RENAL  mccauley removed, pt is voiding, no retention currently    INFECTIOUS  BCx and UCx negative  Continue empiric broad spectrum Abx: Cefepime & Levofloxacin  MRSA nares (-) hence vanc discontinued  Tracheal sputum revealed acinetobacter baumanni  F/U ID for recommendations as not sensitive to any antibiotics    HEMATOLOGIC  DVT PPX lovenox SQ  Duplex shows no signs of DVT even though D dimer is raised    ENDOCRINE  Follow up FS.  Insulin protocol as needed.  BG goal 140-180  Continue PTA levothyroxine    MSK/DERM  bedrest    Downgrade to vent unit

## 2023-09-21 NOTE — PHARMACOTHERAPY INTERVENTION NOTE - COMMENTS
Modified penicillin allergy history to state that patient tolerated cefepime during this admission.

## 2023-09-21 NOTE — PHARMACOTHERAPY INTERVENTION NOTE - COMMENTS
HISTORY OF PRESENT ILLNESS:  Abram Davis is a 23 year old female who comes in today complaining of URI   started 2 weeks ago.  Symptoms are changed over the past two weeks.  Associated symptoms include 2 days of runny nose, nasal congestion, mild sore throat and cough.  For symptom relief Abram Davis has tried over-the-counter Tylenol and Mucinex DM. Patient is 27 week pregnant.       Current Outpatient Prescriptions on File Prior to Visit:  Prenatal Vit-Fe Fumarate-FA (MULTIVITAMIN & MINERAL W/FOLIC ACID- PRENATAL) 27-1 MG Tab     No current facility-administered medications on file prior to visit.   ALLERGIES:  No Known Allergies   reports that she quit smoking about 3 years ago. Her smoking use included Cigarettes. She started smoking about 4 years ago. She quit after 1.00 year of use. She has never used smokeless tobacco.       REVIEW OF SYSTEMS:  A three point review of systems was performed.  All pertinent positives and negatives were noted in the History of Present Illness.      Ears:  Pressure and Pain in Both ears  Nose:  clear drainage  Throat:  Pain with swallowing  Cough:  Dry, nonproductive  Sinus:  No pain     Family/Social History:  not ill    Energy:  No Change  Appetite:  No Change      PHYSICAL EXAMINATION:  Visit Vitals  Pulse 87   Temp 98.9 °F (37.2 °C) (Oral)   LMP 02/02/2017 (Exact Date)   SpO2 98%     General:  Healthy, alert, in no distress  Lymphatic:  No lymphadenopathy  Eyes:  conjunctivae/sclerae normal. No erythema, edema or exudate.  Nose:  nares congested  Mouth:  Lips, mucosa, and tongue normal. Teeth and gums normal. Oropharynx clear  Throat:  1+ tonsils bilaterally  Ears:  External ears normal. Canals clear. Tympanic membranes are clear with all landmarks noted.   Sinuses:  deferred  Heart:  regular rate and rhythm and normal S1 and S2  Lungs:  lungs are clear to auscultation        ASSESSMENT:  1. Acute URI          PLAN:  No orders of the defined types were placed  in this encounter.      Discussed: Rest and Fluids. Viral illness. Recommend Sudafed as directed.     Patient to follow up with primary care provider if symptoms worsen or fail to improve.    ZAY Gomez     Patient being started on cefiderocol per Dr. Honeycutt for the treatment of MDR Acinetobacter baumannii pneumonia pending penicillin desensitization so patient can receive high-dose ampicillin-sulbactam. Recommended adding minocycline 200mg IV q12h to cefiderocol since combination therapy is preferred for the treatment of MDR Acinetobacter baumannii infections.  Patient being started on cefiderocol per Dr. Honeycutt for the treatment of MDR Acinetobacter baumannii pneumonia pending ampicillin-sulbactam test dose so patient can receive high-dose ampicillin-sulbactam. Recommended adding minocycline 200mg IV q12h to cefiderocol since combination therapy is preferred for the treatment of MDR Acinetobacter baumannii infections.

## 2023-09-21 NOTE — PHARMACOTHERAPY INTERVENTION NOTE - COMMENTS
Instead of penicillin de-sensitization, recommended giving ampicillin-sulbactam 1.5g IV infused over 30 minutes as a test dose. Recommended having diphenhydramine 50mg IVP x 1, hydrocortisone 100mg IVP x 1, and epinephrine 0.3mg IM x 1 on hand in case of an allergic reaction.

## 2023-09-21 NOTE — CONSULT NOTE ADULT - ASSESSMENT
78 year old female with past medical history of tracheostomy due to acute hypoxemic respiratory failure from COVID 2020, Hx of staph bacteremia, Candida auris, PEG tube placement due to oropharyngeal dysphagia, hypothyroidism, bradycardia, C5 compression deformity, pancytopenia, bipolar disorder, glaucoma, schizoaffective disorder, COPD, HLD, CVA, seizure disorder, GERD, S/P left hip ORIF.     Presenting from [facility] for evaluation of episodes of bradycardia tachycardia, fever, low blood pressure. found to be hypotensive in the field requiring two pushes of epinephrine.   in the ED found to be hypotensive, started on bolus of LR, right femoral line was inserted, given 1 dose of vancomycin and aztreonam.   Didn't require pressors.  last lactate 6    IMPRESSION/RECOMMENDATIONS   On presentation septic shock with lactic acidemia  Presently on vent  Possible RLL PNA secondary to CRAB  9/18 Trach : CRAB  Nares ORSA NG  9/17 BCx NG  9/17 UCx NG  -needs PCN desensitization  -for now Fetroja 2 gm iv q8h

## 2023-09-21 NOTE — PROGRESS NOTE ADULT - ASSESSMENT
GEOVANY ROMERO is a 78y woman with a medical history significant for Severe COVID, seizuire disorder, pancytopenia, prior Candida auris infection who presented initially with hypotension, fever, and tachycardia, and is now in the critical care unit for septic shock due to pneumonia.     Impression    # Septic shock due to pneumonia and UTI   # Numerous CRE Acinetobacter in the sputum  # Type II NSTEMI  # Hx of COPD   # Hx of acute hypoxemic respiratory failure S/P tracheostomy   # Hx of staphylococcus bacteremia, candida auris   # Hx of PEG due to oropharyngeal dysphagia   # Hypothyroidism  # Chronic Hyponatremia   # Pancytopenia, anemia  # Glaucoma  # GERD  # Seizure disorder  # Hx of CVA   # Hyperlipidemia     Plan:      CNS:  Sedation: no sedation  Continue AEDs, dose obtained from SNF  Otherwise, neuro status at baseline    HEENT:  Oral care, trach care.    CARDIOVASCULAR  Vasopressors: Off pressors, maintain MAP > 65    PULMONARY  HOB @ 45 degrees, aspiration precautions  Target plateau pressure < 30, driving pressure <15  Titrate FiO2 as tolerated to maintain spO2 > 92%  Long term ventilated, no role for SBT    GASTROINTESTINAL  GI prophylaxis: Pepcid BID  Feeds: TF at goal  BM: regimen PRN    GENITOURINARY/RENAL  No Davis  Monitor I&O  No acute renal injury, monitor UOP and bolus IVF as needed    INFECTIOUS  Blood and urine culture negative; CRE Acinetobacter in the DTA; follow up sensitivities; ID consult  Continue empiric broad spectrum Abx: Cefepime & Levofloxacin, target 7 day course    HEMATOLOGIC  DVT ppx: lovenox SQ    ENDOCRINE  Follow up FS.  Insulin protocol as needed.  BG goal 140-180  Continue PTA levothyroxine    MSK/DERM  bedrest      CODE STATUS: FULL  DISPO: 3 A vent unit

## 2023-09-21 NOTE — PROGRESS NOTE ADULT - SUBJECTIVE AND OBJECTIVE BOX
24H events:    Patient is a 78y old Female who presents with a chief complaint of Hypotension (21 Sep 2023 09:07)    Primary diagnosis of Sepsis       Today is hospital day 4d.      PAST MEDICAL & SURGICAL HISTORY  CVA (cerebral vascular accident)    HLD (hyperlipidemia)    COPD (chronic obstructive pulmonary disease)    Pseudoseizure    Vertigo    Schizoaffective disorder, depressive type    Anemia    Glaucoma, unspecified glaucoma type, unspecified laterality    Bipolar disorder    History of inguinal herniorrhaphy    History of ankle surgery    H/O:       SOCIAL HISTORY:  Negative for smoking/alcohol/drug use.     ALLERGIES:  Tegretol (Unknown)  penicillin (Unknown)  Keppra (Unknown)  Dilantin (Unknown)  phenobarbital (Unknown)    MEDICATIONS:  STANDING MEDICATIONS  albuterol/ipratropium for Nebulization 3 milliLiter(s) Nebulizer every 6 hours  atorvastatin 80 milliGRAM(s) Oral at bedtime  bacitracin   Ointment 1 Application(s) Topical two times a day  brivaracetam Oral Solution 50 milliGRAM(s) Oral two times a day  cefepime   IVPB 2000 milliGRAM(s) IV Intermittent every 8 hours  chlorhexidine 0.12% Liquid 15 milliLiter(s) Oral Mucosa two times a day  chlorhexidine 2% Cloths 1 Application(s) Topical <User Schedule>  enoxaparin Injectable 40 milliGRAM(s) SubCutaneous every 24 hours  famotidine    Tablet 20 milliGRAM(s) Oral two times a day  ferrous    sulfate Liquid 300 milliGRAM(s) Enteral Tube daily  furosemide    Tablet 20 milliGRAM(s) Oral daily  latanoprost 0.005% Ophthalmic Solution 1 Drop(s) Both EYES at bedtime  levoFLOXacin IVPB 750 milliGRAM(s) IV Intermittent every 24 hours  levothyroxine 100 MICROGram(s) Oral daily  mirtazapine 15 milliGRAM(s) Oral daily  norepinephrine Infusion 0.05 MICROgram(s)/kG/Min IV Continuous <Continuous>  senna 2 Tablet(s) Oral at bedtime  sodium chloride 1 Gram(s) Oral two times a day  valproic  acid Syrup 250 milliGRAM(s) Oral three times a day    PRN MEDICATIONS  acetaminophen     Tablet .. 650 milliGRAM(s) Oral every 6 hours PRN    VITALS:   T(F): 98.9  HR: 84  BP: 97/51  RR: 22  SpO2: 100%    LABS:                        8.6    11.12 )-----------( 176      ( 21 Sep 2023 04:57 )             27.2         134<L>  |  97<L>  |  13  ----------------------------<  120<H>  5.0   |  26  |  0.5<L>    Ca    9.0      21 Sep 2023 04:57  Mg     1.5         TPro  6.0  /  Alb  2.6<L>  /  TBili  0.3  /  DBili  x   /  AST  35  /  ALT  22  /  AlkPhos  169<H>        Urinalysis Basic - ( 21 Sep 2023 04:57 )    Color: x / Appearance: x / SG: x / pH: x  Gluc: 120 mg/dL / Ketone: x  / Bili: x / Urobili: x   Blood: x / Protein: x / Nitrite: x   Leuk Esterase: x / RBC: x / WBC x   Sq Epi: x / Non Sq Epi: x / Bacteria: x      ABG - ( 20 Sep 2023 04:47 )  pH, Arterial: 7.47  pH, Blood: x     /  pCO2: 42    /  pO2: 85    / HCO3: 31    / Base Excess: 6.2   /  SaO2: 97.9              Lactate, Blood: 1.9 mmol/L (23 @ 13:56)      Culture - Sputum (collected 18 Sep 2023 12:20)  Source: Trach Asp Tracheal Aspirate  Gram Stain (19 Sep 2023 02:35):    Moderate polymorphonuclear leukocytes per low power field    Rare Squamous epithelial cells per low power field    Moderate Gram Negative Rods per oil power field    Few Gram Positive Rods per oil power field  Preliminary Report (20 Sep 2023 16:18):    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)    Normal Respiratory Sara absent  Organism: Acinetobacter baumannii/nosocom group (Carbapenem Resistant)  Acinetobacter baumannii/nosocom group (Carbapenem Resistant) (20 Sep 2023 16:18)  Organism: Acinetobacter baumannii/nosocom group (Carbapenem Resistant) (20 Sep 2023 16:18)  Organism: Acinetobacter baumannii/nosocom group (Carbapenem Resistant) (20 Sep 2023 16:18)          PHYSICAL EXAM:  GENERAL: NAD  NECK: Supple, No JVD, Normal thyroid  NERVOUS SYSTEM:  Pt at baseline  CHEST/LUNG: Clear to auscultation bilaterally  HEART: Regular rate and rhythm; No murmurs, rubs, or gallops  ABDOMEN: Soft, Nontender, Nondistended; Bowel sounds present  EXTREMITIES:  + Peripheral Pulses, No clubbing, cyanosis, or edema

## 2023-09-22 LAB
-  CEFIDEROCOL: SIGNIFICANT CHANGE UP
ALBUMIN SERPL ELPH-MCNC: 2.5 G/DL — LOW (ref 3.5–5.2)
ALP SERPL-CCNC: 144 U/L — HIGH (ref 30–115)
ALT FLD-CCNC: 17 U/L — SIGNIFICANT CHANGE UP (ref 0–41)
ANION GAP SERPL CALC-SCNC: 11 MMOL/L — SIGNIFICANT CHANGE UP (ref 7–14)
AST SERPL-CCNC: 29 U/L — SIGNIFICANT CHANGE UP (ref 0–41)
BASE EXCESS BLDA CALC-SCNC: 5.6 MMOL/L — HIGH (ref -2–3)
BASOPHILS # BLD AUTO: 0.05 K/UL — SIGNIFICANT CHANGE UP (ref 0–0.2)
BASOPHILS NFR BLD AUTO: 0.5 % — SIGNIFICANT CHANGE UP (ref 0–1)
BILIRUB SERPL-MCNC: 0.2 MG/DL — SIGNIFICANT CHANGE UP (ref 0.2–1.2)
BUN SERPL-MCNC: 13 MG/DL — SIGNIFICANT CHANGE UP (ref 10–20)
CALCIUM SERPL-MCNC: 8.2 MG/DL — LOW (ref 8.4–10.5)
CHLORIDE SERPL-SCNC: 99 MMOL/L — SIGNIFICANT CHANGE UP (ref 98–110)
CO2 SERPL-SCNC: 26 MMOL/L — SIGNIFICANT CHANGE UP (ref 17–32)
CREAT SERPL-MCNC: 0.5 MG/DL — LOW (ref 0.7–1.5)
CULTURE RESULTS: SIGNIFICANT CHANGE UP
EGFR: 95 ML/MIN/1.73M2 — SIGNIFICANT CHANGE UP
EOSINOPHIL # BLD AUTO: 0.38 K/UL — SIGNIFICANT CHANGE UP (ref 0–0.7)
EOSINOPHIL NFR BLD AUTO: 3.8 % — SIGNIFICANT CHANGE UP (ref 0–8)
GAS PNL BLDA: SIGNIFICANT CHANGE UP
GLUCOSE SERPL-MCNC: 123 MG/DL — HIGH (ref 70–99)
HCO3 BLDA-SCNC: 31 MMOL/L — HIGH (ref 21–28)
HCT VFR BLD CALC: 24 % — LOW (ref 37–47)
HGB BLD-MCNC: 7.7 G/DL — LOW (ref 12–16)
HOROWITZ INDEX BLDA+IHG-RTO: 40 — SIGNIFICANT CHANGE UP
IMM GRANULOCYTES NFR BLD AUTO: 15.6 % — HIGH (ref 0.1–0.3)
LYMPHOCYTES # BLD AUTO: 2.3 K/UL — SIGNIFICANT CHANGE UP (ref 1.2–3.4)
LYMPHOCYTES # BLD AUTO: 22.7 % — SIGNIFICANT CHANGE UP (ref 20.5–51.1)
MAGNESIUM SERPL-MCNC: 2 MG/DL — SIGNIFICANT CHANGE UP (ref 1.8–2.4)
MCHC RBC-ENTMCNC: 31.8 PG — HIGH (ref 27–31)
MCHC RBC-ENTMCNC: 32.1 G/DL — SIGNIFICANT CHANGE UP (ref 32–37)
MCV RBC AUTO: 99.2 FL — HIGH (ref 81–99)
MONOCYTES # BLD AUTO: 1.28 K/UL — HIGH (ref 0.1–0.6)
MONOCYTES NFR BLD AUTO: 12.6 % — HIGH (ref 1.7–9.3)
NEUTROPHILS # BLD AUTO: 4.53 K/UL — SIGNIFICANT CHANGE UP (ref 1.4–6.5)
NEUTROPHILS NFR BLD AUTO: 44.8 % — SIGNIFICANT CHANGE UP (ref 42.2–75.2)
NRBC # BLD: 0 /100 WBCS — SIGNIFICANT CHANGE UP (ref 0–0)
ORGANISM # SPEC MICROSCOPIC CNT: SIGNIFICANT CHANGE UP
PCO2 BLDA: 45 MMHG — SIGNIFICANT CHANGE UP (ref 25–48)
PH BLDA: 7.44 — SIGNIFICANT CHANGE UP (ref 7.35–7.45)
PLATELET # BLD AUTO: 148 K/UL — SIGNIFICANT CHANGE UP (ref 130–400)
PMV BLD: 10.3 FL — SIGNIFICANT CHANGE UP (ref 7.4–10.4)
PO2 BLDA: 130 MMHG — HIGH (ref 83–108)
POTASSIUM SERPL-MCNC: 4.7 MMOL/L — SIGNIFICANT CHANGE UP (ref 3.5–5)
POTASSIUM SERPL-SCNC: 4.7 MMOL/L — SIGNIFICANT CHANGE UP (ref 3.5–5)
PROT SERPL-MCNC: 5.5 G/DL — LOW (ref 6–8)
RBC # BLD: 2.42 M/UL — LOW (ref 4.2–5.4)
RBC # FLD: 17.3 % — HIGH (ref 11.5–14.5)
SAO2 % BLDA: 99.5 % — HIGH (ref 94–98)
SODIUM SERPL-SCNC: 136 MMOL/L — SIGNIFICANT CHANGE UP (ref 135–146)
SPECIMEN SOURCE: SIGNIFICANT CHANGE UP
WBC # BLD: 10.12 K/UL — SIGNIFICANT CHANGE UP (ref 4.8–10.8)
WBC # FLD AUTO: 10.12 K/UL — SIGNIFICANT CHANGE UP (ref 4.8–10.8)

## 2023-09-22 PROCEDURE — 99232 SBSQ HOSP IP/OBS MODERATE 35: CPT | Mod: GC

## 2023-09-22 PROCEDURE — 71045 X-RAY EXAM CHEST 1 VIEW: CPT | Mod: 26

## 2023-09-22 RX ORDER — AMPICILLIN SODIUM AND SULBACTAM SODIUM 250; 125 MG/ML; MG/ML
9 INJECTION, POWDER, FOR SUSPENSION INTRAMUSCULAR; INTRAVENOUS EVERY 8 HOURS
Refills: 0 | Status: DISCONTINUED | OUTPATIENT
Start: 2023-09-22 | End: 2023-09-22

## 2023-09-22 RX ORDER — POLYETHYLENE GLYCOL 3350 17 G/17G
17 POWDER, FOR SOLUTION ORAL DAILY
Refills: 0 | Status: DISCONTINUED | OUTPATIENT
Start: 2023-09-22 | End: 2023-09-26

## 2023-09-22 RX ORDER — AMPICILLIN SODIUM AND SULBACTAM SODIUM 250; 125 MG/ML; MG/ML
3 INJECTION, POWDER, FOR SUSPENSION INTRAMUSCULAR; INTRAVENOUS EVERY 6 HOURS
Refills: 0 | Status: DISCONTINUED | OUTPATIENT
Start: 2023-09-22 | End: 2023-09-26

## 2023-09-22 RX ADMIN — Medication 20 MILLIGRAM(S): at 06:15

## 2023-09-22 RX ADMIN — Medication 1 APPLICATION(S): at 06:18

## 2023-09-22 RX ADMIN — MINOCYCLINE HYDROCHLORIDE 100 MILLIGRAM(S): 45 TABLET, EXTENDED RELEASE ORAL at 17:30

## 2023-09-22 RX ADMIN — AMPICILLIN SODIUM AND SULBACTAM SODIUM 25 GRAM(S): 250; 125 INJECTION, POWDER, FOR SUSPENSION INTRAMUSCULAR; INTRAVENOUS at 17:29

## 2023-09-22 RX ADMIN — CHLORHEXIDINE GLUCONATE 15 MILLILITER(S): 213 SOLUTION TOPICAL at 17:28

## 2023-09-22 RX ADMIN — AMPICILLIN SODIUM AND SULBACTAM SODIUM 25 GRAM(S): 250; 125 INJECTION, POWDER, FOR SUSPENSION INTRAMUSCULAR; INTRAVENOUS at 11:25

## 2023-09-22 RX ADMIN — Medication 250 MILLIGRAM(S): at 21:37

## 2023-09-22 RX ADMIN — Medication 250 MILLIGRAM(S): at 06:15

## 2023-09-22 RX ADMIN — SODIUM CHLORIDE 1 GRAM(S): 9 INJECTION INTRAMUSCULAR; INTRAVENOUS; SUBCUTANEOUS at 17:29

## 2023-09-22 RX ADMIN — CEFIDEROCOL SULFATE TOSYLATE 33.33 MILLIGRAM(S): 1 INJECTION, POWDER, FOR SOLUTION INTRAVENOUS at 00:42

## 2023-09-22 RX ADMIN — BRIVARACETAM 50 MILLIGRAM(S): 25 TABLET, FILM COATED ORAL at 17:29

## 2023-09-22 RX ADMIN — SENNA PLUS 2 TABLET(S): 8.6 TABLET ORAL at 21:36

## 2023-09-22 RX ADMIN — ENOXAPARIN SODIUM 40 MILLIGRAM(S): 100 INJECTION SUBCUTANEOUS at 08:57

## 2023-09-22 RX ADMIN — Medication 300 MILLIGRAM(S): at 11:25

## 2023-09-22 RX ADMIN — POLYETHYLENE GLYCOL 3350 17 GRAM(S): 17 POWDER, FOR SOLUTION ORAL at 11:25

## 2023-09-22 RX ADMIN — MIRTAZAPINE 15 MILLIGRAM(S): 45 TABLET, ORALLY DISINTEGRATING ORAL at 11:25

## 2023-09-22 RX ADMIN — Medication 100 MICROGRAM(S): at 06:15

## 2023-09-22 RX ADMIN — Medication 1 APPLICATION(S): at 17:28

## 2023-09-22 RX ADMIN — FAMOTIDINE 20 MILLIGRAM(S): 10 INJECTION INTRAVENOUS at 06:15

## 2023-09-22 RX ADMIN — Medication 250 MILLIGRAM(S): at 13:31

## 2023-09-22 RX ADMIN — CHLORHEXIDINE GLUCONATE 1 APPLICATION(S): 213 SOLUTION TOPICAL at 06:17

## 2023-09-22 RX ADMIN — ATORVASTATIN CALCIUM 80 MILLIGRAM(S): 80 TABLET, FILM COATED ORAL at 21:36

## 2023-09-22 RX ADMIN — SODIUM CHLORIDE 1 GRAM(S): 9 INJECTION INTRAMUSCULAR; INTRAVENOUS; SUBCUTANEOUS at 06:15

## 2023-09-22 RX ADMIN — FAMOTIDINE 20 MILLIGRAM(S): 10 INJECTION INTRAVENOUS at 17:29

## 2023-09-22 RX ADMIN — CHLORHEXIDINE GLUCONATE 15 MILLILITER(S): 213 SOLUTION TOPICAL at 06:15

## 2023-09-22 RX ADMIN — BRIVARACETAM 50 MILLIGRAM(S): 25 TABLET, FILM COATED ORAL at 06:14

## 2023-09-22 RX ADMIN — LATANOPROST 1 DROP(S): 0.05 SOLUTION/ DROPS OPHTHALMIC; TOPICAL at 21:37

## 2023-09-22 RX ADMIN — MINOCYCLINE HYDROCHLORIDE 100 MILLIGRAM(S): 45 TABLET, EXTENDED RELEASE ORAL at 06:50

## 2023-09-22 NOTE — PROGRESS NOTE ADULT - ASSESSMENT
Assessment  GEOVANY ROMERO is a 78y woman with a medical history significant for Severe COVID, seizuire disorder, pancytopenia, prior Candida auris infection who presented initially with hypotension, fever, and tachycardia, and is now in the critical care unit for septic shock due to pneumonia.       Plan  # Septic shock due to pneumonia and UTI   # Type II NSTEMI  # Hx of COPD   # Hx of acute hypoxemic respiratory failure S/P tracheostomy   # Hx of staphylococcus bacteremia, candida auris   # Hx of PEG due to oropharyngeal dysphagia   # Hypothyroidism  # Chronic Hyponatremia   # Pancytopenia, anemia  # Glaucoma  # GERD  # Seizure disorder  # Hx of CVA   # Hyperlipidemia       CNS:  Sedation: no sedation  Continue AEDs  C/W Brivaracetam  Otherwise, neuro status at baseline    HEENT:  Oral care  tracheostomy care    CVS  Levophed weaned, pt currently not on levophed  IVF boluses as tolerated for hypotension    PULMONARY  HOB @ 45 degrees, aspiration precautions  C/W PEEP to 8cm and will increase FiO2 to maintain spo2 >92%    GASTROINTESTINAL  GI prophylaxis: Pepcid BID  C/w tube feedings , dietary modified tube feedings as required  BM: on senna 2 tabs through PEG tube    GENITOURINARY/RENAL  pt is voiding, no retention currently    INFECTIOUS  BCx and UCx negative  Continue empiric broad spectrum Abx: Cefepime & Levofloxacin  MRSA nares (-) hence vanc discontinued  Tracheal sputum revealed acinetobacter baumanni  Unasyn 3g IV q6h infused over 4 hours.   Pt does not have a penicillin allergy    HEMATOLOGIC  DVT PPX lovenox SQ  Duplex shows no signs of DVT even though D dimer is raised    ENDOCRINE  Follow up FS.  Insulin protocol as needed.  BG goal 140-180  Continue PTA levothyroxine    MSK/DERM  bedrest    Downgrade to vent unit Assessment  GEOVANY ROMERO is a 78y woman with a medical history significant for Severe COVID, seizuire disorder, pancytopenia, prior Candida auris infection who presented initially with hypotension, fever, and tachycardia, and is now in the critical care unit for septic shock due to pneumonia.       Plan  # Septic shock due to pneumonia and UTI   # Type II NSTEMI  # Hx of COPD   # Hx of acute hypoxemic respiratory failure S/P tracheostomy   # Hx of staphylococcus bacteremia, candida auris   # Hx of PEG due to oropharyngeal dysphagia   # Hypothyroidism  # Chronic Hyponatremia   # Pancytopenia, anemia  # Glaucoma  # GERD  # Seizure disorder  # Hx of CVA   # Hyperlipidemia       CNS:  Sedation: no sedation  Continue AEDs  C/W Brivaracetam  Otherwise, neuro status at baseline    HEENT:  Oral care  tracheostomy care    CVS  Levophed weaned, pt currently not on levophed  IVF boluses as tolerated for hypotension    PULMONARY  HOB @ 45 degrees, aspiration precautions  C/W PEEP to 8cm and will increase FiO2 to maintain spo2 >92%    GASTROINTESTINAL  GI prophylaxis: Pepcid BID  C/w tube feedings , dietary modified tube feedings as required  BM: on senna 2 tabs through PEG tube    GENITOURINARY/RENAL  pt is voiding, no retention currently    INFECTIOUS  BCx and UCx negative  Continue empiric broad spectrum Abx: Cefepime & Levofloxacin  MRSA nares (-) hence vanc discontinued  Tracheal sputum revealed acinetobacter baumanni  Unasyn 3g IV q6h infused over 4 hours.   and minocycline IVPB 200 milliGRAM(s) IV Intermittent every 12 hours  Pt does not have a penicillin allergy    HEMATOLOGIC  DVT PPX lovenox SQ  Duplex shows no signs of DVT even though D dimer is raised    ENDOCRINE  Follow up FS.  Insulin protocol as needed.  BG goal 140-180  Continue PTA levothyroxine    MSK/DERM  bedrest    Downgrade to vent unit

## 2023-09-22 NOTE — PHARMACOTHERAPY INTERVENTION NOTE - NSPHARMCOMMASP
ASP - Lab/ test recommended
ASP - Lab/ test recommended
ASP - Therapy recommended/ Alternative therapy
ASP - Renal dose adjustment
ASP - Therapy recommended/ Alternative therapy

## 2023-09-22 NOTE — PHARMACOTHERAPY INTERVENTION NOTE - COMMENTS
Since patient chronically bedbound, recommended rounding SCr to 0.8 mg/dL. When doing this, CrCl is ~56mL/min. Thus, recommended dose adjusting high-dose ampicillin-sulbactam to 3g IV q6h infused over 4 hours.

## 2023-09-22 NOTE — PROGRESS NOTE ADULT - ASSESSMENT
GEOVANY ROMERO is a 78y woman with a medical history significant for Severe COVID, seizuire disorder, pancytopenia, prior Candida auris infection who presented initially with hypotension, fever, and tachycardia, and is now in the critical care unit for septic shock due to pneumonia.     Impression    # Septic shock due to pneumonia and UTI   # Numerous CRE Acinetobacter in the sputum  # Type II NSTEMI  # Hx of COPD   # Hx of acute hypoxemic respiratory failure S/P tracheostomy   # Hx of staphylococcus bacteremia, candida auris   # Hx of PEG due to oropharyngeal dysphagia   # Hypothyroidism  # Chronic Hyponatremia   # Pancytopenia, anemia  # Glaucoma  # GERD  # Seizure disorder  # Hx of CVA   # Hyperlipidemia     Plan:      CNS:  Sedation: no sedation  Continue AEDs, dose obtained from [facility]  Otherwise, neuro status at baseline    HEENT:  Oral care, trach care.    CARDIOVASCULAR  Vasopressors: Off pressors, maintain MAP > 65    PULMONARY  HOB @ 45 degrees, aspiration precautions  Target plateau pressure < 30, driving pressure <15  Titrate FiO2 as tolerated to maintain spO2 > 92%  Long term ventilated, no role for SBT    GASTROINTESTINAL  GI prophylaxis: Pepcid BID  Feeds: TF at goal  BM: regimen PRN, increase slightly    GENITOURINARY/RENAL  No Davis  Monitor I&O  No acute renal injury, monitor UOP and bolus IVF as needed    INFECTIOUS  Blood and urine culture negative; CRE Acinetobacter in the DTA; follow up sensitivities; ID consult  Continue empiric broad spectrum Abx: Unasyn & Minocycline, course per ID      HEMATOLOGIC  DVT ppx: lovenox SQ    ENDOCRINE  Follow up FS.  Insulin protocol as needed.  BG goal 140-180  Continue PTA levothyroxine    MSK/DERM  bedrest      CODE STATUS: FULL  DISPO: 3 A vent unit

## 2023-09-22 NOTE — PHARMACOTHERAPY INTERVENTION NOTE - COMMENTS
Modified penicillin allergy history to state that patient tolerated ampicillin-sulbactam during this admission.

## 2023-09-22 NOTE — PROGRESS NOTE ADULT - SUBJECTIVE AND OBJECTIVE BOX
24H events:    Patient is a 78y old Female who presents with a chief complaint of Hypotension (22 Sep 2023 08:01)    Primary diagnosis of Sepsis       Today is hospital day 5d.      PAST MEDICAL & SURGICAL HISTORY  CVA (cerebral vascular accident)    HLD (hyperlipidemia)    COPD (chronic obstructive pulmonary disease)    Pseudoseizure    Vertigo    Schizoaffective disorder, depressive type    Anemia    Glaucoma, unspecified glaucoma type, unspecified laterality    Bipolar disorder    History of inguinal herniorrhaphy    History of ankle surgery    H/O:       SOCIAL HISTORY:  Negative for smoking/alcohol/drug use.     ALLERGIES:  Tegretol (Unknown)  penicillin (Unknown)  Keppra (Unknown)  Dilantin (Unknown)  phenobarbital (Unknown)    MEDICATIONS:  STANDING MEDICATIONS  albuterol/ipratropium for Nebulization 3 milliLiter(s) Nebulizer every 6 hours  ampicillin/sulbactam  IVPB 3 Gram(s) IV Intermittent every 6 hours  atorvastatin 80 milliGRAM(s) Oral at bedtime  bacitracin   Ointment 1 Application(s) Topical two times a day  brivaracetam Oral Solution 50 milliGRAM(s) Oral two times a day  chlorhexidine 0.12% Liquid 15 milliLiter(s) Oral Mucosa two times a day  chlorhexidine 2% Cloths 1 Application(s) Topical <User Schedule>  enoxaparin Injectable 40 milliGRAM(s) SubCutaneous every 24 hours  famotidine    Tablet 20 milliGRAM(s) Oral two times a day  ferrous    sulfate Liquid 300 milliGRAM(s) Enteral Tube daily  furosemide    Tablet 20 milliGRAM(s) Oral daily  latanoprost 0.005% Ophthalmic Solution 1 Drop(s) Both EYES at bedtime  levothyroxine 100 MICROGram(s) Oral daily  minocycline IVPB      minocycline IVPB 200 milliGRAM(s) IV Intermittent every 12 hours  mirtazapine 15 milliGRAM(s) Oral daily  polyethylene glycol 3350 17 Gram(s) Oral daily  senna 2 Tablet(s) Oral at bedtime  sodium chloride 1 Gram(s) Oral two times a day  valproic  acid Syrup 250 milliGRAM(s) Oral three times a day    PRN MEDICATIONS  acetaminophen     Tablet .. 650 milliGRAM(s) Oral every 6 hours PRN  diphenhydrAMINE Injectable 50 milliGRAM(s) IV Push once PRN  EPINEPHrine     1 mG/mL Injectable 0.3 milliGRAM(s) IntraMuscular once PRN  hydrocortisone sodium succinate Injectable 100 milliGRAM(s) IV Push once PRN    VITALS:   T(F): 97.9  HR: 83  BP: 97/49  RR: 20  SpO2: 99%    LABS:                        7.7    10.12 )-----------( 148      ( 22 Sep 2023 04:44 )             24.0         136  |  99  |  13  ----------------------------<  123<H>  4.7   |  26  |  0.5<L>    Ca    8.2<L>      22 Sep 2023 04:44  Mg     2.0         TPro  5.5<L>  /  Alb  2.5<L>  /  TBili  0.2  /  DBili  x   /  AST  29  /  ALT  17  /  AlkPhos  144<H>        Urinalysis Basic - ( 22 Sep 2023 04:44 )    Color: x / Appearance: x / SG: x / pH: x  Gluc: 123 mg/dL / Ketone: x  / Bili: x / Urobili: x   Blood: x / Protein: x / Nitrite: x   Leuk Esterase: x / RBC: x / WBC x   Sq Epi: x / Non Sq Epi: x / Bacteria: x      ABG - ( 22 Sep 2023 04:12 )  pH, Arterial: 7.44  pH, Blood: x     /  pCO2: 45    /  pO2: 130   / HCO3: 31    / Base Excess: 5.6   /  SaO2: 99.5              PHYSICAL EXAM:  GENERAL: NAD  NECK: Supple, No JVD, Normal thyroid  NERVOUS SYSTEM:  PT at baseline, Trached  CHEST/LUNG: Clear to auscultation bilaterally; No rales, rhonchi, wheezing, or rubs  HEART: Regular rate and rhythm; No murmurs, rubs, or gallops  ABDOMEN: Soft, Nontender, Nondistended; Bowel sounds present  EXTREMITIES:  + Peripheral Pulses, edema present

## 2023-09-22 NOTE — PROGRESS NOTE ADULT - SUBJECTIVE AND OBJECTIVE BOX
GEOVANY ROEMRO  78y, Female    All available historical data reviewed    OVERNIGHT EVENTS:  fio2 40%  does not follow commands  no pressors    ROS:  unable to obtain history secondary to patient's mental status     VITALS:  T(F): 97.8, Max: 97.9 (09-22-23 @ 08:00)  HR: 81  BP: 99/51  RR: 23Vital Signs Last 24 Hrs  T(C): 36.6 (22 Sep 2023 12:00), Max: 36.6 (21 Sep 2023 16:00)  T(F): 97.8 (22 Sep 2023 12:00), Max: 97.9 (22 Sep 2023 08:00)  HR: 81 (22 Sep 2023 15:00) (75 - 97)  BP: 99/51 (22 Sep 2023 15:00) (97/49 - 156/67)  BP(mean): 71 (22 Sep 2023 15:00) (70 - 99)  RR: 23 (22 Sep 2023 15:00) (19 - 37)  SpO2: 100% (22 Sep 2023 15:00) (98% - 100%)    Parameters below as of 22 Sep 2023 15:00  Patient On (Oxygen Delivery Method): ventilator    O2 Concentration (%): 40    TESTS & MEASUREMENTS:                        7.7    10.12 )-----------( 148      ( 22 Sep 2023 04:44 )             24.0     09-22    136  |  99  |  13  ----------------------------<  123<H>  4.7   |  26  |  0.5<L>    Ca    8.2<L>      22 Sep 2023 04:44  Mg     2.0     09-22    TPro  5.5<L>  /  Alb  2.5<L>  /  TBili  0.2  /  DBili  x   /  AST  29  /  ALT  17  /  AlkPhos  144<H>  09-22    LIVER FUNCTIONS - ( 22 Sep 2023 04:44 )  Alb: 2.5 g/dL / Pro: 5.5 g/dL / ALK PHOS: 144 U/L / ALT: 17 U/L / AST: 29 U/L / GGT: x             Culture - Sputum (collected 09-18-23 @ 12:20)  Source: Trach Asp Tracheal Aspirate  Gram Stain (09-19-23 @ 02:35):    Moderate polymorphonuclear leukocytes per low power field    Rare Squamous epithelial cells per low power field    Moderate Gram Negative Rods per oil power field    Few Gram Positive Rods per oil power field  Preliminary Report (09-20-23 @ 16:18):    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)    Normal Respiratory Sara absent  Organism: Acinetobacter baumannii/nosocom group (Carbapenem Resistant)  Acinetobacter baumannii/nosocom group (Carbapenem Resistant) (09-20-23 @ 16:18)  Organism: Acinetobacter baumannii/nosocom group (Carbapenem Resistant) (09-20-23 @ 16:18)      -  Piperacillin/Tazobactam: R      Method Type: KB  Organism: Acinetobacter baumannii/nosocom group (Carbapenem Resistant) (09-20-23 @ 16:18)      -  Levofloxacin: R >4      -  Tobramycin: R >8      -  Gentamicin: R >8      -  Cefepime: R >16      -  Minocycline: I 8      -  Ciprofloxacin: R >2      -  Imipenem: R >8      Method Type: PERLA      -  Meropenem: R >8      -  Ampicillin/Sulbactam: I 16/8      -  Ceftazidime: R >16      -  Amikacin: R >32      -  Colostin: I 0.25      -  Polymyxin B: I 0.5      -  Trimethoprim/Sulfamethoxazole: R >2/38    Culture - Urine (collected 09-17-23 @ 11:52)  Source: Clean Catch Clean Catch (Midstream)  Preliminary Report (09-20-23 @ 15:45):    Culture in progress    Culture - Blood (collected 09-17-23 @ 11:38)  Source: .Blood Blood-Peripheral  Preliminary Report (09-21-23 @ 20:01):    No growth at 4 days    Culture - Blood (collected 09-17-23 @ 11:38)  Source: .Blood Blood-Peripheral  Preliminary Report (09-21-23 @ 20:01):    No growth at 4 days      Urinalysis Basic - ( 22 Sep 2023 04:44 )    Color: x / Appearance: x / SG: x / pH: x  Gluc: 123 mg/dL / Ketone: x  / Bili: x / Urobili: x   Blood: x / Protein: x / Nitrite: x   Leuk Esterase: x / RBC: x / WBC x   Sq Epi: x / Non Sq Epi: x / Bacteria: x          RADIOLOGY & ADDITIONAL TESTS:  Personal review of radiological diagnostics performed  Echo and EKG results noted when applicable.     MEDICATIONS:  acetaminophen     Tablet .. 650 milliGRAM(s) Oral every 6 hours PRN  albuterol/ipratropium for Nebulization 3 milliLiter(s) Nebulizer every 6 hours  ampicillin/sulbactam  IVPB 3 Gram(s) IV Intermittent every 6 hours  atorvastatin 80 milliGRAM(s) Oral at bedtime  bacitracin   Ointment 1 Application(s) Topical two times a day  brivaracetam Oral Solution 50 milliGRAM(s) Oral two times a day  chlorhexidine 0.12% Liquid 15 milliLiter(s) Oral Mucosa two times a day  chlorhexidine 2% Cloths 1 Application(s) Topical <User Schedule>  diphenhydrAMINE Injectable 50 milliGRAM(s) IV Push once PRN  enoxaparin Injectable 40 milliGRAM(s) SubCutaneous every 24 hours  EPINEPHrine     1 mG/mL Injectable 0.3 milliGRAM(s) IntraMuscular once PRN  famotidine    Tablet 20 milliGRAM(s) Oral two times a day  ferrous    sulfate Liquid 300 milliGRAM(s) Enteral Tube daily  furosemide    Tablet 20 milliGRAM(s) Oral daily  hydrocortisone sodium succinate Injectable 100 milliGRAM(s) IV Push once PRN  latanoprost 0.005% Ophthalmic Solution 1 Drop(s) Both EYES at bedtime  levothyroxine 100 MICROGram(s) Oral daily  minocycline IVPB      minocycline IVPB 200 milliGRAM(s) IV Intermittent every 12 hours  mirtazapine 15 milliGRAM(s) Oral daily  polyethylene glycol 3350 17 Gram(s) Oral daily  senna 2 Tablet(s) Oral at bedtime  sodium chloride 1 Gram(s) Oral two times a day  valproic  acid Syrup 250 milliGRAM(s) Oral three times a day      ANTIBIOTICS:  ampicillin/sulbactam  IVPB 3 Gram(s) IV Intermittent every 6 hours  minocycline IVPB      minocycline IVPB 200 milliGRAM(s) IV Intermittent every 12 hours

## 2023-09-22 NOTE — PROGRESS NOTE ADULT - SUBJECTIVE AND OBJECTIVE BOX
Patient is a 78y old  Female who presents with a chief complaint of Hypotension (21 Sep 2023 12:56)        Over Night Events:    Ampicillin given yesterday without allergic reaction  Ptaient to transfer to vent unit  no acute events    ROS:     Unable to assess ROS: patient trached.        PHYSICAL EXAM    ICU Vital Signs Last 24 Hrs  T(C): 36.5 (22 Sep 2023 04:00), Max: 36.7 (21 Sep 2023 12:00)  T(F): 97.7 (22 Sep 2023 04:00), Max: 98.1 (21 Sep 2023 12:00)  HR: 81 (22 Sep 2023 07:00) (76 - 97)  BP: 109/55 (22 Sep 2023 07:00) (95/51 - 156/67)  BP(mean): 79 (22 Sep 2023 07:00) (70 - 97)  ABP: --  ABP(mean): --  RR: 20 (22 Sep 2023 07:00) (19 - 37)  SpO2: 100% (22 Sep 2023 07:00) (98% - 100%)    O2 Parameters below as of 22 Sep 2023 06:00  Patient On (Oxygen Delivery Method): tracheostomy collar    O2 Concentration (%): 40      General: ill looking  HEENT: trach  Lungs: dec bs   Cardiovascular: Regular   Abdomen: Soft  Neuro: not following commands      09-21-23 @ 07:01  -  09-22-23 @ 07:00  --------------------------------------------------------  IN:    Free Water: 100 mL    IV PiggyBack: 700 mL    Jevity 1.2: 1000 mL  Total IN: 1800 mL    OUT:    Incontinent per Collection Bag (mL): 1450 mL  Total OUT: 1450 mL    Total NET: 350 mL          LABS:                            7.7    10.12 )-----------( 148      ( 22 Sep 2023 04:44 )             24.0                                               09-22    136  |  99  |  13  ----------------------------<  123<H>  4.7   |  26  |  0.5<L>    Ca    8.2<L>      22 Sep 2023 04:44  Mg     2.0     09-22    TPro  5.5<L>  /  Alb  2.5<L>  /  TBili  0.2  /  DBili  x   /  AST  29  /  ALT  17  /  AlkPhos  144<H>  09-22                                             Urinalysis Basic - ( 22 Sep 2023 04:44 )    Color: x / Appearance: x / SG: x / pH: x  Gluc: 123 mg/dL / Ketone: x  / Bili: x / Urobili: x   Blood: x / Protein: x / Nitrite: x   Leuk Esterase: x / RBC: x / WBC x   Sq Epi: x / Non Sq Epi: x / Bacteria: x                                                  LIVER FUNCTIONS - ( 22 Sep 2023 04:44 )  Alb: 2.5 g/dL / Pro: 5.5 g/dL / ALK PHOS: 144 U/L / ALT: 17 U/L / AST: 29 U/L / GGT: x                                                                                               Mode: AC/ CMV (Assist Control/ Continuous Mandatory Ventilation)  RR (machine): 20  TV (machine): 400  FiO2: 40  PEEP: 8  ITime: 0.8  MAP: 10  PIP: 18                                      ABG - ( 22 Sep 2023 04:12 )  pH, Arterial: 7.44  pH, Blood: x     /  pCO2: 45    /  pO2: 130   / HCO3: 31    / Base Excess: 5.6   /  SaO2: 99.5                MEDICATIONS  (STANDING):  albuterol/ipratropium for Nebulization 3 milliLiter(s) Nebulizer every 6 hours  atorvastatin 80 milliGRAM(s) Oral at bedtime  bacitracin   Ointment 1 Application(s) Topical two times a day  brivaracetam Oral Solution 50 milliGRAM(s) Oral two times a day  cefiderocol IVPB 1500 milliGRAM(s) IV Intermittent every 8 hours  chlorhexidine 0.12% Liquid 15 milliLiter(s) Oral Mucosa two times a day  chlorhexidine 2% Cloths 1 Application(s) Topical <User Schedule>  enoxaparin Injectable 40 milliGRAM(s) SubCutaneous every 24 hours  famotidine    Tablet 20 milliGRAM(s) Oral two times a day  ferrous    sulfate Liquid 300 milliGRAM(s) Enteral Tube daily  furosemide    Tablet 20 milliGRAM(s) Oral daily  latanoprost 0.005% Ophthalmic Solution 1 Drop(s) Both EYES at bedtime  levothyroxine 100 MICROGram(s) Oral daily  minocycline IVPB      minocycline IVPB 200 milliGRAM(s) IV Intermittent every 12 hours  mirtazapine 15 milliGRAM(s) Oral daily  norepinephrine Infusion 0.05 MICROgram(s)/kG/Min (3.57 mL/Hr) IV Continuous <Continuous>  senna 2 Tablet(s) Oral at bedtime  sodium chloride 1 Gram(s) Oral two times a day  valproic  acid Syrup 250 milliGRAM(s) Oral three times a day    MEDICATIONS  (PRN):  acetaminophen     Tablet .. 650 milliGRAM(s) Oral every 6 hours PRN Temp greater or equal to 38C (100.4F), Moderate Pain (4 - 6)  diphenhydrAMINE Injectable 50 milliGRAM(s) IV Push once PRN in case of allergic reaction to ampicillin-sulbactam  EPINEPHrine     1 mG/mL Injectable 0.3 milliGRAM(s) IntraMuscular once PRN in case of allergic reaction to ampicillin-sulbactam  hydrocortisone sodium succinate Injectable 100 milliGRAM(s) IV Push once PRN in case of allergic reaction to ampicillin-sulbactam      New X-rays reviewed:       ECHO reviewed    CXR interpreted by me:    9/22  images and radiologist read reviewed, by my read demonstrating stable RLL infiltrate

## 2023-09-22 NOTE — PROGRESS NOTE ADULT - ASSESSMENT
· Assessment	  78 year old female with past medical history of tracheostomy due to acute hypoxemic respiratory failure from COVID 2020, Hx of staph bacteremia, Candida auris, PEG tube placement due to oropharyngeal dysphagia, hypothyroidism, bradycardia, C5 compression deformity, pancytopenia, bipolar disorder, glaucoma, schizoaffective disorder, COPD, HLD, CVA, seizure disorder, GERD, S/P left hip ORIF.     Presenting from [facility] for evaluation of episodes of bradycardia tachycardia, fever, low blood pressure. found to be hypotensive in the field requiring two pushes of epinephrine.   in the ED found to be hypotensive, started on bolus of LR, right femoral line was inserted, given 1 dose of vancomycin and aztreonam.   Didn't require pressors.    IMPRESSION/RECOMMENDATIONS   On presentation septic shock with lactic acidemia  Presently on vent  Possible RLL PNA secondary to CRAB  9/18 Trach : CRAB  Nares ORSA NG  9/17 BCx NG  9/17 UCx NG    -Unasyn 3 gm iv q6h over 4h  -Minocycline 200 mg iv q12h  -Off loading to prevent pressure sores and preventive measures to avoid aspiration

## 2023-09-23 PROCEDURE — 99233 SBSQ HOSP IP/OBS HIGH 50: CPT

## 2023-09-23 RX ADMIN — CHLORHEXIDINE GLUCONATE 15 MILLILITER(S): 213 SOLUTION TOPICAL at 05:38

## 2023-09-23 RX ADMIN — AMPICILLIN SODIUM AND SULBACTAM SODIUM 25 GRAM(S): 250; 125 INJECTION, POWDER, FOR SUSPENSION INTRAMUSCULAR; INTRAVENOUS at 13:02

## 2023-09-23 RX ADMIN — Medication 300 MILLIGRAM(S): at 13:02

## 2023-09-23 RX ADMIN — LATANOPROST 1 DROP(S): 0.05 SOLUTION/ DROPS OPHTHALMIC; TOPICAL at 21:19

## 2023-09-23 RX ADMIN — BRIVARACETAM 50 MILLIGRAM(S): 25 TABLET, FILM COATED ORAL at 06:04

## 2023-09-23 RX ADMIN — MIRTAZAPINE 15 MILLIGRAM(S): 45 TABLET, ORALLY DISINTEGRATING ORAL at 13:02

## 2023-09-23 RX ADMIN — ENOXAPARIN SODIUM 40 MILLIGRAM(S): 100 INJECTION SUBCUTANEOUS at 09:13

## 2023-09-23 RX ADMIN — AMPICILLIN SODIUM AND SULBACTAM SODIUM 25 GRAM(S): 250; 125 INJECTION, POWDER, FOR SUSPENSION INTRAMUSCULAR; INTRAVENOUS at 17:36

## 2023-09-23 RX ADMIN — AMPICILLIN SODIUM AND SULBACTAM SODIUM 25 GRAM(S): 250; 125 INJECTION, POWDER, FOR SUSPENSION INTRAMUSCULAR; INTRAVENOUS at 23:49

## 2023-09-23 RX ADMIN — FAMOTIDINE 20 MILLIGRAM(S): 10 INJECTION INTRAVENOUS at 05:38

## 2023-09-23 RX ADMIN — CHLORHEXIDINE GLUCONATE 1 APPLICATION(S): 213 SOLUTION TOPICAL at 06:04

## 2023-09-23 RX ADMIN — BRIVARACETAM 50 MILLIGRAM(S): 25 TABLET, FILM COATED ORAL at 18:31

## 2023-09-23 RX ADMIN — Medication 20 MILLIGRAM(S): at 05:38

## 2023-09-23 RX ADMIN — SODIUM CHLORIDE 1 GRAM(S): 9 INJECTION INTRAMUSCULAR; INTRAVENOUS; SUBCUTANEOUS at 18:31

## 2023-09-23 RX ADMIN — MINOCYCLINE HYDROCHLORIDE 100 MILLIGRAM(S): 45 TABLET, EXTENDED RELEASE ORAL at 18:32

## 2023-09-23 RX ADMIN — AMPICILLIN SODIUM AND SULBACTAM SODIUM 25 GRAM(S): 250; 125 INJECTION, POWDER, FOR SUSPENSION INTRAMUSCULAR; INTRAVENOUS at 05:38

## 2023-09-23 RX ADMIN — FAMOTIDINE 20 MILLIGRAM(S): 10 INJECTION INTRAVENOUS at 17:37

## 2023-09-23 RX ADMIN — POLYETHYLENE GLYCOL 3350 17 GRAM(S): 17 POWDER, FOR SOLUTION ORAL at 13:05

## 2023-09-23 RX ADMIN — AMPICILLIN SODIUM AND SULBACTAM SODIUM 25 GRAM(S): 250; 125 INJECTION, POWDER, FOR SUSPENSION INTRAMUSCULAR; INTRAVENOUS at 00:00

## 2023-09-23 RX ADMIN — Medication 1 APPLICATION(S): at 06:05

## 2023-09-23 RX ADMIN — SODIUM CHLORIDE 1 GRAM(S): 9 INJECTION INTRAMUSCULAR; INTRAVENOUS; SUBCUTANEOUS at 05:38

## 2023-09-23 RX ADMIN — SENNA PLUS 2 TABLET(S): 8.6 TABLET ORAL at 21:18

## 2023-09-23 RX ADMIN — MINOCYCLINE HYDROCHLORIDE 100 MILLIGRAM(S): 45 TABLET, EXTENDED RELEASE ORAL at 06:44

## 2023-09-23 RX ADMIN — Medication 1 APPLICATION(S): at 17:37

## 2023-09-23 RX ADMIN — Medication 250 MILLIGRAM(S): at 05:38

## 2023-09-23 RX ADMIN — Medication 250 MILLIGRAM(S): at 21:17

## 2023-09-23 RX ADMIN — Medication 100 MICROGRAM(S): at 05:38

## 2023-09-23 RX ADMIN — CHLORHEXIDINE GLUCONATE 15 MILLILITER(S): 213 SOLUTION TOPICAL at 17:36

## 2023-09-23 RX ADMIN — Medication 250 MILLIGRAM(S): at 13:25

## 2023-09-23 RX ADMIN — ATORVASTATIN CALCIUM 80 MILLIGRAM(S): 80 TABLET, FILM COATED ORAL at 21:17

## 2023-09-23 NOTE — PROGRESS NOTE ADULT - ASSESSMENT
# Septic shock due to pneumonia and UTI   # Type II NSTEMI  # Hx of COPD   # Hx of acute hypoxemic respiratory failure S/P tracheostomy   # Hx of staphylococcus bacteremia, candida auris   # Hx of PEG due to oropharyngeal dysphagia   # Hypothyroidism  # Chronic Hyponatremia   # Pancytopenia, anemia  # Glaucoma  # GERD  # Seizure disorder  # Hx of CVA   # Hyperlipidemia       CNS:  Sedation: no sedation  Continue AEDs  C/W Brivaracetam  Otherwise, neuro status at baseline    HEENT:  Oral care  tracheostomy care    CVS  Levophed weaned, pt currently not on levophed  IVF boluses as tolerated for hypotension    PULMONARY  HOB @ 45 degrees, aspiration precautions  C/W PEEP to 8cm and will increase FiO2 to maintain spo2 >92%    GASTROINTESTINAL  GI prophylaxis: Pepcid BID  C/w tube feedings , dietary modified tube feedings as required  BM: on senna 2 tabs through PEG tube    GENITOURINARY/RENAL  pt is voiding, no retention currently    INFECTIOUS  BCx and UCx negative  Continue empiric broad spectrum Abx: Cefepime & Levofloxacin  MRSA nares (-) hence vanc discontinued  Tracheal sputum revealed acinetobacter baumanni  Unasyn 3g IV q6h infused over 4 hours.   and minocycline IVPB 200 milliGRAM(s) IV Intermittent every 12 hours  Pt does not have a penicillin allergy    HEMATOLOGIC  DVT PPX lovenox SQ  Duplex shows no signs of DVT even though D dimer is raised    ENDOCRINE  Follow up FS.  Insulin protocol as needed.  BG goal 140-180  Continue PTA levothyroxine    MSK/DERM  bedrest    Downgrade to vent unit

## 2023-09-23 NOTE — PROGRESS NOTE ADULT - ASSESSMENT
GEOVANY ROMERO is a 78y woman with a medical history significant for Severe COVID, seizuire disorder, pancytopenia, prior Candida auris infection who presented initially with hypotension, fever, and tachycardia, and is now in the critical care unit for septic shock due to pneumonia.     Impression    # Septic shock due to pneumonia and UTI   # Numerous CRE Acinetobacter in the sputum  # Type II NSTEMI  # Hx of COPD   # Hx of acute hypoxemic respiratory failure S/P tracheostomy   # Hx of staphylococcus bacteremia, candida auris   # Hx of PEG due to oropharyngeal dysphagia   # Hypothyroidism  # Chronic Hyponatremia   # Pancytopenia, anemia  # Glaucoma  # GERD  # Seizure disorder  # Hx of CVA   # Hyperlipidemia     Plan:      CNS: Continue AEDs, dose obtained from SNF    HEENT: Oral care, trach care.    CARDIOVASCULAR: Off pressors, maintain MAP > 65    PULMONARY: HOB @ 45 degrees, aspiration precautions; Long term ventilated, no role for SBT    GASTROINTESTINAL: GI prophylaxis:; Tube feeds. Bowel regimen PRN.     GENITOURINARY/RENAL: Replete electrolytes. Kidney function at baseline. No mccauley.     INFECTIOUS: Blood and urine culture negative; CRE Acinetobacter in the DTA; follow up sensitivities; Abx course per ID.     HEMATOLOGIC: DVT ppx: lovenox SQ    ENDOCRINE: Follow up FS.  Insulin protocol as needed.  BG goal 140-180. Continue Levothyroxine    MSK/DERM: bedrest    CODE STATUS: FULL    DISPO: 3 A vent unit

## 2023-09-23 NOTE — PROGRESS NOTE ADULT - SUBJECTIVE AND OBJECTIVE BOX
Patient is a 78y old  Female who presents with a chief complaint of Hypotension (23 Sep 2023 04:03)        Over Night Events:    No events   Not on any drips       ROS:  See HPI    PHYSICAL EXAM    ICU Vital Signs Last 24 Hrs  T(C): 35.8 (23 Sep 2023 08:00), Max: 36.7 (22 Sep 2023 16:00)  T(F): 96.4 (23 Sep 2023 08:00), Max: 98 (22 Sep 2023 16:00)  HR: 92 (23 Sep 2023 08:00) (75 - 94)  BP: 103/55 (23 Sep 2023 08:00) (96/55 - 140/64)  BP(mean): 76 (23 Sep 2023 08:00) (70 - 92)  ABP: --  ABP(mean): --  RR: 24 (23 Sep 2023 08:00) (20 - 28)  SpO2: 98% (23 Sep 2023 08:00) (92% - 100%)    O2 Parameters below as of 23 Sep 2023 08:00  Patient On (Oxygen Delivery Method): ventilator    O2 Concentration (%): 40        General: trach and peg   HEENT: MARY             Lymphatic system: No cervical LN   Lungs: Bilateral BS  Cardiovascular: Regular   Gastrointestinal: Soft, Positive BS  Extremities: No clubbing.  No cyanosis.   Skin: Warm, intact  Neurological: At baseline      09-22-23 @ 07:01  -  09-23-23 @ 07:00  --------------------------------------------------------  IN:    Enteral Tube Flush: 200 mL    Free Water: 100 mL    IV PiggyBack: 550 mL    Jevity 1.2: 1200 mL  Total IN: 2050 mL    OUT:    Incontinent per Collection Bag (mL): 1500 mL    Voided (mL): 600 mL  Total OUT: 2100 mL    Total NET: -50 mL          LABS:                            7.7    10.12 )-----------( 148      ( 22 Sep 2023 04:44 )             24.0                                               09-22    136  |  99  |  13  ----------------------------<  123<H>  4.7   |  26  |  0.5<L>    Ca    8.2<L>      22 Sep 2023 04:44  Mg     2.0     09-22    TPro  5.5<L>  /  Alb  2.5<L>  /  TBili  0.2  /  DBili  x   /  AST  29  /  ALT  17  /  AlkPhos  144<H>  09-22                                             Urinalysis Basic - ( 22 Sep 2023 04:44 )    Color: x / Appearance: x / SG: x / pH: x  Gluc: 123 mg/dL / Ketone: x  / Bili: x / Urobili: x   Blood: x / Protein: x / Nitrite: x   Leuk Esterase: x / RBC: x / WBC x   Sq Epi: x / Non Sq Epi: x / Bacteria: x                                                  LIVER FUNCTIONS - ( 22 Sep 2023 04:44 )  Alb: 2.5 g/dL / Pro: 5.5 g/dL / ALK PHOS: 144 U/L / ALT: 17 U/L / AST: 29 U/L / GGT: x                                                                                               Mode: AC/ CMV (Assist Control/ Continuous Mandatory Ventilation)  RR (machine): 20  TV (machine): 400  FiO2: 40  PEEP: 8  ITime: 0.8  MAP: 16  PIP: 31                                      ABG - ( 22 Sep 2023 04:12 )  pH, Arterial: 7.44  pH, Blood: x     /  pCO2: 45    /  pO2: 130   / HCO3: 31    / Base Excess: 5.6   /  SaO2: 99.5                MEDICATIONS  (STANDING):  albuterol/ipratropium for Nebulization 3 milliLiter(s) Nebulizer every 6 hours  ampicillin/sulbactam  IVPB 3 Gram(s) IV Intermittent every 6 hours  atorvastatin 80 milliGRAM(s) Oral at bedtime  bacitracin   Ointment 1 Application(s) Topical two times a day  brivaracetam Oral Solution 50 milliGRAM(s) Oral two times a day  chlorhexidine 0.12% Liquid 15 milliLiter(s) Oral Mucosa two times a day  chlorhexidine 2% Cloths 1 Application(s) Topical <User Schedule>  enoxaparin Injectable 40 milliGRAM(s) SubCutaneous every 24 hours  famotidine    Tablet 20 milliGRAM(s) Oral two times a day  ferrous    sulfate Liquid 300 milliGRAM(s) Enteral Tube daily  furosemide    Tablet 20 milliGRAM(s) Oral daily  latanoprost 0.005% Ophthalmic Solution 1 Drop(s) Both EYES at bedtime  levothyroxine 100 MICROGram(s) Oral daily  minocycline IVPB 200 milliGRAM(s) IV Intermittent every 12 hours  minocycline IVPB      mirtazapine 15 milliGRAM(s) Oral daily  polyethylene glycol 3350 17 Gram(s) Oral daily  senna 2 Tablet(s) Oral at bedtime  sodium chloride 1 Gram(s) Oral two times a day  valproic  acid Syrup 250 milliGRAM(s) Oral three times a day    MEDICATIONS  (PRN):  acetaminophen     Tablet .. 650 milliGRAM(s) Oral every 6 hours PRN Temp greater or equal to 38C (100.4F), Moderate Pain (4 - 6)  diphenhydrAMINE Injectable 50 milliGRAM(s) IV Push once PRN in case of allergic reaction to ampicillin-sulbactam  EPINEPHrine     1 mG/mL Injectable 0.3 milliGRAM(s) IntraMuscular once PRN in case of allergic reaction to ampicillin-sulbactam  hydrocortisone sodium succinate Injectable 100 milliGRAM(s) IV Push once PRN in case of allergic reaction to ampicillin-sulbactam      Xrays:                                                                                     ECHO

## 2023-09-23 NOTE — PROGRESS NOTE ADULT - SUBJECTIVE AND OBJECTIVE BOX
24H events:    Patient is a 78y old Female who presents with a chief complaint of Hypotension (22 Sep 2023 15:20)    Primary diagnosis of Sepsis    Today is hospital day 6d. This morning patient was seen and examined at bedside, resting comfortably in bed.    No acute or major events overnight.        PAST MEDICAL & SURGICAL HISTORY  CVA (cerebral vascular accident)    HLD (hyperlipidemia)    COPD (chronic obstructive pulmonary disease)    Pseudoseizure    Vertigo    Schizoaffective disorder, depressive type    Anemia    Glaucoma, unspecified glaucoma type, unspecified laterality    Bipolar disorder    History of inguinal herniorrhaphy    History of ankle surgery    H/O:     ALLERGIES:  Tegretol (Unknown)  penicillin (Unknown)  Keppra (Unknown)  Dilantin (Unknown)  phenobarbital (Unknown)    MEDICATIONS:  STANDING MEDICATIONS  albuterol/ipratropium for Nebulization 3 milliLiter(s) Nebulizer every 6 hours  ampicillin/sulbactam  IVPB 3 Gram(s) IV Intermittent every 6 hours  atorvastatin 80 milliGRAM(s) Oral at bedtime  bacitracin   Ointment 1 Application(s) Topical two times a day  brivaracetam Oral Solution 50 milliGRAM(s) Oral two times a day  chlorhexidine 0.12% Liquid 15 milliLiter(s) Oral Mucosa two times a day  chlorhexidine 2% Cloths 1 Application(s) Topical <User Schedule>  enoxaparin Injectable 40 milliGRAM(s) SubCutaneous every 24 hours  famotidine    Tablet 20 milliGRAM(s) Oral two times a day  ferrous    sulfate Liquid 300 milliGRAM(s) Enteral Tube daily  furosemide    Tablet 20 milliGRAM(s) Oral daily  latanoprost 0.005% Ophthalmic Solution 1 Drop(s) Both EYES at bedtime  levothyroxine 100 MICROGram(s) Oral daily  minocycline IVPB      minocycline IVPB 200 milliGRAM(s) IV Intermittent every 12 hours  mirtazapine 15 milliGRAM(s) Oral daily  polyethylene glycol 3350 17 Gram(s) Oral daily  senna 2 Tablet(s) Oral at bedtime  sodium chloride 1 Gram(s) Oral two times a day  valproic  acid Syrup 250 milliGRAM(s) Oral three times a day    PRN MEDICATIONS  acetaminophen     Tablet .. 650 milliGRAM(s) Oral every 6 hours PRN  diphenhydrAMINE Injectable 50 milliGRAM(s) IV Push once PRN  EPINEPHrine     1 mG/mL Injectable 0.3 milliGRAM(s) IntraMuscular once PRN  hydrocortisone sodium succinate Injectable 100 milliGRAM(s) IV Push once PRN    VITALS:   T(F): 97.9  HR: 87  BP: 116/56  RR: 26  SpO2: 94%    PHYSICAL EXAM:  GENERAL:   ( x ) NAD, lying in bed comfortably     (  ) obtunded     (  ) lethargic     (  ) somnolent    HEAD:   ( x ) Atraumatic     (  ) hematoma     (  ) laceration (specify location:       )     NECK:  (  ) Supple     (  ) neck stiffness     (  ) nuchal rigidity     (  )  no JVD     (  ) JVD present ( -- cm)    HEART:  Rate -->     ( x ) normal rate     (  ) bradycardic     (  ) tachycardic  Rhythm -->     ( x ) regular     (  ) regularly irregular     (  ) irregularly irregular  Murmurs -->     (  ) normal s1s2     (  ) systolic murmur     (  ) diastolic murmur     (  ) continuous murmur      (  ) S3 present     (  ) S4 present    LUNGS: with trach  (  )Unlabored respirations     (  ) tachypnea  ( x ) B/L air entry     (  ) decreased breath sounds in:  (location     )    (  ) no adventitious sound     (  ) crackles     (  ) wheezing      (  ) rhonchi      (specify location:       )  (  ) chest wall tenderness (specify location:       )    ABDOMEN:   ( x ) Soft     (  ) tense   |   (  ) nondistended     (  ) distended   |   (  ) +BS     (  ) hypoactive bowel sounds     (  ) hyperactive bowel sounds  (  ) nontender     (  ) RUQ tenderness     (  ) RLQ tenderness     (  ) LLQ tenderness     (  ) epigastric tenderness     (  ) diffuse tenderness  (  ) Splenomegaly      (  ) Hepatomegaly      (  ) Jaundice     (  ) ecchymosis     EXTREMITIES:  ( x ) Normal     (  ) Rash     (  ) ecchymosis     (  ) varicose veins      (  ) pitting edema     (  ) non-pitting edema   (  ) ulceration     (  ) gangrene:     (location:     )    NERVOUS SYSTEM:    ( x ) A&Ox3     (  ) confused     (  ) lethargic  CN II-XII:     (  ) Intact     (  ) deficits found     (Specify:     )   Upper extremities:     (  ) no sensorimotor deficits     (  ) weakness     (  ) loss of proprioception/vibration     (  ) loss of touch/temperature (specify:    )  Lower extremities:     (  ) no sensorimotor deficits     (  ) weakness     (  ) loss of proprioception/vibration     (  ) loss of touch/temperature (specify:    )        LABS:                        7.7    10.12 )-----------( 148      ( 22 Sep 2023 04:44 )             24.0         136  |  99  |  13  ----------------------------<  123<H>  4.7   |  26  |  0.5<L>    Ca    8.2<L>      22 Sep 2023 04:44  Mg     2.0         TPro  5.5<L>  /  Alb  2.5<L>  /  TBili  0.2  /  DBili  x   /  AST  29  /  ALT  17  /  AlkPhos  144<H>        Urinalysis Basic - ( 22 Sep 2023 04:44 )    Color: x / Appearance: x / SG: x / pH: x  Gluc: 123 mg/dL / Ketone: x  / Bili: x / Urobili: x   Blood: x / Protein: x / Nitrite: x   Leuk Esterase: x / RBC: x / WBC x   Sq Epi: x / Non Sq Epi: x / Bacteria: x      ABG - ( 22 Sep 2023 04:12 )  pH, Arterial: 7.44  pH, Blood: x     /  pCO2: 45    /  pO2: 130   / HCO3: 31    / Base Excess: 5.6   /  SaO2: 99.5

## 2023-09-24 PROCEDURE — 99233 SBSQ HOSP IP/OBS HIGH 50: CPT

## 2023-09-24 RX ADMIN — MINOCYCLINE HYDROCHLORIDE 100 MILLIGRAM(S): 45 TABLET, EXTENDED RELEASE ORAL at 18:10

## 2023-09-24 RX ADMIN — AMPICILLIN SODIUM AND SULBACTAM SODIUM 25 GRAM(S): 250; 125 INJECTION, POWDER, FOR SUSPENSION INTRAMUSCULAR; INTRAVENOUS at 20:16

## 2023-09-24 RX ADMIN — MIRTAZAPINE 15 MILLIGRAM(S): 45 TABLET, ORALLY DISINTEGRATING ORAL at 11:45

## 2023-09-24 RX ADMIN — Medication 1 APPLICATION(S): at 05:50

## 2023-09-24 RX ADMIN — Medication 250 MILLIGRAM(S): at 05:48

## 2023-09-24 RX ADMIN — SODIUM CHLORIDE 1 GRAM(S): 9 INJECTION INTRAMUSCULAR; INTRAVENOUS; SUBCUTANEOUS at 05:49

## 2023-09-24 RX ADMIN — FAMOTIDINE 20 MILLIGRAM(S): 10 INJECTION INTRAVENOUS at 05:49

## 2023-09-24 RX ADMIN — SENNA PLUS 2 TABLET(S): 8.6 TABLET ORAL at 22:39

## 2023-09-24 RX ADMIN — MINOCYCLINE HYDROCHLORIDE 100 MILLIGRAM(S): 45 TABLET, EXTENDED RELEASE ORAL at 05:48

## 2023-09-24 RX ADMIN — AMPICILLIN SODIUM AND SULBACTAM SODIUM 25 GRAM(S): 250; 125 INJECTION, POWDER, FOR SUSPENSION INTRAMUSCULAR; INTRAVENOUS at 05:50

## 2023-09-24 RX ADMIN — Medication 250 MILLIGRAM(S): at 13:11

## 2023-09-24 RX ADMIN — Medication 250 MILLIGRAM(S): at 22:39

## 2023-09-24 RX ADMIN — Medication 300 MILLIGRAM(S): at 11:45

## 2023-09-24 RX ADMIN — CHLORHEXIDINE GLUCONATE 15 MILLILITER(S): 213 SOLUTION TOPICAL at 18:09

## 2023-09-24 RX ADMIN — BRIVARACETAM 50 MILLIGRAM(S): 25 TABLET, FILM COATED ORAL at 05:48

## 2023-09-24 RX ADMIN — LATANOPROST 1 DROP(S): 0.05 SOLUTION/ DROPS OPHTHALMIC; TOPICAL at 22:36

## 2023-09-24 RX ADMIN — BRIVARACETAM 50 MILLIGRAM(S): 25 TABLET, FILM COATED ORAL at 18:09

## 2023-09-24 RX ADMIN — FAMOTIDINE 20 MILLIGRAM(S): 10 INJECTION INTRAVENOUS at 18:09

## 2023-09-24 RX ADMIN — CHLORHEXIDINE GLUCONATE 1 APPLICATION(S): 213 SOLUTION TOPICAL at 05:50

## 2023-09-24 RX ADMIN — CHLORHEXIDINE GLUCONATE 15 MILLILITER(S): 213 SOLUTION TOPICAL at 05:49

## 2023-09-24 RX ADMIN — SODIUM CHLORIDE 1 GRAM(S): 9 INJECTION INTRAMUSCULAR; INTRAVENOUS; SUBCUTANEOUS at 18:09

## 2023-09-24 RX ADMIN — Medication 1 APPLICATION(S): at 18:09

## 2023-09-24 RX ADMIN — POLYETHYLENE GLYCOL 3350 17 GRAM(S): 17 POWDER, FOR SOLUTION ORAL at 11:45

## 2023-09-24 RX ADMIN — ENOXAPARIN SODIUM 40 MILLIGRAM(S): 100 INJECTION SUBCUTANEOUS at 09:17

## 2023-09-24 RX ADMIN — AMPICILLIN SODIUM AND SULBACTAM SODIUM 25 GRAM(S): 250; 125 INJECTION, POWDER, FOR SUSPENSION INTRAMUSCULAR; INTRAVENOUS at 11:42

## 2023-09-24 RX ADMIN — ATORVASTATIN CALCIUM 80 MILLIGRAM(S): 80 TABLET, FILM COATED ORAL at 22:39

## 2023-09-24 RX ADMIN — Medication 20 MILLIGRAM(S): at 05:49

## 2023-09-24 RX ADMIN — Medication 100 MICROGRAM(S): at 05:49

## 2023-09-24 NOTE — PROGRESS NOTE ADULT - SUBJECTIVE AND OBJECTIVE BOX
24H events:    Patient is a 78y old Female who presents with a chief complaint of Hypotension (23 Sep 2023 10:46)    Primary diagnosis of Sepsis    Today is hospital day 7d. This morning patient was seen and examined at bedside, resting comfortably in bed.    No acute or major events overnight.      PAST MEDICAL & SURGICAL HISTORY  CVA (cerebral vascular accident)    HLD (hyperlipidemia)    COPD (chronic obstructive pulmonary disease)    Pseudoseizure    Vertigo    Schizoaffective disorder, depressive type    Anemia    Glaucoma, unspecified glaucoma type, unspecified laterality    Bipolar disorder    History of inguinal herniorrhaphy    History of ankle surgery    H/O:       SOCIAL HISTORY:  Social History:      ALLERGIES:  Tegretol (Unknown)  penicillin (Unknown)  Keppra (Unknown)  Dilantin (Unknown)  phenobarbital (Unknown)    MEDICATIONS:  STANDING MEDICATIONS  albuterol/ipratropium for Nebulization 3 milliLiter(s) Nebulizer every 6 hours  ampicillin/sulbactam  IVPB 3 Gram(s) IV Intermittent every 6 hours  atorvastatin 80 milliGRAM(s) Oral at bedtime  bacitracin   Ointment 1 Application(s) Topical two times a day  brivaracetam Oral Solution 50 milliGRAM(s) Oral two times a day  chlorhexidine 0.12% Liquid 15 milliLiter(s) Oral Mucosa two times a day  chlorhexidine 2% Cloths 1 Application(s) Topical <User Schedule>  enoxaparin Injectable 40 milliGRAM(s) SubCutaneous every 24 hours  famotidine    Tablet 20 milliGRAM(s) Oral two times a day  ferrous    sulfate Liquid 300 milliGRAM(s) Enteral Tube daily  furosemide    Tablet 20 milliGRAM(s) Oral daily  latanoprost 0.005% Ophthalmic Solution 1 Drop(s) Both EYES at bedtime  levothyroxine 100 MICROGram(s) Oral daily  minocycline IVPB 200 milliGRAM(s) IV Intermittent every 12 hours  minocycline IVPB      mirtazapine 15 milliGRAM(s) Oral daily  polyethylene glycol 3350 17 Gram(s) Oral daily  senna 2 Tablet(s) Oral at bedtime  sodium chloride 1 Gram(s) Oral two times a day  valproic  acid Syrup 250 milliGRAM(s) Oral three times a day    PRN MEDICATIONS  acetaminophen     Tablet .. 650 milliGRAM(s) Oral every 6 hours PRN  diphenhydrAMINE Injectable 50 milliGRAM(s) IV Push once PRN  EPINEPHrine     1 mG/mL Injectable 0.3 milliGRAM(s) IntraMuscular once PRN  hydrocortisone sodium succinate Injectable 100 milliGRAM(s) IV Push once PRN    VITALS:   T(F): 99.2  HR: 84  BP: 109/54  RR: 20  SpO2: 100%        LABS:                        7.7    10.12 )-----------( 148      ( 22 Sep 2023 04:44 )             24.0         136  |  99  |  13  ----------------------------<  123<H>  4.7   |  26  |  0.5<L>    Ca    8.2<L>      22 Sep 2023 04:44  Mg     2.0         TPro  5.5<L>  /  Alb  2.5<L>  /  TBili  0.2  /  DBili  x   /  AST  29  /  ALT  17  /  AlkPhos  144<H>        Urinalysis Basic - ( 22 Sep 2023 04:44 )    Color: x / Appearance: x / SG: x / pH: x  Gluc: 123 mg/dL / Ketone: x  / Bili: x / Urobili: x   Blood: x / Protein: x / Nitrite: x   Leuk Esterase: x / RBC: x / WBC x   Sq Epi: x / Non Sq Epi: x / Bacteria: x      ABG - ( 22 Sep 2023 04:12 )  pH, Arterial: 7.44  pH, Blood: x     /  pCO2: 45    /  pO2: 130   / HCO3: 31    / Base Excess: 5.6   /  SaO2: 99.5                      RADIOLOGY:

## 2023-09-24 NOTE — PROGRESS NOTE ADULT - ASSESSMENT
# Septic shock due to pneumonia and UTI   # Type II NSTEMI  # Hx of COPD   # Hx of acute hypoxemic respiratory failure S/P tracheostomy   # Hx of staphylococcus bacteremia, candida auris   # Hx of PEG due to oropharyngeal dysphagia   # Hypothyroidism  # Chronic Hyponatremia   # Pancytopenia, anemia  # Glaucoma  # GERD  # Seizure disorder  # Hx of CVA   # Hyperlipidemia       CNS:  Sedation: no sedation  Continue AEDs  C/W Brivaracetam  Otherwise, neuro status at baseline    HEENT:  Oral care  tracheostomy care    CVS  Levophed weaned, pt currently not on levophed  IVF boluses as tolerated for hypotension    PULMONARY  HOB @ 45 degrees, aspiration precautions  C/W PEEP to 8cm and will increase FiO2 to maintain spo2 >92%    GASTROINTESTINAL  GI prophylaxis: Pepcid BID  C/w tube feedings , dietary modified tube feedings as required  BM: on senna 2 tabs through PEG tube    GENITOURINARY/RENAL  pt is voiding, no retention currently    INFECTIOUS  BCx and UCx negative  Continue empiric broad spectrum Abx: Cefepime & Levofloxacin  MRSA nares (-) hence vanc discontinued  Tracheal sputum revealed acinetobacter baumanni  Unasyn 3g IV q6h infused over 4 hours.   and minocycline IVPB 200 milliGRAM(s) IV Intermittent every 12 hours  Pt does not have a penicillin allergy    HEMATOLOGIC  DVT PPX lovenox SQ  Duplex shows no signs of DVT even though D dimer is raised    ENDOCRINE  Follow up FS.  Insulin protocol as needed.  BG goal 140-180  Continue PTA levothyroxine    MSK/DERM  bedrest

## 2023-09-24 NOTE — PROGRESS NOTE ADULT - SUBJECTIVE AND OBJECTIVE BOX
pt seen and examined.     Resident's notes reviewed, My notes supersede resident's notes in case of discrepancy        Vital Signs Last 24 Hrs  T(C): 36.8 (24 Sep 2023 05:00), Max: 36.8 (24 Sep 2023 05:00)  T(F): 98.3 (24 Sep 2023 05:00), Max: 98.3 (24 Sep 2023 05:00)  HR: 84 (24 Sep 2023 08:55) (80 - 90)  BP: 110/53 (24 Sep 2023 05:00) (110/53 - 110/53)  BP(mean): --  RR: 20 (24 Sep 2023 05:00) (20 - 20)  SpO2: 98% (24 Sep 2023 08:55) (95% - 100%)    Parameters below as of 24 Sep 2023 08:45  Patient On (Oxygen Delivery Method): ventilator    physical exam  constitutional No acute distress, trach and peg in place, pt is non verbal , Respiratory lungs bilat air entry , CVS heart RRR, GI: abdomen Soft NT, ND, BS+, skin: peg and trach sites are clean   neuro exam, unable to participate     MEDICATIONS  (STANDING):  albuterol/ipratropium for Nebulization 3 milliLiter(s) Nebulizer every 6 hours  ampicillin/sulbactam  IVPB 3 Gram(s) IV Intermittent every 6 hours  atorvastatin 80 milliGRAM(s) Oral at bedtime  bacitracin   Ointment 1 Application(s) Topical two times a day  brivaracetam Oral Solution 50 milliGRAM(s) Oral two times a day  chlorhexidine 0.12% Liquid 15 milliLiter(s) Oral Mucosa two times a day  chlorhexidine 2% Cloths 1 Application(s) Topical <User Schedule>  enoxaparin Injectable 40 milliGRAM(s) SubCutaneous every 24 hours  famotidine    Tablet 20 milliGRAM(s) Oral two times a day  ferrous    sulfate Liquid 300 milliGRAM(s) Enteral Tube daily  furosemide    Tablet 20 milliGRAM(s) Oral daily  latanoprost 0.005% Ophthalmic Solution 1 Drop(s) Both EYES at bedtime  levothyroxine 100 MICROGram(s) Oral daily  minocycline IVPB 200 milliGRAM(s) IV Intermittent every 12 hours  minocycline IVPB      mirtazapine 15 milliGRAM(s) Oral daily  polyethylene glycol 3350 17 Gram(s) Oral daily  senna 2 Tablet(s) Oral at bedtime  sodium chloride 1 Gram(s) Oral two times a day  valproic  acid Syrup 250 milliGRAM(s) Oral three times a day    MEDICATIONS  (PRN):  acetaminophen     Tablet .. 650 milliGRAM(s) Oral every 6 hours PRN Temp greater or equal to 38C (100.4F), Moderate Pain (4 - 6)  diphenhydrAMINE Injectable 50 milliGRAM(s) IV Push once PRN in case of allergic reaction to ampicillin-sulbactam  EPINEPHrine     1 mG/mL Injectable 0.3 milliGRAM(s) IntraMuscular once PRN in case of allergic reaction to ampicillin-sulbactam  hydrocortisone sodium succinate Injectable 100 milliGRAM(s) IV Push once PRN in case of allergic reaction to ampicillin-sulbactam    Ferritin: 475 ng/mL [13 - 330] (09-18-23 @ 04:20)  Procalcitonin, Serum: 5.17 ng/mL [0.02 - 0.10] (09-17-23 @ 16:21)  D-Dimer Assay, Quantitative: 2844 ng/mL DDU (09-17-23 @ 16:21)    < from: Xray Chest 1 View- PORTABLE-Routine (09.22.23 @ 04:44) >  1. Unchanged bilateral opacities.    < end of copied text >    a/p  HPI:  a 78 year old female with past medical history of tracheostomy due to  hypoxemic respiratory failure from COVID 2020, Hx of staph bacteremia, Candida auris, PEG tube placement due to oropharyngeal dysphagia, hypothyroidism, bradycardia, C5 compression deformity, pancytopenia, bipolar disorder, glaucoma, schizoaffective disorder, COPD, HLD, CVA, seizure disorder, GERD, S/P left hip ORIF.     Presenting from [facility] for evaluation of episodes of bradycardia tachycardia, fever, low blood pressure. found to be hypotensive in the field requiring two pushes of epinephrine.   in the ED found to be hypotensive, started on bolus of LR, right femoral line was inserted, given 1 dose of vancomycin and aztreonam.   Didn't require pressors.    pt is downgraded to medical floor     # Sepsis POA, due to ventilator associated pna  now stable   cont abx   -Unasyn 3 gm iv q6h over 4h  -Minocycline 200 mg iv q12h    ID followup     # chronic respiratory failure , vent dependant via trach  trach care   vent management per pulm team     # hx of sz   cont meds     # dysphagia, functional   sp peg  cont feeding   peg site care     # hypothyroidism ,   cont meds    # bipolar, schizoaffective d/o  cont emds    #Progress Note Handoff  Pending :    Family discussion:   Disposition: snf                                      pt seen and examined.     Resident's notes reviewed, My notes supersede resident's notes in case of discrepancy        Vital Signs Last 24 Hrs  T(C): 36.8 (24 Sep 2023 05:00), Max: 36.8 (24 Sep 2023 05:00)  T(F): 98.3 (24 Sep 2023 05:00), Max: 98.3 (24 Sep 2023 05:00)  HR: 84 (24 Sep 2023 08:55) (80 - 90)  BP: 110/53 (24 Sep 2023 05:00) (110/53 - 110/53)  BP(mean): --  RR: 20 (24 Sep 2023 05:00) (20 - 20)  SpO2: 98% (24 Sep 2023 08:55) (95% - 100%)    Parameters below as of 24 Sep 2023 08:45  Patient On (Oxygen Delivery Method): ventilator    physical exam  constitutional No acute distress, trach and peg in place, pt is non verbal , Respiratory lungs bilat air entry , CVS heart RRR, GI: abdomen Soft NT, ND, BS+, skin: peg and trach sites are clean   neuro exam, unable to participate     MEDICATIONS  (STANDING):  albuterol/ipratropium for Nebulization 3 milliLiter(s) Nebulizer every 6 hours  ampicillin/sulbactam  IVPB 3 Gram(s) IV Intermittent every 6 hours  atorvastatin 80 milliGRAM(s) Oral at bedtime  bacitracin   Ointment 1 Application(s) Topical two times a day  brivaracetam Oral Solution 50 milliGRAM(s) Oral two times a day  chlorhexidine 0.12% Liquid 15 milliLiter(s) Oral Mucosa two times a day  chlorhexidine 2% Cloths 1 Application(s) Topical <User Schedule>  enoxaparin Injectable 40 milliGRAM(s) SubCutaneous every 24 hours  famotidine    Tablet 20 milliGRAM(s) Oral two times a day  ferrous    sulfate Liquid 300 milliGRAM(s) Enteral Tube daily  furosemide    Tablet 20 milliGRAM(s) Oral daily  latanoprost 0.005% Ophthalmic Solution 1 Drop(s) Both EYES at bedtime  levothyroxine 100 MICROGram(s) Oral daily  minocycline IVPB 200 milliGRAM(s) IV Intermittent every 12 hours  minocycline IVPB      mirtazapine 15 milliGRAM(s) Oral daily  polyethylene glycol 3350 17 Gram(s) Oral daily  senna 2 Tablet(s) Oral at bedtime  sodium chloride 1 Gram(s) Oral two times a day  valproic  acid Syrup 250 milliGRAM(s) Oral three times a day    MEDICATIONS  (PRN):  acetaminophen     Tablet .. 650 milliGRAM(s) Oral every 6 hours PRN Temp greater or equal to 38C (100.4F), Moderate Pain (4 - 6)  diphenhydrAMINE Injectable 50 milliGRAM(s) IV Push once PRN in case of allergic reaction to ampicillin-sulbactam  EPINEPHrine     1 mG/mL Injectable 0.3 milliGRAM(s) IntraMuscular once PRN in case of allergic reaction to ampicillin-sulbactam  hydrocortisone sodium succinate Injectable 100 milliGRAM(s) IV Push once PRN in case of allergic reaction to ampicillin-sulbactam    Ferritin: 475 ng/mL [13 - 330] (09-18-23 @ 04:20)  Procalcitonin, Serum: 5.17 ng/mL [0.02 - 0.10] (09-17-23 @ 16:21)  D-Dimer Assay, Quantitative: 2844 ng/mL DDU (09-17-23 @ 16:21)    < from: Xray Chest 1 View- PORTABLE-Routine (09.22.23 @ 04:44) >  1. Unchanged bilateral opacities.    < end of copied text >    a/p  HPI:  a 78 year old female with past medical history of tracheostomy due to  hypoxemic respiratory failure from COVID 2020, Hx of staph bacteremia, Candida auris, PEG tube placement due to oropharyngeal dysphagia, hypothyroidism, bradycardia, C5 compression deformity, pancytopenia, bipolar disorder, glaucoma, schizoaffective disorder, COPD, HLD, CVA, seizure disorder, GERD, S/P left hip ORIF.     Presenting from [facility] for evaluation of episodes of bradycardia tachycardia, fever, low blood pressure. found to be hypotensive in the field requiring two pushes of epinephrine.   in the ED found to be hypotensive, started on bolus of LR, right femoral line was inserted, given 1 dose of vancomycin and aztreonam.   Didn't require pressors.    pt is downgraded to medical floor     # Sepsis POA, due to ventilator associated pna  now stable   cont abx   -Unasyn 3 gm iv q6h over 4h  -Minocycline 200 mg iv q12h    ID followup     # chronic respiratory failure , vent dependant via trach  trach care   vent management per pulm team     # hx of sz   cont meds     # dysphagia, functional   sp peg  cont feeding   peg site care     # hypothyroidism ,   cont meds    # bipolar, schizoaffective d/o  cont meds     #Progress Note Handoff  Pending :  discharge planning   Family discussion: resident will update family   Disposition: snf     pt is high risk of morbidity due to chronic vent dependant resp failure, presence of sepsis ( POA) , sz d/o

## 2023-09-25 LAB
ALBUMIN SERPL ELPH-MCNC: 2.5 G/DL — LOW (ref 3.5–5.2)
ALP SERPL-CCNC: 140 U/L — HIGH (ref 30–115)
ALT FLD-CCNC: 12 U/L — SIGNIFICANT CHANGE UP (ref 0–41)
ANION GAP SERPL CALC-SCNC: 12 MMOL/L — SIGNIFICANT CHANGE UP (ref 7–14)
AST SERPL-CCNC: 25 U/L — SIGNIFICANT CHANGE UP (ref 0–41)
BASOPHILS # BLD AUTO: 0.04 K/UL — SIGNIFICANT CHANGE UP (ref 0–0.2)
BASOPHILS NFR BLD AUTO: 0.3 % — SIGNIFICANT CHANGE UP (ref 0–1)
BILIRUB SERPL-MCNC: 0.4 MG/DL — SIGNIFICANT CHANGE UP (ref 0.2–1.2)
BUN SERPL-MCNC: 15 MG/DL — SIGNIFICANT CHANGE UP (ref 10–20)
CALCIUM SERPL-MCNC: 7.8 MG/DL — LOW (ref 8.4–10.5)
CHLORIDE SERPL-SCNC: 97 MMOL/L — LOW (ref 98–110)
CO2 SERPL-SCNC: 29 MMOL/L — SIGNIFICANT CHANGE UP (ref 17–32)
CREAT SERPL-MCNC: 0.5 MG/DL — LOW (ref 0.7–1.5)
EGFR: 95 ML/MIN/1.73M2 — SIGNIFICANT CHANGE UP
EOSINOPHIL # BLD AUTO: 0.23 K/UL — SIGNIFICANT CHANGE UP (ref 0–0.7)
EOSINOPHIL NFR BLD AUTO: 1.8 % — SIGNIFICANT CHANGE UP (ref 0–8)
GLUCOSE SERPL-MCNC: 112 MG/DL — HIGH (ref 70–99)
HCT VFR BLD CALC: 25.6 % — LOW (ref 37–47)
HGB BLD-MCNC: 8.1 G/DL — LOW (ref 12–16)
IMM GRANULOCYTES NFR BLD AUTO: 11.9 % — HIGH (ref 0.1–0.3)
LYMPHOCYTES # BLD AUTO: 2.85 K/UL — SIGNIFICANT CHANGE UP (ref 1.2–3.4)
LYMPHOCYTES # BLD AUTO: 22.4 % — SIGNIFICANT CHANGE UP (ref 20.5–51.1)
MAGNESIUM SERPL-MCNC: 1.8 MG/DL — SIGNIFICANT CHANGE UP (ref 1.8–2.4)
MCHC RBC-ENTMCNC: 31.5 PG — HIGH (ref 27–31)
MCHC RBC-ENTMCNC: 31.6 G/DL — LOW (ref 32–37)
MCV RBC AUTO: 99.6 FL — HIGH (ref 81–99)
MONOCYTES # BLD AUTO: 1.79 K/UL — HIGH (ref 0.1–0.6)
MONOCYTES NFR BLD AUTO: 14.1 % — HIGH (ref 1.7–9.3)
NEUTROPHILS # BLD AUTO: 6.31 K/UL — SIGNIFICANT CHANGE UP (ref 1.4–6.5)
NEUTROPHILS NFR BLD AUTO: 49.5 % — SIGNIFICANT CHANGE UP (ref 42.2–75.2)
NRBC # BLD: 0 /100 WBCS — SIGNIFICANT CHANGE UP (ref 0–0)
PLATELET # BLD AUTO: 334 K/UL — SIGNIFICANT CHANGE UP (ref 130–400)
PMV BLD: 9.2 FL — SIGNIFICANT CHANGE UP (ref 7.4–10.4)
POTASSIUM SERPL-MCNC: 4.1 MMOL/L — SIGNIFICANT CHANGE UP (ref 3.5–5)
POTASSIUM SERPL-SCNC: 4.1 MMOL/L — SIGNIFICANT CHANGE UP (ref 3.5–5)
PROT SERPL-MCNC: 6 G/DL — SIGNIFICANT CHANGE UP (ref 6–8)
RBC # BLD: 2.57 M/UL — LOW (ref 4.2–5.4)
RBC # FLD: 18.7 % — HIGH (ref 11.5–14.5)
SODIUM SERPL-SCNC: 138 MMOL/L — SIGNIFICANT CHANGE UP (ref 135–146)
WBC # BLD: 12.74 K/UL — HIGH (ref 4.8–10.8)
WBC # FLD AUTO: 12.74 K/UL — HIGH (ref 4.8–10.8)

## 2023-09-25 PROCEDURE — 99233 SBSQ HOSP IP/OBS HIGH 50: CPT

## 2023-09-25 RX ADMIN — CHLORHEXIDINE GLUCONATE 15 MILLILITER(S): 213 SOLUTION TOPICAL at 05:02

## 2023-09-25 RX ADMIN — AMPICILLIN SODIUM AND SULBACTAM SODIUM 25 GRAM(S): 250; 125 INJECTION, POWDER, FOR SUSPENSION INTRAMUSCULAR; INTRAVENOUS at 13:48

## 2023-09-25 RX ADMIN — POLYETHYLENE GLYCOL 3350 17 GRAM(S): 17 POWDER, FOR SOLUTION ORAL at 12:13

## 2023-09-25 RX ADMIN — Medication 3 MILLILITER(S): at 16:14

## 2023-09-25 RX ADMIN — CHLORHEXIDINE GLUCONATE 1 APPLICATION(S): 213 SOLUTION TOPICAL at 05:03

## 2023-09-25 RX ADMIN — Medication 300 MILLIGRAM(S): at 12:13

## 2023-09-25 RX ADMIN — FAMOTIDINE 20 MILLIGRAM(S): 10 INJECTION INTRAVENOUS at 05:02

## 2023-09-25 RX ADMIN — Medication 1 APPLICATION(S): at 05:02

## 2023-09-25 RX ADMIN — BRIVARACETAM 50 MILLIGRAM(S): 25 TABLET, FILM COATED ORAL at 05:03

## 2023-09-25 RX ADMIN — MIRTAZAPINE 15 MILLIGRAM(S): 45 TABLET, ORALLY DISINTEGRATING ORAL at 12:13

## 2023-09-25 RX ADMIN — SODIUM CHLORIDE 1 GRAM(S): 9 INJECTION INTRAMUSCULAR; INTRAVENOUS; SUBCUTANEOUS at 05:03

## 2023-09-25 RX ADMIN — Medication 100 MICROGRAM(S): at 05:02

## 2023-09-25 RX ADMIN — FAMOTIDINE 20 MILLIGRAM(S): 10 INJECTION INTRAVENOUS at 18:21

## 2023-09-25 RX ADMIN — Medication 1 APPLICATION(S): at 18:21

## 2023-09-25 RX ADMIN — AMPICILLIN SODIUM AND SULBACTAM SODIUM 25 GRAM(S): 250; 125 INJECTION, POWDER, FOR SUSPENSION INTRAMUSCULAR; INTRAVENOUS at 08:40

## 2023-09-25 RX ADMIN — SENNA PLUS 2 TABLET(S): 8.6 TABLET ORAL at 21:15

## 2023-09-25 RX ADMIN — SODIUM CHLORIDE 1 GRAM(S): 9 INJECTION INTRAMUSCULAR; INTRAVENOUS; SUBCUTANEOUS at 18:22

## 2023-09-25 RX ADMIN — ENOXAPARIN SODIUM 40 MILLIGRAM(S): 100 INJECTION SUBCUTANEOUS at 08:40

## 2023-09-25 RX ADMIN — CHLORHEXIDINE GLUCONATE 15 MILLILITER(S): 213 SOLUTION TOPICAL at 18:21

## 2023-09-25 RX ADMIN — Medication 250 MILLIGRAM(S): at 21:15

## 2023-09-25 RX ADMIN — MINOCYCLINE HYDROCHLORIDE 100 MILLIGRAM(S): 45 TABLET, EXTENDED RELEASE ORAL at 18:23

## 2023-09-25 RX ADMIN — Medication 3 MILLILITER(S): at 10:08

## 2023-09-25 RX ADMIN — LATANOPROST 1 DROP(S): 0.05 SOLUTION/ DROPS OPHTHALMIC; TOPICAL at 21:18

## 2023-09-25 RX ADMIN — Medication 250 MILLIGRAM(S): at 05:02

## 2023-09-25 RX ADMIN — AMPICILLIN SODIUM AND SULBACTAM SODIUM 25 GRAM(S): 250; 125 INJECTION, POWDER, FOR SUSPENSION INTRAMUSCULAR; INTRAVENOUS at 19:48

## 2023-09-25 RX ADMIN — ATORVASTATIN CALCIUM 80 MILLIGRAM(S): 80 TABLET, FILM COATED ORAL at 21:15

## 2023-09-25 RX ADMIN — MINOCYCLINE HYDROCHLORIDE 100 MILLIGRAM(S): 45 TABLET, EXTENDED RELEASE ORAL at 06:40

## 2023-09-25 RX ADMIN — Medication 250 MILLIGRAM(S): at 13:48

## 2023-09-25 RX ADMIN — AMPICILLIN SODIUM AND SULBACTAM SODIUM 25 GRAM(S): 250; 125 INJECTION, POWDER, FOR SUSPENSION INTRAMUSCULAR; INTRAVENOUS at 00:45

## 2023-09-25 RX ADMIN — Medication 20 MILLIGRAM(S): at 05:03

## 2023-09-25 RX ADMIN — BRIVARACETAM 50 MILLIGRAM(S): 25 TABLET, FILM COATED ORAL at 18:22

## 2023-09-25 NOTE — PROGRESS NOTE ADULT - ATTENDING COMMENTS
78 year old female with past medical history of tracheostomy due to  hypoxemic respiratory failure from COVID 2020, Hx of staph bacteremia, Candida auris, PEG tube placement due to oropharyngeal dysphagia, hypothyroidism, bradycardia, C5 compression deformity, pancytopenia, bipolar disorder, glaucoma, schizoaffective disorder, COPD, HLD, CVA, seizure disorder, GERD, S/P left hip ORIF.     Presenting from [facility] for evaluation of episodes of bradycardia tachycardia, fever, low blood pressure. found to be hypotensive in the field requiring two pushes of epinephrine.   in the ED found to be hypotensive, started on bolus of LR, right femoral line was inserted, given 1 dose of vancomycin and aztreonam.   Didn't require pressors.    pt is downgraded to medical floor     # Sepsis POA, due to ventilator associated pna  now stable   cont abx per ID    ID followup     # chronic respiratory failure , vent dependant via trach  trach care   vent management per pulm team     # hx of sz   cont meds     # dysphagia, functional   sp peg  cont feeding   peg site care     # hypothyroidism ,   cont meds    # bipolar, schizoaffective d/o  cont meds     #Progress Note Handoff  Pending :  discharge planning   Family discussion: resident will update family   Disposition: snf     pt is high risk of morbidity due to chronic vent dependant resp failure, presence of sepsis ( POA) , sz d/o
Ms Anders was seen & examined at bedside today, patient remains on long-term tracheostomy ventilation and low-dose levophed for septic shock.  Continue broad spectrum antibiotics, currently no results on culture data.  TOV today after mccauley placed for acute retention.  I agree with the fellow note, with the exceptions listed in my attestation above.  The remainder of impression and plan per fellow note.
Impression    # Septic shock due to pneumonia and UTI   # Numorous Acinetobacter in the sputum  # Type II NSTEMI  # Hx of COPD   # Hx of acute hypoxemic respiratory failure S/P tracheostomy   # Hx of staphylococcus bacteremia, candida auris   # Hx of PEG due to oropharyngeal dysphagia   # Hypothyroidism  # Chronic Hyponatremia   # Pancytopenia, anemia  # Glaucoma  # GERD  # Seizure disorder  # Hx of CVA   # Hyperlipidemia     Impression and plan above have been altered and are my own.
Ms Anders was seen & examined at bedside today, patient had prior C auris infection and has now been found to have CRE Acinetobacter in sputum culture.  ID consulted, Abx recommendations pending.  Patient is otherwise stable for transfer to ventilator unit.  I agree with the fellow note, with the exceptions listed in my attestation above.  The remainder of impression and plan per fellow note.

## 2023-09-25 NOTE — PROGRESS NOTE ADULT - ASSESSMENT
· Assessment	  78 year old female with past medical history of tracheostomy due to acute hypoxemic respiratory failure from COVID 2020, Hx of staph bacteremia, Candida auris, PEG tube placement due to oropharyngeal dysphagia, hypothyroidism, bradycardia, C5 compression deformity, pancytopenia, bipolar disorder, glaucoma, schizoaffective disorder, COPD, HLD, CVA, seizure disorder, GERD, S/P left hip ORIF.     Presenting from [facility] for evaluation of episodes of bradycardia tachycardia, fever, low blood pressure. found to be hypotensive in the field requiring two pushes of epinephrine.   in the ED found to be hypotensive, started on bolus of LR, right femoral line was inserted, given 1 dose of vancomycin and aztreonam.   Didn't require pressors.    IMPRESSION/RECOMMENDATIONS   On presentation septic shock with lactic acidemia  Presently on vent with Fio2 40%  Metabolic encephalopathy  Possible RLL PNA secondary to CRAB  9/18 Trach : CRAB  Nares ORSA NG  9/17 BCx NG  9/17 UCx NG    -Unasyn 3 gm iv q6h over 4h  -Minocycline 200 mg iv q12h  -duration till 10/2  -Off loading to prevent pressure sores and preventive measures to avoid aspiration     Please do not hesitate to recall ID if any questions arise either through [id number] or through [employer]

## 2023-09-25 NOTE — PROGRESS NOTE ADULT - SUBJECTIVE AND OBJECTIVE BOX
Patient is a 78y old  Female who presents with a chief complaint of Hypotension (25 Sep 2023 10:38)        Over Night Events: ON MV>          ROS:     All ROS are negative except HPI         PHYSICAL EXAM    ICU Vital Signs Last 24 Hrs  T(C): 36.3 (25 Sep 2023 05:00), Max: 36.8 (24 Sep 2023 21:05)  T(F): 97.3 (25 Sep 2023 05:00), Max: 98.2 (24 Sep 2023 21:05)  HR: 96 (25 Sep 2023 05:00) (78 - 99)  BP: 115/55 (25 Sep 2023 05:00) (108/55 - 115/55)  BP(mean): --  ABP: --  ABP(mean): --  RR: 19 (25 Sep 2023 05:00) (19 - 19)  SpO2: 94% (25 Sep 2023 05:00) (94% - 99%)    O2 Parameters below as of 25 Sep 2023 05:00  Patient On (Oxygen Delivery Method): ventilator            CONSTITUTIONAL:  In NAD    ENT:   Airway patent,   Mouth with normal mucosa.   trach     EYES:   Pupils equal,   Round and reactive to light.    CARDIAC:   Normal rate,   Regular rhythm.        RESPIRATORY:   No wheezing  Bilateral BS  Normal chest expansion  Not tachypneic,  No use of accessory muscles    GASTROINTESTINAL:  Abdomen soft,   Non-tender,   No guarding,   + BS    MUSCULOSKELETAL:   Range of motion is not limited,  No clubbing, cyanosis    SKIN:   Skin normal color for race,           09-24-23 @ 07:01  -  09-25-23 @ 07:00  --------------------------------------------------------  IN:  Total IN: 0 mL    OUT:    Incontinent per Collection Bag (mL): 1100 mL  Total OUT: 1100 mL    Total NET: -1100 mL          LABS:                                                                                                                                                                                                                                     Mode: AC/ CMV (Assist Control/ Continuous Mandatory Ventilation)  RR (machine): 20  TV (machine): 400  FiO2: 40  PEEP: 8  ITime: 1  MAP: 12  PIP: 32                                          MEDICATIONS  (STANDING):  albuterol/ipratropium for Nebulization 3 milliLiter(s) Nebulizer every 6 hours  ampicillin/sulbactam  IVPB 3 Gram(s) IV Intermittent every 6 hours  atorvastatin 80 milliGRAM(s) Oral at bedtime  bacitracin   Ointment 1 Application(s) Topical two times a day  brivaracetam Oral Solution 50 milliGRAM(s) Oral two times a day  chlorhexidine 0.12% Liquid 15 milliLiter(s) Oral Mucosa two times a day  chlorhexidine 2% Cloths 1 Application(s) Topical <User Schedule>  enoxaparin Injectable 40 milliGRAM(s) SubCutaneous every 24 hours  famotidine    Tablet 20 milliGRAM(s) Oral two times a day  ferrous    sulfate Liquid 300 milliGRAM(s) Enteral Tube daily  furosemide    Tablet 20 milliGRAM(s) Oral daily  latanoprost 0.005% Ophthalmic Solution 1 Drop(s) Both EYES at bedtime  levothyroxine 100 MICROGram(s) Oral daily  minocycline IVPB      minocycline IVPB 200 milliGRAM(s) IV Intermittent every 12 hours  mirtazapine 15 milliGRAM(s) Oral daily  polyethylene glycol 3350 17 Gram(s) Oral daily  senna 2 Tablet(s) Oral at bedtime  sodium chloride 1 Gram(s) Oral two times a day  valproic  acid Syrup 250 milliGRAM(s) Oral three times a day    MEDICATIONS  (PRN):  acetaminophen     Tablet .. 650 milliGRAM(s) Oral every 6 hours PRN Temp greater or equal to 38C (100.4F), Moderate Pain (4 - 6)  diphenhydrAMINE Injectable 50 milliGRAM(s) IV Push once PRN in case of allergic reaction to ampicillin-sulbactam  EPINEPHrine     1 mG/mL Injectable 0.3 milliGRAM(s) IntraMuscular once PRN in case of allergic reaction to ampicillin-sulbactam  hydrocortisone sodium succinate Injectable 100 milliGRAM(s) IV Push once PRN in case of allergic reaction to ampicillin-sulbactam      New X-rays reviewed:                                                                                  ECHO    CXR interpreted by me:

## 2023-09-25 NOTE — PROGRESS NOTE ADULT - ASSESSMENT
Impression    # Septic shock due to pneumonia and UTI   # Numerous CRE Acinetobacter in the sputum  # Type II NSTEMI  # Hx of COPD   # Hx of acute hypoxemic respiratory failure S/P tracheostomy   # Hx of staphylococcus bacteremia, candida auris   # Hx of PEG due to oropharyngeal dysphagia   # Hypothyroidism  # Chronic Hyponatremia   # Pancytopenia, anemia  # Glaucoma  # GERD  # Seizure disorder  # Hx of CVA     Plan:    CNS: Continue AEDs, Monitor MS>     HEENT: Oral care, trach care.    CARDIOVASCULAR: Off pressors, maintain MAP > 65.  Avoid overload     PULMONARY: HOB @ 45 degrees, aspiration precautions; Long term ventilated.  Pulmonary toilet     GASTROINTESTINAL: GI prophylaxis:; Tube feeds. Bowel regimen PRN.     GENITOURINARY/RENAL: Replete electrolytes as needed     INFECTIOUS: B; Abx course per ID.     HEMATOLOGIC: DVT ppx: lovenox SQ.  Monitor CBC     ENDOCRINE: Follow up FS.  Insulin protocol as needed.   Continue Levothyroxine    MSK/DERM: bedrest.  Off loading     CODE STATUS: FULL    DISPO: 3 A vent unit

## 2023-09-25 NOTE — PROGRESS NOTE ADULT - SUBJECTIVE AND OBJECTIVE BOX
SUBJECTIVE:  HPI:  a 78 year old female with past medical history of tracheostomy due to acute hypoxemic respiratory failure from COVID , Hx of staph bacteremia, Candida auris, PEG tube placement due to oropharyngeal dysphagia, hypothyroidism, bradycardia, C5 compression deformity, pancytopenia, bipolar disorder, glaucoma, schizoaffective disorder, COPD, HLD, CVA, seizure disorder, GERD, S/P left hip ORIF.     Presenting from [facility] for evaluation of episodes of bradycardia tachycardia, fever, low blood pressure. found to be hypotensive in the field requiring two pushes of epinephrine.   in the ED found to be hypotensive, started on bolus of LR, right femoral line was inserted, given 1 dose of vancomycin and aztreonam.   Didn't require pressors.  last lactate 6  admitted to ICU for further evaluation.      (17 Sep 2023 16:46)      Patient is a 78y old Female who presents with a chief complaint of Hypotension (25 Sep 2023 10:25)    Currently admitted to medicine with the primary diagnosis of Sepsis       Today is hospital day 8d.     PAST MEDICAL & SURGICAL HISTORY  CVA (cerebral vascular accident)    HLD (hyperlipidemia)    COPD (chronic obstructive pulmonary disease)    Pseudoseizure    Vertigo    Schizoaffective disorder, depressive type    Anemia    Glaucoma, unspecified glaucoma type, unspecified laterality    Bipolar disorder    History of inguinal herniorrhaphy    History of ankle surgery    H/O:         ALLERGIES:  Tegretol (Unknown)  penicillin (Unknown)  Keppra (Unknown)  Dilantin (Unknown)  phenobarbital (Unknown)    MEDICATIONS:  ACTIVE MEDICATIONS  acetaminophen     Tablet .. 650 milliGRAM(s) Oral every 6 hours PRN  albuterol/ipratropium for Nebulization 3 milliLiter(s) Nebulizer every 6 hours  ampicillin/sulbactam  IVPB 3 Gram(s) IV Intermittent every 6 hours  atorvastatin 80 milliGRAM(s) Oral at bedtime  bacitracin   Ointment 1 Application(s) Topical two times a day  brivaracetam Oral Solution 50 milliGRAM(s) Oral two times a day  chlorhexidine 0.12% Liquid 15 milliLiter(s) Oral Mucosa two times a day  chlorhexidine 2% Cloths 1 Application(s) Topical <User Schedule>  diphenhydrAMINE Injectable 50 milliGRAM(s) IV Push once PRN  enoxaparin Injectable 40 milliGRAM(s) SubCutaneous every 24 hours  EPINEPHrine     1 mG/mL Injectable 0.3 milliGRAM(s) IntraMuscular once PRN  famotidine    Tablet 20 milliGRAM(s) Oral two times a day  ferrous    sulfate Liquid 300 milliGRAM(s) Enteral Tube daily  furosemide    Tablet 20 milliGRAM(s) Oral daily  hydrocortisone sodium succinate Injectable 100 milliGRAM(s) IV Push once PRN  latanoprost 0.005% Ophthalmic Solution 1 Drop(s) Both EYES at bedtime  levothyroxine 100 MICROGram(s) Oral daily  minocycline IVPB      minocycline IVPB 200 milliGRAM(s) IV Intermittent every 12 hours  mirtazapine 15 milliGRAM(s) Oral daily  polyethylene glycol 3350 17 Gram(s) Oral daily  senna 2 Tablet(s) Oral at bedtime  sodium chloride 1 Gram(s) Oral two times a day  valproic  acid Syrup 250 milliGRAM(s) Oral three times a day      VITALS:   T(F): 97.3  HR: 96  BP: 115/55  RR: 19  SpO2: 94%        PHYSICAL EXAM:  Constitutional No acute distress, trach and peg in place, pt is non verbal  RS :  lungs bilat air entry  CVS : heart RRR   GI: abdomen Soft NT, ND, BS+  Skin: peg and trach sites are clean

## 2023-09-25 NOTE — PROGRESS NOTE ADULT - ASSESSMENT
a 78 year old female with past medical history of tracheostomy due to  hypoxemic respiratory failure from COVID 2020, Hx of staph bacteremia, Candida auris, PEG tube placement due to oropharyngeal dysphagia, hypothyroidism, bradycardia, C5 compression deformity, pancytopenia, bipolar disorder, glaucoma, schizoaffective disorder, COPD, HLD, CVA, seizure disorder, GERD, S/P left hip ORIF.     Presenting from [facility] for evaluation of episodes of bradycardia tachycardia, fever, low blood pressure. found to be hypotensive in the field requiring two pushes of epinephrine.   in the ED found to be hypotensive, started on bolus of LR, right femoral line was inserted, given 1 dose of vancomycin and aztreonam.   Didn't require pressors.    pt is downgraded to medical floor     # Sepsis POA, due to ventilator associated pna  cultures growing MDR Actinobacter Baumanii   now stable   cont abx till 10/2 and discharge on midline   -Unasyn 3 gm iv q6h over 4h  -Minocycline 200 mg iv q12h  ID followup   lactate = 5.1 on admission , latest lactate =2 ( 9/22 )   needs contact isolation     # chronic respiratory failure , vent dependant via trach  tracheostomy care   vent management per pulm team     # hx of seizures   cont brivaracetam and valproate through PEG      # dysphagia, functional   sp peg  cont feeding   peg site care     #chronic anemia -High normal MCV- -  target Hb >7   keep active T & S   daily CBC     # hypothyroidism ,   -cont levothyroxine 100 *1   -no new TSH     # bipolar, schizoaffective d/o  cont meds     #Progress Note Handoff  Pending :  discharge planning   Family discussion: resident will update family   Disposition: SNF

## 2023-09-25 NOTE — PROGRESS NOTE ADULT - SUBJECTIVE AND OBJECTIVE BOX
GEOVANY ROMERO  78y, Female    All available historical data reviewed    OVERNIGHT EVENTS:  no fevers  on vent  does not follow commands    ROS:  unable to obtain history secondary to patient's mental status     VITALS:  T(F): 97.3, Max: 98.2 (09-24-23 @ 21:05)  HR: 96  BP: 115/55  RR: 19Vital Signs Last 24 Hrs  T(C): 36.3 (25 Sep 2023 05:00), Max: 36.8 (24 Sep 2023 21:05)  T(F): 97.3 (25 Sep 2023 05:00), Max: 98.2 (24 Sep 2023 21:05)  HR: 96 (25 Sep 2023 05:00) (78 - 99)  BP: 115/55 (25 Sep 2023 05:00) (108/55 - 115/55)  BP(mean): --  RR: 19 (25 Sep 2023 05:00) (19 - 19)  SpO2: 94% (25 Sep 2023 05:00) (94% - 99%)    Parameters below as of 25 Sep 2023 05:00  Patient On (Oxygen Delivery Method): ventilator        TESTS & MEASUREMENTS:              Culture - Sputum (collected 09-18-23 @ 12:20)  Source: Trach Asp Tracheal Aspirate  Gram Stain (09-19-23 @ 02:35):    Moderate polymorphonuclear leukocytes per low power field    Rare Squamous epithelial cells per low power field    Moderate Gram Negative Rods per oil power field    Few Gram Positive Rods per oil power field  Final Report (09-22-23 @ 17:54):    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)    Normal Respiratory Sara absent  Organism: Acinetobacter baumannii/nosocom group (Carbapenem Resistant)  Acinetobacter baumannii/nosocom group (Carbapenem Resistant) (09-22-23 @ 17:54)  Organism: Acinetobacter baumannii/nosocom group (Carbapenem Resistant) (09-22-23 @ 17:54)      Method Type: KB      -  Piperacillin/Tazobactam: R      -  Cefiderocol: S  Organism: Acinetobacter baumannii/nosocom group (Carbapenem Resistant) (09-22-23 @ 17:54)      Method Type: PERLA      -  Amikacin: R >32      -  Ampicillin/Sulbactam: I 16/8      -  Cefepime: R >16      -  Ceftazidime: R >16      -  Ciprofloxacin: R >2      -  Gentamicin: R >8      -  Imipenem: R >8      -  Levofloxacin: R >4      -  Meropenem: R >8      -  Tobramycin: R >8      -  Trimethoprim/Sulfamethoxazole: R >2/38      -  Colostin: I 0.25      -  Polymyxin B: I 0.5      -  Minocycline: I 8            RADIOLOGY & ADDITIONAL TESTS:  Personal review of radiological diagnostics performed  Echo and EKG results noted when applicable.     MEDICATIONS:  acetaminophen     Tablet .. 650 milliGRAM(s) Oral every 6 hours PRN  albuterol/ipratropium for Nebulization 3 milliLiter(s) Nebulizer every 6 hours  ampicillin/sulbactam  IVPB 3 Gram(s) IV Intermittent every 6 hours  atorvastatin 80 milliGRAM(s) Oral at bedtime  bacitracin   Ointment 1 Application(s) Topical two times a day  brivaracetam Oral Solution 50 milliGRAM(s) Oral two times a day  chlorhexidine 0.12% Liquid 15 milliLiter(s) Oral Mucosa two times a day  chlorhexidine 2% Cloths 1 Application(s) Topical <User Schedule>  diphenhydrAMINE Injectable 50 milliGRAM(s) IV Push once PRN  enoxaparin Injectable 40 milliGRAM(s) SubCutaneous every 24 hours  EPINEPHrine     1 mG/mL Injectable 0.3 milliGRAM(s) IntraMuscular once PRN  famotidine    Tablet 20 milliGRAM(s) Oral two times a day  ferrous    sulfate Liquid 300 milliGRAM(s) Enteral Tube daily  furosemide    Tablet 20 milliGRAM(s) Oral daily  hydrocortisone sodium succinate Injectable 100 milliGRAM(s) IV Push once PRN  latanoprost 0.005% Ophthalmic Solution 1 Drop(s) Both EYES at bedtime  levothyroxine 100 MICROGram(s) Oral daily  minocycline IVPB      minocycline IVPB 200 milliGRAM(s) IV Intermittent every 12 hours  mirtazapine 15 milliGRAM(s) Oral daily  polyethylene glycol 3350 17 Gram(s) Oral daily  senna 2 Tablet(s) Oral at bedtime  sodium chloride 1 Gram(s) Oral two times a day  valproic  acid Syrup 250 milliGRAM(s) Oral three times a day      ANTIBIOTICS:  ampicillin/sulbactam  IVPB 3 Gram(s) IV Intermittent every 6 hours  minocycline IVPB      minocycline IVPB 200 milliGRAM(s) IV Intermittent every 12 hours

## 2023-09-26 ENCOUNTER — TRANSCRIPTION ENCOUNTER (OUTPATIENT)
Age: 78
End: 2023-09-26

## 2023-09-26 VITALS
DIASTOLIC BLOOD PRESSURE: 80 MMHG | RESPIRATION RATE: 20 BRPM | TEMPERATURE: 97 F | SYSTOLIC BLOOD PRESSURE: 121 MMHG | OXYGEN SATURATION: 100 % | HEART RATE: 94 BPM

## 2023-09-26 LAB
ALBUMIN SERPL ELPH-MCNC: 2.3 G/DL — LOW (ref 3.5–5.2)
ALP SERPL-CCNC: 134 U/L — HIGH (ref 30–115)
ALT FLD-CCNC: 13 U/L — SIGNIFICANT CHANGE UP (ref 0–41)
ANION GAP SERPL CALC-SCNC: 13 MMOL/L — SIGNIFICANT CHANGE UP (ref 7–14)
AST SERPL-CCNC: 31 U/L — SIGNIFICANT CHANGE UP (ref 0–41)
BASOPHILS # BLD AUTO: 0.06 K/UL — SIGNIFICANT CHANGE UP (ref 0–0.2)
BASOPHILS NFR BLD AUTO: 0.7 % — SIGNIFICANT CHANGE UP (ref 0–1)
BILIRUB SERPL-MCNC: 0.4 MG/DL — SIGNIFICANT CHANGE UP (ref 0.2–1.2)
BLD GP AB SCN SERPL QL: SIGNIFICANT CHANGE UP
BUN SERPL-MCNC: 13 MG/DL — SIGNIFICANT CHANGE UP (ref 10–20)
CALCIUM SERPL-MCNC: 8.2 MG/DL — LOW (ref 8.4–10.4)
CHLORIDE SERPL-SCNC: 100 MMOL/L — SIGNIFICANT CHANGE UP (ref 98–110)
CO2 SERPL-SCNC: 26 MMOL/L — SIGNIFICANT CHANGE UP (ref 17–32)
CREAT SERPL-MCNC: 0.5 MG/DL — LOW (ref 0.7–1.5)
EGFR: 95 ML/MIN/1.73M2 — SIGNIFICANT CHANGE UP
EOSINOPHIL # BLD AUTO: 0.23 K/UL — SIGNIFICANT CHANGE UP (ref 0–0.7)
EOSINOPHIL NFR BLD AUTO: 2.7 % — SIGNIFICANT CHANGE UP (ref 0–8)
GLUCOSE SERPL-MCNC: 131 MG/DL — HIGH (ref 70–99)
HCT VFR BLD CALC: 25.7 % — LOW (ref 37–47)
HGB BLD-MCNC: 8 G/DL — LOW (ref 12–16)
IMM GRANULOCYTES NFR BLD AUTO: 7.9 % — HIGH (ref 0.1–0.3)
LYMPHOCYTES # BLD AUTO: 2.44 K/UL — SIGNIFICANT CHANGE UP (ref 1.2–3.4)
LYMPHOCYTES # BLD AUTO: 28.4 % — SIGNIFICANT CHANGE UP (ref 20.5–51.1)
MAGNESIUM SERPL-MCNC: 1.8 MG/DL — SIGNIFICANT CHANGE UP (ref 1.8–2.4)
MCHC RBC-ENTMCNC: 31.1 G/DL — LOW (ref 32–37)
MCHC RBC-ENTMCNC: 31.3 PG — HIGH (ref 27–31)
MCV RBC AUTO: 100.4 FL — HIGH (ref 81–99)
MONOCYTES # BLD AUTO: 1.42 K/UL — HIGH (ref 0.1–0.6)
MONOCYTES NFR BLD AUTO: 16.6 % — HIGH (ref 1.7–9.3)
NEUTROPHILS # BLD AUTO: 3.75 K/UL — SIGNIFICANT CHANGE UP (ref 1.4–6.5)
NEUTROPHILS NFR BLD AUTO: 43.7 % — SIGNIFICANT CHANGE UP (ref 42.2–75.2)
NRBC # BLD: 0 /100 WBCS — SIGNIFICANT CHANGE UP (ref 0–0)
PLATELET # BLD AUTO: 337 K/UL — SIGNIFICANT CHANGE UP (ref 130–400)
PMV BLD: 8.7 FL — SIGNIFICANT CHANGE UP (ref 7.4–10.4)
POTASSIUM SERPL-MCNC: 4.5 MMOL/L — SIGNIFICANT CHANGE UP (ref 3.5–5)
POTASSIUM SERPL-SCNC: 4.5 MMOL/L — SIGNIFICANT CHANGE UP (ref 3.5–5)
PROT SERPL-MCNC: 6.2 G/DL — SIGNIFICANT CHANGE UP (ref 6–8)
RBC # BLD: 2.56 M/UL — LOW (ref 4.2–5.4)
RBC # FLD: 18.7 % — HIGH (ref 11.5–14.5)
SODIUM SERPL-SCNC: 139 MMOL/L — SIGNIFICANT CHANGE UP (ref 135–146)
WBC # BLD: 8.58 K/UL — SIGNIFICANT CHANGE UP (ref 4.8–10.8)
WBC # FLD AUTO: 8.58 K/UL — SIGNIFICANT CHANGE UP (ref 4.8–10.8)

## 2023-09-26 PROCEDURE — 99239 HOSP IP/OBS DSCHRG MGMT >30: CPT

## 2023-09-26 PROCEDURE — 99233 SBSQ HOSP IP/OBS HIGH 50: CPT

## 2023-09-26 RX ORDER — AMPICILLIN SODIUM AND SULBACTAM SODIUM 250; 125 MG/ML; MG/ML
3 INJECTION, POWDER, FOR SUSPENSION INTRAMUSCULAR; INTRAVENOUS
Refills: 0
Start: 2023-09-26

## 2023-09-26 RX ORDER — AMPICILLIN SODIUM AND SULBACTAM SODIUM 250; 125 MG/ML; MG/ML
3 INJECTION, POWDER, FOR SUSPENSION INTRAMUSCULAR; INTRAVENOUS
Qty: 0 | Refills: 0 | DISCHARGE
Start: 2023-09-26

## 2023-09-26 RX ORDER — MINOCYCLINE HYDROCHLORIDE 45 MG/1
200 TABLET, EXTENDED RELEASE ORAL
Qty: 0 | Refills: 0 | DISCHARGE
Start: 2023-09-26 | End: 2023-10-02

## 2023-09-26 RX ORDER — ACETAMINOPHEN 500 MG
2 TABLET ORAL
Qty: 0 | Refills: 0 | DISCHARGE
Start: 2023-09-26

## 2023-09-26 RX ADMIN — CHLORHEXIDINE GLUCONATE 1 APPLICATION(S): 213 SOLUTION TOPICAL at 05:01

## 2023-09-26 RX ADMIN — Medication 300 MILLIGRAM(S): at 12:06

## 2023-09-26 RX ADMIN — MINOCYCLINE HYDROCHLORIDE 100 MILLIGRAM(S): 45 TABLET, EXTENDED RELEASE ORAL at 05:03

## 2023-09-26 RX ADMIN — Medication 20 MILLIGRAM(S): at 05:02

## 2023-09-26 RX ADMIN — AMPICILLIN SODIUM AND SULBACTAM SODIUM 25 GRAM(S): 250; 125 INJECTION, POWDER, FOR SUSPENSION INTRAMUSCULAR; INTRAVENOUS at 08:30

## 2023-09-26 RX ADMIN — POLYETHYLENE GLYCOL 3350 17 GRAM(S): 17 POWDER, FOR SOLUTION ORAL at 12:06

## 2023-09-26 RX ADMIN — Medication 100 MICROGRAM(S): at 05:02

## 2023-09-26 RX ADMIN — BRIVARACETAM 50 MILLIGRAM(S): 25 TABLET, FILM COATED ORAL at 05:00

## 2023-09-26 RX ADMIN — ENOXAPARIN SODIUM 40 MILLIGRAM(S): 100 INJECTION SUBCUTANEOUS at 08:48

## 2023-09-26 RX ADMIN — Medication 250 MILLIGRAM(S): at 05:01

## 2023-09-26 RX ADMIN — SODIUM CHLORIDE 1 GRAM(S): 9 INJECTION INTRAMUSCULAR; INTRAVENOUS; SUBCUTANEOUS at 05:02

## 2023-09-26 RX ADMIN — FAMOTIDINE 20 MILLIGRAM(S): 10 INJECTION INTRAVENOUS at 05:02

## 2023-09-26 RX ADMIN — AMPICILLIN SODIUM AND SULBACTAM SODIUM 25 GRAM(S): 250; 125 INJECTION, POWDER, FOR SUSPENSION INTRAMUSCULAR; INTRAVENOUS at 01:02

## 2023-09-26 RX ADMIN — MIRTAZAPINE 15 MILLIGRAM(S): 45 TABLET, ORALLY DISINTEGRATING ORAL at 12:06

## 2023-09-26 RX ADMIN — CHLORHEXIDINE GLUCONATE 15 MILLILITER(S): 213 SOLUTION TOPICAL at 05:01

## 2023-09-26 RX ADMIN — Medication 1 APPLICATION(S): at 05:02

## 2023-09-26 NOTE — PROGRESS NOTE ADULT - SUBJECTIVE AND OBJECTIVE BOX
Patient is a 78y old  Female who presents with a chief complaint of Hypotension (25 Sep 2023 11:38)        Over Night Events:  Remains on MV.          ROS:     All ROS are negative except HPI         PHYSICAL EXAM    ICU Vital Signs Last 24 Hrs  T(C): 36.7 (25 Sep 2023 19:06), Max: 36.7 (25 Sep 2023 19:06)  T(F): 98 (25 Sep 2023 19:06), Max: 98 (25 Sep 2023 19:06)  HR: 86 (25 Sep 2023 19:50) (86 - 95)  BP: 117/60 (25 Sep 2023 19:06) (113/57 - 117/60)  BP(mean): --  ABP: --  ABP(mean): --  RR: 20 (25 Sep 2023 19:06) (20 - 20)  SpO2: 98% (25 Sep 2023 19:50) (98% - 100%)        CONSTITUTIONAL:  In NAD    ENT:   Airway patent,   Mouth with normal mucosa.   Trach     EYES:   Pupils equal,   Round and reactive to light.    CARDIAC:   Normal rate,   Regular rhythm.    No edema    RESPIRATORY:   No wheezing  Bilateral BS  Normal chest expansion  Not tachypneic,  No use of accessory muscles    GASTROINTESTINAL:  Abdomen soft,   Non-tender,   No guarding,   + BS    MUSCULOSKELETAL:   Range of motion is not limited,  No clubbing, cyanosis      SKIN:   Skin normal color for race,   No evidence of rash.          09-25-23 @ 07:01  -  09-26-23 @ 07:00  --------------------------------------------------------  IN:    Enteral Tube Flush: 400 mL    Jevity 1.2: 600 mL  Total IN: 1000 mL    OUT:    Voided (mL): 600 mL  Total OUT: 600 mL    Total NET: 400 mL          LABS:                            8.1    12.74 )-----------( 334      ( 25 Sep 2023 14:12 )             25.6                                               09-25    138  |  97<L>  |  15  ----------------------------<  112<H>  4.1   |  29  |  0.5<L>    Ca    7.8<L>      25 Sep 2023 14:12  Mg     1.8     09-25    TPro  6.0  /  Alb  2.5<L>  /  TBili  0.4  /  DBili  x   /  AST  25  /  ALT  12  /  AlkPhos  140<H>  09-25                                             Urinalysis Basic - ( 25 Sep 2023 14:12 )    Color: x / Appearance: x / SG: x / pH: x  Gluc: 112 mg/dL / Ketone: x  / Bili: x / Urobili: x   Blood: x / Protein: x / Nitrite: x   Leuk Esterase: x / RBC: x / WBC x   Sq Epi: x / Non Sq Epi: x / Bacteria: x                                                  LIVER FUNCTIONS - ( 25 Sep 2023 14:12 )  Alb: 2.5 g/dL / Pro: 6.0 g/dL / ALK PHOS: 140 U/L / ALT: 12 U/L / AST: 25 U/L / GGT: x                                                                                               Mode: AC/ CMV (Assist Control/ Continuous Mandatory Ventilation)  RR (machine): 20  TV (machine): 400  FiO2: 40  PEEP: 8  ITime: 1  MAP: 12  PIP: 26                                          MEDICATIONS  (STANDING):  albuterol/ipratropium for Nebulization 3 milliLiter(s) Nebulizer every 6 hours  ampicillin/sulbactam  IVPB 3 Gram(s) IV Intermittent every 6 hours  atorvastatin 80 milliGRAM(s) Oral at bedtime  bacitracin   Ointment 1 Application(s) Topical two times a day  brivaracetam Oral Solution 50 milliGRAM(s) Oral two times a day  chlorhexidine 0.12% Liquid 15 milliLiter(s) Oral Mucosa two times a day  chlorhexidine 2% Cloths 1 Application(s) Topical <User Schedule>  enoxaparin Injectable 40 milliGRAM(s) SubCutaneous every 24 hours  famotidine    Tablet 20 milliGRAM(s) Oral two times a day  ferrous    sulfate Liquid 300 milliGRAM(s) Enteral Tube daily  furosemide    Tablet 20 milliGRAM(s) Oral daily  latanoprost 0.005% Ophthalmic Solution 1 Drop(s) Both EYES at bedtime  levothyroxine 100 MICROGram(s) Oral daily  minocycline IVPB      minocycline IVPB 200 milliGRAM(s) IV Intermittent every 12 hours  mirtazapine 15 milliGRAM(s) Oral daily  polyethylene glycol 3350 17 Gram(s) Oral daily  senna 2 Tablet(s) Oral at bedtime  sodium chloride 1 Gram(s) Oral two times a day  valproic  acid Syrup 250 milliGRAM(s) Oral three times a day    MEDICATIONS  (PRN):  acetaminophen     Tablet .. 650 milliGRAM(s) Oral every 6 hours PRN Temp greater or equal to 38C (100.4F), Moderate Pain (4 - 6)  diphenhydrAMINE Injectable 50 milliGRAM(s) IV Push once PRN in case of allergic reaction to ampicillin-sulbactam  EPINEPHrine     1 mG/mL Injectable 0.3 milliGRAM(s) IntraMuscular once PRN in case of allergic reaction to ampicillin-sulbactam  hydrocortisone sodium succinate Injectable 100 milliGRAM(s) IV Push once PRN in case of allergic reaction to ampicillin-sulbactam

## 2023-09-26 NOTE — DISCHARGE NOTE PROVIDER - HOSPITAL COURSE
a 78 year old female with past medical history of tracheostomy due to acute hypoxemic respiratory failure from COVID 2020, Hx of staph bacteremia, Candida auris, PEG tube placement due to oropharyngeal dysphagia, hypothyroidism, bradycardia, C5 compression deformity, pancytopenia, bipolar disorder, glaucoma, schizoaffective disorder, COPD, HLD, CVA, seizure disorder, GERD, S/P left hip ORIF.     Presenting from [facility] for evaluation of episodes of bradycardia tachycardia, fever, low blood pressure. found to be hypotensive in the field requiring two pushes of epinephrine.   in the ED found to be hypotensive, started on bolus of LR, right femoral line was inserted, given 1 dose of vancomycin and aztreonam.   Didn't require pressors.  last lactate 6  admitted to ICU for further management.    patient recieved IV antibiotics in ICU and then was downgraded to medical floor       rest of stay as following :     # Sepsis POA, due to ventilator associated pna  cultures growing MDR Actinobacter Baumanii   now stable   cont abx till 10/2 and discharge on midline   -Unasyn 3 gm iv q6h over 4h  -Minocycline 200 mg iv q12h  ID followup   lactate = 5.1 on admission , latest lactate =2 ( 9/22 )   needs contact isolation     # chronic respiratory failure , vent dependant via trach  tracheostomy care   vent management per pulm team     # hx of seizures   cont brivaracetam and valproate through PEG      # dysphagia, functional   sp peg  cont feeding   peg site care     #chronic anemia -High normal MCV- -  target Hb >7   keep active T & S   daily CBC     # hypothyroidism ,   -cont levothyroxine 100 *1   -no new TSH     # bipolar, schizoaffective d/o  cont meds        a 78 year old female with past medical history of tracheostomy due to acute hypoxemic respiratory failure from COVID 2020, Hx of staph bacteremia, Candida auris, PEG tube placement due to oropharyngeal dysphagia, hypothyroidism, bradycardia, C5 compression deformity, pancytopenia, bipolar disorder, glaucoma, schizoaffective disorder, COPD, HLD, CVA, seizure disorder, GERD, S/P left hip ORIF.     Presenting from [facility] for evaluation of episodes of bradycardia tachycardia, fever, low blood pressure. found to be hypotensive in the field requiring two pushes of epinephrine.   in the ED found to be hypotensive, started on bolus of LR, right femoral line was inserted, given 1 dose of vancomycin and aztreonam.   Didn't require pressors.  last lactate 6  admitted to ICU for further management.    patient recieved IV antibiotics in ICU and then was downgraded to medical floor       rest of stay as following :     # Sepsis POA, due to ventilator associated pna  cultures growing MDR Actinobacter Baumanii   now stable   id notes appreciated     -Unasyn 3 gm iv q6h over 4h  -Minocycline 200 mg iv q12h  -duration till 10/2    lactate = 5.1 on admission , latest lactate =2 ( 9/22 )   needs contact isolation     # chronic respiratory failure , vent dependant via trach  tracheostomy care   vent management per pulm team     # hx of seizures   cont brivaracetam and valproate through PEG      # dysphagia, functional   sp peg  cont feeding   peg site care     #chronic anemia -High normal MCV- -  target Hb >7   keep active T & S   daily CBC     # hypothyroidism ,   -cont levothyroxine 100 *1   -no new TSH     # bipolar, schizoaffective d/o  cont meds

## 2023-09-26 NOTE — DISCHARGE NOTE PROVIDER - NSDCCPCAREPLAN_GEN_ALL_CORE_FT
PRINCIPAL DISCHARGE DIAGNOSIS  Diagnosis: Sepsis  Assessment and Plan of Treatment: Sepsis is a serious condition that occurs when the body overreacts to an infection. It is also called systemic inflammatory response syndrome (SIRS) with infection. An infection is usually caused by bacteria that attack the body. The body's defense system normally fights off infection within the affected body part. With sepsis, the body overreacts and causes symptoms to occur throughout the body. This leads to uncontrolled and widespread inflammation and clotting in small blood vessels. Blood flow to different body parts decreases and may lead to organ failure. Sepsis requires immediate treatment.  You were treated in the hospital with IV antibiotics, and your symptoms improved.   You will be discharged on IV antibioitcs to be given thorugh your midline   duration until october 2nd 2023  Your sepsis was due to a multi-drug resistend bug called Actinobater Baumanii.   You will need to follow with Infectious Disease Dr Honeycutt in 2 weeks , call Phone: [phone]  Please seek immediate medical attention if you develop fever >100.4 F, feel dizzy, have nausea or vomiting, coughing blood, have sudden trouble breathing or chest pain, severe weakness, or your skin or lips are turning blue.        SECONDARY DISCHARGE DIAGNOSES  Diagnosis: Pneumonia  Assessment and Plan of Treatment:     Diagnosis: Acute UTI  Assessment and Plan of Treatment:      PRINCIPAL DISCHARGE DIAGNOSIS  Diagnosis: Sepsis  Assessment and Plan of Treatment: Sepsis is a serious condition that occurs when the body overreacts to an infection. It is also called systemic inflammatory response syndrome (SIRS) with infection. An infection is usually caused by bacteria that attack the body. The body's defense system normally fights off infection within the affected body part. With sepsis, the body overreacts and causes symptoms to occur throughout the body. This leads to uncontrolled and widespread inflammation and clotting in small blood vessels. Blood flow to different body parts decreases and may lead to organ failure. Sepsis requires immediate treatment.  You were treated in the hospital with IV antibiotics, and your symptoms improved.   You will be discharged on IV antibioitcs to be given thorugh your midline   duration until october 2nd 2023  midline has to be removed after last dose of antibiotics on Oct 2nd  Your sepsis was due to a multi-drug resistend bug called Latisha Wangii.   You will need to follow with Infectious Disease Dr Galicia in 2 weeks , call Phone: [phone]  Please seek immediate medical attention if you develop fever >100.4 F, feel dizzy, have nausea or vomiting, coughing blood, have sudden trouble breathing or chest pain, severe weakness, or your skin or lips are turning blue.        SECONDARY DISCHARGE DIAGNOSES  Diagnosis: Pneumonia  Assessment and Plan of Treatment:     Diagnosis: Acute UTI  Assessment and Plan of Treatment:

## 2023-09-26 NOTE — DISCHARGE NOTE NURSING/CASE MANAGEMENT/SOCIAL WORK - PATIENT PORTAL LINK FT
You can access the FollowMyHealth Patient Portal offered by [facility] by registering at the following website: [email or web address]. By joining [employer]’s FollowMyHealth portal, you will also be able to view your health information using other applications (apps) compatible with our system.

## 2023-09-26 NOTE — DISCHARGE NOTE PROVIDER - CARE PROVIDER_API CALL
Eliceo Honeycutt  Infectious Disease  [address] [place] [address]  Phone: [phone]  Fax: [phone]  Follow Up Time: 2 weeks   Eliceo Honeycutt  Infectious Disease  [address] [place] [address]  Phone: [phone]  Fax: [phone]  Follow Up Time: 2 weeks    David Wilkins  Geriatric Medicine  [address]  [address],    Phone: ()-  Fax: ()-  Follow Up Time: 2 weeks   David Wilkins  Geriatric Medicine  [address],    Phone: ()-  Fax: ()-  Follow Up Time: 2 weeks    Ivan Galicia  Infectious Disease  [address] [place] [address]  Phone: [phone]  Fax: [phone]  Follow Up Time: 2 weeks

## 2023-09-26 NOTE — DISCHARGE NOTE PROVIDER - NSDCFUADDINST_GEN_ALL_CORE_FT
Antibiotic course -  -Unasyn 3 gm iv q6h over 4h  -Minocycline 200 mg iv q12h  -duration till 10/2  -Off loading to prevent pressure sores and preventive measures to avoid aspiration

## 2023-09-26 NOTE — DISCHARGE NOTE PROVIDER - INSTRUCTIONS
tube Feeding Modality: Gastrostomy  Jevity 1.2 Anastacio  Total Volume for 24 Hours (mL): 900  Bolus  Total Volume of Bolus (mL):  150  Tube Feed Frequency: Every 4 hours   Tube Feed Start Time: 12:00  Bolus Feed Rate (mL per Hour): 50   Bolus Feed Duration (in Hours): 3  Free Water Flush  Bolus   Total Volume per Flush (mL): 100   Frequency: Every 4 Hours  No Carb Prosource (1pkg = 15gms Protein)     Qty per Day:  2 (09-25-23 @ 10:53) [Active]

## 2023-09-26 NOTE — CHART NOTE - NSCHARTNOTEFT_GEN_A_CORE
Registered Dietitian Follow-Up     Patient Profile Reviewed                           Yes [x]   No []     Nutrition History Previously Obtained        Yes [x]  No []       Pertinent Subjective Information:  Spoke with RN regarding patient's EN regimen. Patient is tolerating feeds. Had a very soft BM this morning.      Pertinent Medical Interventions:  Sepsis POA, due to ventilator associated PNA; Chronic respiratory failure, vent dependent via trach; tracheostomy; hx of seizures; dysphagia, functional - s/p PEG, cont feeding; PEG site care; chronic anemia; hypothyroidism; bipolar, shizoaffective d/o     Diet order:   Diet, NPO with Tube Feed:   Tube Feeding Modality: Gastrostomy  Jevity 1.2 Anastacio  Total Volume for 24 Hours (mL): 900  Bolus  Total Volume of Bolus (mL):  150  Tube Feed Frequency: Every 4 hours   Tube Feed Start Time: 12:00  Bolus Feed Rate (mL per Hour): 50   Bolus Feed Duration (in Hours): 3  Free Water Flush  Bolus   Total Volume per Flush (mL): 100   Frequency: Every 4 Hours  No Carb Prosource (1pkg = 15gms Protein)     Qty per Day:  2 (09-25-23 @ 10:53) [Active]    Anthropometrics:  Height: 160 cm   Weight: 76.2 kg   BMI: 29.8  IBW: 52.3 kg     Daily   % Weight Change    MEDICATIONS  (STANDING):  albuterol/ipratropium for Nebulization 3 milliLiter(s) Nebulizer every 6 hours  ampicillin/sulbactam  IVPB 3 Gram(s) IV Intermittent every 6 hours  atorvastatin 80 milliGRAM(s) Oral at bedtime  bacitracin   Ointment 1 Application(s) Topical two times a day  brivaracetam Oral Solution 50 milliGRAM(s) Oral two times a day  chlorhexidine 0.12% Liquid 15 milliLiter(s) Oral Mucosa two times a day  chlorhexidine 2% Cloths 1 Application(s) Topical <User Schedule>  enoxaparin Injectable 40 milliGRAM(s) SubCutaneous every 24 hours  famotidine    Tablet 20 milliGRAM(s) Oral two times a day  ferrous    sulfate Liquid 300 milliGRAM(s) Enteral Tube daily  furosemide    Tablet 20 milliGRAM(s) Oral daily  latanoprost 0.005% Ophthalmic Solution 1 Drop(s) Both EYES at bedtime  levothyroxine 100 MICROGram(s) Oral daily  minocycline IVPB      minocycline IVPB 200 milliGRAM(s) IV Intermittent every 12 hours  mirtazapine 15 milliGRAM(s) Oral daily  polyethylene glycol 3350 17 Gram(s) Oral daily  senna 2 Tablet(s) Oral at bedtime  sodium chloride 1 Gram(s) Oral two times a day  valproic  acid Syrup 250 milliGRAM(s) Oral three times a day    MEDICATIONS  (PRN):  acetaminophen     Tablet .. 650 milliGRAM(s) Oral every 6 hours PRN Temp greater or equal to 38C (100.4F), Moderate Pain (4 - 6)  diphenhydrAMINE Injectable 50 milliGRAM(s) IV Push once PRN in case of allergic reaction to ampicillin-sulbactam  EPINEPHrine     1 mG/mL Injectable 0.3 milliGRAM(s) IntraMuscular once PRN in case of allergic reaction to ampicillin-sulbactam  hydrocortisone sodium succinate Injectable 100 milliGRAM(s) IV Push once PRN in case of allergic reaction to ampicillin-sulbactam    Pertinent Labs: 09-26 @ 08:02: Na 139, BUN 13, Cr 0.5<L>, <H>, K+ 4.5, Phos --, Mg 1.8, Alk Phos 134<H>, ALT/SGPT 13, AST/SGOT 31, HbA1c --  09-25 @ 14:12: Na 138, BUN 15, Cr 0.5<L>, <H>, K+ 4.1, Phos --, Mg 1.8, Alk Phos 140<H>, ALT/SGPT 12, AST/SGOT 25, HbA1c --    Finger Sticks:    Physical Findings:  - Appearance: unable to speak; trached  - GI function: last BM this morning - very soft  - Tubes: +PEG   - Oral/Mouth cavity: NPO with EN via PEG   - Skin: no pressure injuries documented  - Edema: 3+ generalized edema     Nutrition Requirements:  Weight Used: 76.2 kg      Estimated Energy Needs    Continue [x]  Adjust []  1143 - 1524 kcal/day (15-20 kg)     Estimated Protein Needs    Continue [x]  Adjust []  91 - 107 gm/day (1.2 - 1.4 gm/kg)     Estimated Fluid Needs        Continue [x]  Adjust []  1 mL/kcal      Nutrient Intake:     [x] Previous Nutrition Diagnosis:            [] Ongoing          [] Resolved    [] No active nutrition diagnosis identified at this time     Nutrition Intervention      Goal/Expected Outcome:      Indicator/Monitoring:      Recommendation: Registered Dietitian Follow-Up     Patient Profile Reviewed                           Yes [x]   No []     Nutrition History Previously Obtained        Yes [x]  No []       Pertinent Subjective Information:  Spoke with RN regarding patient's EN regimen. Patient is tolerating feeds. Had a very soft BM this morning.      Pertinent Medical Interventions:  Sepsis POA, due to ventilator associated PNA; Chronic respiratory failure, vent dependent via trach; tracheostomy; hx of seizures; dysphagia, functional - s/p PEG, cont feeding; PEG site care; chronic anemia; hypothyroidism; bipolar, shizoaffective d/o     Diet order:   Diet, NPO with Tube Feed:   Tube Feeding Modality: Gastrostomy  Jevity 1.2 Anastacio  Total Volume for 24 Hours (mL): 900  Bolus  Total Volume of Bolus (mL):  150  Tube Feed Frequency: Every 4 hours   Tube Feed Start Time: 12:00  Bolus Feed Rate (mL per Hour): 50   Bolus Feed Duration (in Hours): 3  Free Water Flush  Bolus   Total Volume per Flush (mL): 100   Frequency: Every 4 Hours  No Carb Prosource (1pkg = 15gms Protein)     Qty per Day:  2 (09-25-23 @ 10:53) [Active]    Anthropometrics:  Height: 160 cm   Weight: 76.2 kg   BMI: 29.8  IBW: 52.3 kg     MEDICATIONS  (STANDING):  albuterol/ipratropium for Nebulization 3 milliLiter(s) Nebulizer every 6 hours  ampicillin/sulbactam  IVPB 3 Gram(s) IV Intermittent every 6 hours  atorvastatin 80 milliGRAM(s) Oral at bedtime  bacitracin   Ointment 1 Application(s) Topical two times a day  brivaracetam Oral Solution 50 milliGRAM(s) Oral two times a day  chlorhexidine 0.12% Liquid 15 milliLiter(s) Oral Mucosa two times a day  chlorhexidine 2% Cloths 1 Application(s) Topical <User Schedule>  enoxaparin Injectable 40 milliGRAM(s) SubCutaneous every 24 hours  famotidine    Tablet 20 milliGRAM(s) Oral two times a day  ferrous    sulfate Liquid 300 milliGRAM(s) Enteral Tube daily  furosemide    Tablet 20 milliGRAM(s) Oral daily  latanoprost 0.005% Ophthalmic Solution 1 Drop(s) Both EYES at bedtime  levothyroxine 100 MICROGram(s) Oral daily  minocycline IVPB      minocycline IVPB 200 milliGRAM(s) IV Intermittent every 12 hours  mirtazapine 15 milliGRAM(s) Oral daily  polyethylene glycol 3350 17 Gram(s) Oral daily  senna 2 Tablet(s) Oral at bedtime  sodium chloride 1 Gram(s) Oral two times a day  valproic  acid Syrup 250 milliGRAM(s) Oral three times a day    MEDICATIONS  (PRN):  acetaminophen     Tablet .. 650 milliGRAM(s) Oral every 6 hours PRN Temp greater or equal to 38C (100.4F), Moderate Pain (4 - 6)  diphenhydrAMINE Injectable 50 milliGRAM(s) IV Push once PRN in case of allergic reaction to ampicillin-sulbactam  EPINEPHrine     1 mG/mL Injectable 0.3 milliGRAM(s) IntraMuscular once PRN in case of allergic reaction to ampicillin-sulbactam  hydrocortisone sodium succinate Injectable 100 milliGRAM(s) IV Push once PRN in case of allergic reaction to ampicillin-sulbactam    Pertinent Labs: 09-26 @ 08:02: Na 139, BUN 13, Cr 0.5<L>, <H>, K+ 4.5, Phos --, Mg 1.8, Alk Phos 134<H>, ALT/SGPT 13, AST/SGOT 31, HbA1c --  09-25 @ 14:12: Na 138, BUN 15, Cr 0.5<L>, <H>, K+ 4.1, Phos --, Mg 1.8, Alk Phos 140<H>, ALT/SGPT 12, AST/SGOT 25, HbA1c --    Finger Sticks:    Physical Findings:  - Appearance: unable to speak; trached  - GI function: last BM this morning - very soft  - Tubes: +PEG   - Oral/Mouth cavity: NPO with EN via PEG   - Skin: no pressure injuries documented  - Edema: 3+ generalized edema     Nutrition Requirements:  Weight Used: 76.2 kg      Estimated Energy Needs    Continue [x]  Adjust []  1143 - 1524 kcal/day (15-20 kg)     Estimated Protein Needs    Continue [x]  Adjust []  91 - 107 gm/day (1.2 - 1.4 gm/kg)     Estimated Fluid Needs        Continue [x]  Adjust []  1 mL/kcal      Nutrient Intake: Current EN regimen provides 900 mL volume, 1200 kcal, 80 gm Protein, 1449 mL total water (water flushes + flushes pre/post no carb prosource     [x] Previous Nutrition Diagnosis:  Inadequate Protein Energy Intake             [] Ongoing          [x] Resolved     Nutrition Intervention:  EN, coordination of care     Goal/Expected Outcome:   EN to meet >85% and <105% of estimated nutrient needs      Indicator/Monitoring:   energy intake, weight, labs, skin status, NFPE     Recommendation:  1) Continue current EN regimen as it is appropriate and well tolerated  2) Monitor BM and GI s/s    Patient is planned to be discharged today. RD to f/u in 5-7 days if not discharged    RD to remain available: Yulia Peralta RD [phone] or via Teams

## 2023-09-26 NOTE — DISCHARGE NOTE NURSING/CASE MANAGEMENT/SOCIAL WORK - NSDCPEFALRISK_GEN_ALL_CORE
For information on Fall & Injury Prevention, visit: [email or web address] OR  [email or web address] OR  https://www.cdc.gov/steadi/patient.html

## 2023-09-26 NOTE — DISCHARGE NOTE PROVIDER - PROVIDER TOKENS
PROVIDER:[TOKEN:[[id number]],FOLLOWUP:[2 weeks]] PROVIDER:[TOKEN:[[id number]],FOLLOWUP:[2 weeks]],PROVIDER:[TOKEN:[[id number]],FOLLOWUP:[2 weeks]] PROVIDER:[TOKEN:[[id number]],FOLLOWUP:[2 weeks]],PROVIDER:[TOKEN:[[id number]],FOLLOWUP:[2 weeks]]

## 2023-09-26 NOTE — DISCHARGE NOTE PROVIDER - ATTENDING DISCHARGE PHYSICAL EXAMINATION:
T(F): 98.4 (09-26-23 @ 08:45), Max: 98.4 (09-26-23 @ 08:45)  HR: 85 (09-26-23 @ 08:05) (83 - 89)  BP: 121/69 (09-26-23 @ 07:39) (117/60 - 121/69)  RR: 20 (09-26-23 @ 08:45) (20 - 20)  SpO2: 100% (09-26-23 @ 08:45) (98% - 100%)    Physical exam:   constitutional NAD, awake, has eye tracking, non verbal, trach and peg in place , skin around peg is dry and scaly but no discharge, not tender, Respiratory  lungs CTA, CVS heart RRR, GI: abdomen obese, non tender, ext , has foot deformity, functional paraplegia,   neuro exam unable to follow commands /participate

## 2023-09-26 NOTE — PROGRESS NOTE ADULT - REASON FOR ADMISSION
Hypotension
Septic shanelle

## 2023-09-26 NOTE — DISCHARGE NOTE PROVIDER - NSDCMRMEDTOKEN_GEN_ALL_CORE_FT
acetaminophen 325 mg oral tablet: 2 tab(s) orally every 6 hours As needed Temp greater or equal to 38C (100.4F), Moderate Pain (4 - 6)  ascorbic acid 500 mg/5 mL oral liquid: 5 milligram(s) by gastrostomy tube once a day  bacitracin 500 units/g topical ointment: Apply topically to affected area 2 times a day apply over forearms  Briviact 10 mg/mL oral liquid: 5 milliliter(s) by gastrostomy tube 2 times a day  chlorhexidine 0.12% mucous membrane liquid: 15 milliliter(s) orally 2 times a day  Depakene 250 mg/5 mL oral liquid: 5 milliliter(s) by gastrostomy tube every 8 hours  famotidine 20 mg oral tablet: 1 tab(s) by gastrostomy tube 2 times a day  ferrous sulfate 220 mg/5 mL (44 mg/5 mL elemental iron) oral elixir: 5 milliliter(s) by gastrostomy tube once a day  furosemide 20 mg oral tablet: 1 tab(s) by gastrostomy tube once a day  ipratropium 17 mcg/inh inhalation aerosol: 2 puff(s) by metered dose inhaler every 6 hours  latanoprost 0.005% ophthalmic solution: 1 gram(s) in each eye once a day (at bedtime)  levothyroxine 100 mcg (0.1 mg) oral tablet: 1 tab(s) by gastrostomy tube once a day  Lipitor 80 mg oral tablet: 1 tab(s) by gastrostomy tube once a day (at bedtime)  minocycline 100 mg intravenous injection: 200 milligram(s) intravenous 2 times a day  mirtazapine 15 mg oral tablet: 1 tab(s) by gastrostomy tube once a day  Multiple Vitamins oral liquid: 15 milliliter(s) by gastrostomy tube once a day  Mycostatin 100,000 units/g topical powder: Apply topically to affected area once a day to affected area  senna (sennosides) 8.6 mg oral tablet: 1 tab(s) by gastrostomy tube once a day (at bedtime)  sodium chloride 1000 mg oral tablet, soluble: 1 tab(s) by gastrostomy tube 2 times a day  vibegron 75 mg oral tablet: 1 tab(s) by gastrostomy tube once a day   acetaminophen 325 mg oral tablet: 2 tab(s) orally every 6 hours As needed Temp greater or equal to 38C (100.4F), Moderate Pain (4 - 6)  ampicillin-sulbactam: 3 gram(s) intravenously every 6 hours Each dose to be given over 4 hours; Complete the course on 10/2/2023  ascorbic acid 500 mg/5 mL oral liquid: 5 milligram(s) by gastrostomy tube once a day  bacitracin 500 units/g topical ointment: Apply topically to affected area 2 times a day apply over forearms  Briviact 10 mg/mL oral liquid: 5 milliliter(s) by gastrostomy tube 2 times a day  chlorhexidine 0.12% mucous membrane liquid: 15 milliliter(s) orally 2 times a day  Depakene 250 mg/5 mL oral liquid: 5 milliliter(s) by gastrostomy tube every 8 hours  famotidine 20 mg oral tablet: 1 tab(s) by gastrostomy tube 2 times a day  ferrous sulfate 220 mg/5 mL (44 mg/5 mL elemental iron) oral elixir: 5 milliliter(s) by gastrostomy tube once a day  furosemide 20 mg oral tablet: 1 tab(s) by gastrostomy tube once a day  ipratropium 17 mcg/inh inhalation aerosol: 2 puff(s) by metered dose inhaler every 6 hours  latanoprost 0.005% ophthalmic solution: 1 gram(s) in each eye once a day (at bedtime)  levothyroxine 100 mcg (0.1 mg) oral tablet: 1 tab(s) by gastrostomy tube once a day  Lipitor 80 mg oral tablet: 1 tab(s) by gastrostomy tube once a day (at bedtime)  minocycline 100 mg intravenous injection: 200 milligram(s) intravenous 2 times a day  mirtazapine 15 mg oral tablet: 1 tab(s) by gastrostomy tube once a day  Multiple Vitamins oral liquid: 15 milliliter(s) by gastrostomy tube once a day  Mycostatin 100,000 units/g topical powder: Apply topically to affected area once a day to affected area  senna (sennosides) 8.6 mg oral tablet: 1 tab(s) by gastrostomy tube once a day (at bedtime)  sodium chloride 1000 mg oral tablet, soluble: 1 tab(s) by gastrostomy tube 2 times a day  vibegron 75 mg oral tablet: 1 tab(s) by gastrostomy tube once a day

## 2023-10-02 DIAGNOSIS — Z93.1 GASTROSTOMY STATUS: ICD-10-CM

## 2023-10-02 DIAGNOSIS — Z79.899 OTHER LONG TERM (CURRENT) DRUG THERAPY: ICD-10-CM

## 2023-10-02 DIAGNOSIS — Z86.16 PERSONAL HISTORY OF COVID-19: ICD-10-CM

## 2023-10-02 DIAGNOSIS — Z88.0 ALLERGY STATUS TO PENICILLIN: ICD-10-CM

## 2023-10-02 DIAGNOSIS — Z93.0 TRACHEOSTOMY STATUS: ICD-10-CM

## 2023-10-02 DIAGNOSIS — J96.10 CHRONIC RESPIRATORY FAILURE, UNSPECIFIED WHETHER WITH HYPOXIA OR HYPERCAPNIA: ICD-10-CM

## 2023-10-02 DIAGNOSIS — R65.21 SEVERE SEPSIS WITH SEPTIC SHOCK: ICD-10-CM

## 2023-10-02 DIAGNOSIS — E83.42 HYPOMAGNESEMIA: ICD-10-CM

## 2023-10-02 DIAGNOSIS — Z79.890 HORMONE REPLACEMENT THERAPY: ICD-10-CM

## 2023-10-02 DIAGNOSIS — F25.9 SCHIZOAFFECTIVE DISORDER, UNSPECIFIED: ICD-10-CM

## 2023-10-02 DIAGNOSIS — G40.909 EPILEPSY, UNSPECIFIED, NOT INTRACTABLE, WITHOUT STATUS EPILEPTICUS: ICD-10-CM

## 2023-10-02 DIAGNOSIS — A41.89 OTHER SPECIFIED SEPSIS: ICD-10-CM

## 2023-10-02 DIAGNOSIS — H40.9 UNSPECIFIED GLAUCOMA: ICD-10-CM

## 2023-10-02 DIAGNOSIS — N32.81 OVERACTIVE BLADDER: ICD-10-CM

## 2023-10-02 DIAGNOSIS — E78.5 HYPERLIPIDEMIA, UNSPECIFIED: ICD-10-CM

## 2023-10-02 DIAGNOSIS — Z88.8 ALLERGY STATUS TO OTHER DRUGS, MEDICAMENTS AND BIOLOGICAL SUBSTANCES: ICD-10-CM

## 2023-10-02 DIAGNOSIS — F31.9 BIPOLAR DISORDER, UNSPECIFIED: ICD-10-CM

## 2023-10-02 DIAGNOSIS — R00.1 BRADYCARDIA, UNSPECIFIED: ICD-10-CM

## 2023-10-02 DIAGNOSIS — J98.11 ATELECTASIS: ICD-10-CM

## 2023-10-02 DIAGNOSIS — K21.9 GASTRO-ESOPHAGEAL REFLUX DISEASE WITHOUT ESOPHAGITIS: ICD-10-CM

## 2023-10-02 DIAGNOSIS — I21.A1 MYOCARDIAL INFARCTION TYPE 2: ICD-10-CM

## 2023-10-02 DIAGNOSIS — M50.90 CERVICAL DISC DISORDER, UNSPECIFIED, UNSPECIFIED CERVICAL REGION: ICD-10-CM

## 2023-10-02 DIAGNOSIS — E03.9 HYPOTHYROIDISM, UNSPECIFIED: ICD-10-CM

## 2023-10-02 DIAGNOSIS — R13.12 DYSPHAGIA, OROPHARYNGEAL PHASE: ICD-10-CM

## 2023-10-02 DIAGNOSIS — R33.9 RETENTION OF URINE, UNSPECIFIED: ICD-10-CM

## 2023-10-02 DIAGNOSIS — J95.851 VENTILATOR ASSOCIATED PNEUMONIA: ICD-10-CM

## 2023-10-02 DIAGNOSIS — J44.0 CHRONIC OBSTRUCTIVE PULMONARY DISEASE WITH (ACUTE) LOWER RESPIRATORY INFECTION: ICD-10-CM

## 2023-10-02 DIAGNOSIS — I69.391 DYSPHAGIA FOLLOWING CEREBRAL INFARCTION: ICD-10-CM

## 2023-10-11 ENCOUNTER — TRANSCRIPTION ENCOUNTER (OUTPATIENT)
Age: 78
End: 2023-10-11

## 2024-03-18 NOTE — ED PROCEDURE NOTE - ATTENDING WITH...
Patient arrives to ED requesting STD testing. Patient denies any symptoms. Patient denies any known STD exposure. VSS on arrival.    Resident

## 2024-10-29 NOTE — ED ADULT NURSE NOTE - PAIN: PRESENCE, MLM
Patient called back regarding a message she received to schedule a follow up with Dr Cobb. Writer was able to warm transfer to the resident line for assistance.    denies pain/discomfort

## 2025-02-27 NOTE — PROGRESS NOTE ADULT - ASSESSMENT
GEOVANY ROMERO is a 78y woman with a medical history significant for Severe COVID, seizuire disorder, pancytopenia, prior Candida auris infection who presented initially with hypotension, fever, and tachycardia, and is now in the critical care unit for septic shock due to pneumonia.     Impression    # Septic shock due to pneumonia and UTI   # Type II NSTEMI  # Hx of COPD   # Hx of acute hypoxemic respiratory failure S/P tracheostomy   # Hx of staphylococcus bacteremia, candida auris   # Hx of PEG due to oropharyngeal dysphagia   # Hypothyroidism  # Chronic Hyponatremia   # Pancytopenia, anemia  # Glaucoma  # GERD  # Seizure disorder  # Hx of CVA   # Hyperlipidemia     Plan:      CNS:  Sedation: no sedation  Continue AEDs  Obtain OP Brivaracetam, if unable to obtain consult neuro for alternatives in the setting of allergies  Otherwise, neuro status at baseline    HEENT:  Oral care, trache care.    CARDIOVASCULAR  Vasopressors: levophed, wean as tolerated to maintain MAP > 65  Repeat IVF boluses as needed for worsening hypotension    PULMONARY  HOB @ 45 degrees, aspiration precautions  Target plateau pressure < 30, driving pressure <15  Decrease PEEP to 8, titrate FiO2 as tolerated to maintain spO2 > 92%  Long term ventilated, no role for SBT    GASTROINTESTINAL  GI prophylaxis: Pepcid BID  Feeds: TF at goal  BM: regimen PRN    GENITOURINARY/RENAL  Davis: for acute retention, TOV tomorrow  Monitor I&O  No acute renal injury, monitor UOP and bolus IVF as needed    INFECTIOUS  Follow-up blood & UCx  Continue empiric broad spectrum Abx: Cefepime & Levofloxacin  Discontinue Vanco, MRSA nares (-)  Repeat cultures PRN fever    HEMATOLOGIC  DVT ppx: lovenox SQ    ENDOCRINE  Follow up FS.  Insulin protocol as needed.  BG goal 140-180  Continue PTA levothyroxine    MSK/DERM  bedrest      CODE STATUS: FULL  DISPO: remain in MICU  LINES: R femoral TLC Opt out

## 2025-05-08 NOTE — PROGRESS NOTE ADULT - PROVIDER SPECIALTY LIST ADULT
Cost estimate requested.    Have you had a sleep study done before? y    Do you wear a PAP device at night? No     Are you on supplemental oxygen? No    Do you need any assistance walking? No     Returning day after Sleep Study at: 0900    /drop off at [facility]   Internal Medicine CCU
